# Patient Record
Sex: FEMALE | Race: WHITE | NOT HISPANIC OR LATINO | Employment: OTHER | URBAN - METROPOLITAN AREA
[De-identification: names, ages, dates, MRNs, and addresses within clinical notes are randomized per-mention and may not be internally consistent; named-entity substitution may affect disease eponyms.]

---

## 2017-12-21 ENCOUNTER — HOSPITAL ENCOUNTER (INPATIENT)
Facility: HOSPITAL | Age: 57
LOS: 5 days | Discharge: HOME | End: 2017-12-26
Attending: EMERGENCY MEDICINE | Admitting: INTERNAL MEDICINE

## 2017-12-21 ENCOUNTER — APPOINTMENT (OUTPATIENT)
Dept: CARDIOLOGY | Facility: HOSPITAL | Age: 57
End: 2017-12-21

## 2017-12-21 ENCOUNTER — APPOINTMENT (OUTPATIENT)
Dept: GENERAL RADIOLOGY | Facility: HOSPITAL | Age: 57
End: 2017-12-21

## 2017-12-21 DIAGNOSIS — J96.01 ACUTE RESPIRATORY FAILURE WITH HYPOXIA (HCC): ICD-10-CM

## 2017-12-21 DIAGNOSIS — I48.91 ATRIAL FIBRILLATION WITH RAPID VENTRICULAR RESPONSE (HCC): Primary | ICD-10-CM

## 2017-12-21 DIAGNOSIS — Z74.09 IMPAIRED FUNCTIONAL MOBILITY, BALANCE, GAIT, AND ENDURANCE: ICD-10-CM

## 2017-12-21 DIAGNOSIS — J42 CHRONIC BRONCHITIS, UNSPECIFIED CHRONIC BRONCHITIS TYPE (HCC): ICD-10-CM

## 2017-12-21 DIAGNOSIS — I50.32 CHRONIC DIASTOLIC CONGESTIVE HEART FAILURE (HCC): ICD-10-CM

## 2017-12-21 DIAGNOSIS — I50.9 ACUTE CONGESTIVE HEART FAILURE, UNSPECIFIED CONGESTIVE HEART FAILURE TYPE: ICD-10-CM

## 2017-12-21 PROBLEM — E11.9 DIABETES MELLITUS (HCC): Status: ACTIVE | Noted: 2017-12-21

## 2017-12-21 PROBLEM — G62.9 NEUROPATHY: Status: ACTIVE | Noted: 2017-12-21

## 2017-12-21 PROBLEM — Z72.0 TOBACCO ABUSE: Status: ACTIVE | Noted: 2017-12-21

## 2017-12-21 PROBLEM — I47.1 SUSTAINED SVT (HCC): Status: ACTIVE | Noted: 2017-12-21

## 2017-12-21 PROBLEM — J44.9 COPD (CHRONIC OBSTRUCTIVE PULMONARY DISEASE) (HCC): Status: ACTIVE | Noted: 2017-12-21

## 2017-12-21 PROBLEM — IMO0001 CLASS 3 OBESITY IN ADULT: Status: ACTIVE | Noted: 2017-12-21

## 2017-12-21 LAB
ALBUMIN SERPL-MCNC: 3.8 G/DL (ref 3.2–4.8)
ALBUMIN/GLOB SERPL: 1 G/DL (ref 1.5–2.5)
ALP SERPL-CCNC: 131 U/L (ref 25–100)
ALT SERPL W P-5'-P-CCNC: 21 U/L (ref 7–40)
ANION GAP SERPL CALCULATED.3IONS-SCNC: 18 MMOL/L (ref 3–11)
AST SERPL-CCNC: 26 U/L (ref 0–33)
BASOPHILS # BLD AUTO: 0.03 10*3/MM3 (ref 0–0.2)
BASOPHILS NFR BLD AUTO: 0.2 % (ref 0–1)
BH CV ECHO MEAS - AO MAX PG (FULL): 0.8 MMHG
BH CV ECHO MEAS - AO MAX PG: 4 MMHG
BH CV ECHO MEAS - AO ROOT AREA (BSA CORRECTED): 1.3
BH CV ECHO MEAS - AO ROOT AREA: 6.2 CM^2
BH CV ECHO MEAS - AO ROOT DIAM: 2.8 CM
BH CV ECHO MEAS - AO V2 MAX: 101 CM/SEC
BH CV ECHO MEAS - AVA(V,A): 2.8 CM^2
BH CV ECHO MEAS - AVA(V,D): 2.8 CM^2
BH CV ECHO MEAS - BSA(HAYCOCK): 2.3 M^2
BH CV ECHO MEAS - BSA: 2.1 M^2
BH CV ECHO MEAS - BZI_BMI: 44.3 KILOGRAMS/M^2
BH CV ECHO MEAS - BZI_METRIC_HEIGHT: 160 CM
BH CV ECHO MEAS - BZI_METRIC_WEIGHT: 113.4 KG
BH CV ECHO MEAS - EDV(CUBED): 94.8 ML
BH CV ECHO MEAS - EDV(TEICH): 95.4 ML
BH CV ECHO MEAS - EF(CUBED): 34.5 %
BH CV ECHO MEAS - EF(TEICH): 28.3 %
BH CV ECHO MEAS - ESV(CUBED): 62.1 ML
BH CV ECHO MEAS - ESV(TEICH): 68.3 ML
BH CV ECHO MEAS - FS: 13.2 %
BH CV ECHO MEAS - IVS/LVPW: 0.9
BH CV ECHO MEAS - IVSD: 0.96 CM
BH CV ECHO MEAS - LA DIMENSION: 4 CM
BH CV ECHO MEAS - LA/AO: 1.4
BH CV ECHO MEAS - LV MASS(C)D: 160.1 GRAMS
BH CV ECHO MEAS - LV MASS(C)DI: 75.3 GRAMS/M^2
BH CV ECHO MEAS - LV MAX PG: 3.2 MMHG
BH CV ECHO MEAS - LV MEAN PG: 1 MMHG
BH CV ECHO MEAS - LV V1 MAX: 89.5 CM/SEC
BH CV ECHO MEAS - LV V1 MEAN: 56 CM/SEC
BH CV ECHO MEAS - LV V1 VTI: 13.8 CM
BH CV ECHO MEAS - LVIDD: 4.6 CM
BH CV ECHO MEAS - LVIDS: 4 CM
BH CV ECHO MEAS - LVOT AREA (M): 3.1 CM^2
BH CV ECHO MEAS - LVOT AREA: 3.1 CM^2
BH CV ECHO MEAS - LVOT DIAM: 2 CM
BH CV ECHO MEAS - LVPWD: 1.1 CM
BH CV ECHO MEAS - MV DEC TIME: 0.18 SEC
BH CV ECHO MEAS - MV E MAX VEL: 110 CM/SEC
BH CV ECHO MEAS - RAP SYSTOLE: 8 MMHG
BH CV ECHO MEAS - RVDD: 3.5 CM
BH CV ECHO MEAS - RVSP: 31 MMHG
BH CV ECHO MEAS - SI(CUBED): 15.4 ML/M^2
BH CV ECHO MEAS - SI(LVOT): 20.4 ML/M^2
BH CV ECHO MEAS - SI(TEICH): 12.7 ML/M^2
BH CV ECHO MEAS - SV(CUBED): 32.7 ML
BH CV ECHO MEAS - SV(LVOT): 43.4 ML
BH CV ECHO MEAS - SV(TEICH): 27 ML
BH CV ECHO MEAS - TR MAX V: 23 MMHG
BH CV ECHO MEAS - TR MAX VEL: 236 CM/SEC
BH CV XLRA - RV BASE: 4.2 CM
BH CV XLRA - RV LENGTH: 8.5 CM
BH CV XLRA - RV MID: 3.8 CM
BILIRUB SERPL-MCNC: 0.5 MG/DL (ref 0.3–1.2)
BNP SERPL-MCNC: 342 PG/ML (ref 0–100)
BUN BLD-MCNC: 21 MG/DL (ref 9–23)
BUN/CREAT SERPL: 15 (ref 7–25)
CALCIUM SPEC-SCNC: 9.2 MG/DL (ref 8.7–10.4)
CHLORIDE SERPL-SCNC: 90 MMOL/L (ref 99–109)
CO2 SERPL-SCNC: 23 MMOL/L (ref 20–31)
CREAT BLD-MCNC: 1.4 MG/DL (ref 0.6–1.3)
DEPRECATED RDW RBC AUTO: 49.8 FL (ref 37–54)
EOSINOPHIL # BLD AUTO: 0.04 10*3/MM3 (ref 0–0.3)
EOSINOPHIL NFR BLD AUTO: 0.3 % (ref 0–3)
ERYTHROCYTE [DISTWIDTH] IN BLOOD BY AUTOMATED COUNT: 14.3 % (ref 11.3–14.5)
GFR SERPL CREATININE-BSD FRML MDRD: 39 ML/MIN/1.73
GLOBULIN UR ELPH-MCNC: 3.8 GM/DL
GLUCOSE BLD-MCNC: 173 MG/DL (ref 70–100)
GLUCOSE BLDC GLUCOMTR-MCNC: 106 MG/DL (ref 70–130)
GLUCOSE BLDC GLUCOMTR-MCNC: 133 MG/DL (ref 70–130)
HCT VFR BLD AUTO: 55.9 % (ref 34.5–44)
HGB BLD-MCNC: 18.2 G/DL (ref 11.5–15.5)
HOLD SPECIMEN: NORMAL
HOLD SPECIMEN: NORMAL
IMM GRANULOCYTES # BLD: 0.07 10*3/MM3 (ref 0–0.03)
IMM GRANULOCYTES NFR BLD: 0.5 % (ref 0–0.6)
LYMPHOCYTES # BLD AUTO: 1.9 10*3/MM3 (ref 0.6–4.8)
LYMPHOCYTES NFR BLD AUTO: 12.3 % (ref 24–44)
MAGNESIUM SERPL-MCNC: 1.8 MG/DL (ref 1.3–2.7)
MAXIMAL PREDICTED HEART RATE: 164 BPM
MCH RBC QN AUTO: 30.6 PG (ref 27–31)
MCHC RBC AUTO-ENTMCNC: 32.6 G/DL (ref 32–36)
MCV RBC AUTO: 94.1 FL (ref 80–99)
MONOCYTES # BLD AUTO: 0.92 10*3/MM3 (ref 0–1)
MONOCYTES NFR BLD AUTO: 5.9 % (ref 0–12)
NEUTROPHILS # BLD AUTO: 12.53 10*3/MM3 (ref 1.5–8.3)
NEUTROPHILS NFR BLD AUTO: 80.8 % (ref 41–71)
PLATELET # BLD AUTO: 301 10*3/MM3 (ref 150–450)
PMV BLD AUTO: 10.6 FL (ref 6–12)
POTASSIUM BLD-SCNC: 4.5 MMOL/L (ref 3.5–5.5)
PROT SERPL-MCNC: 7.6 G/DL (ref 5.7–8.2)
RBC # BLD AUTO: 5.94 10*6/MM3 (ref 3.89–5.14)
SODIUM BLD-SCNC: 131 MMOL/L (ref 132–146)
STRESS TARGET HR: 139 BPM
TROPONIN I SERPL-MCNC: 0 NG/ML (ref 0–0.07)
TROPONIN I SERPL-MCNC: 0.01 NG/ML
TSH SERPL DL<=0.05 MIU/L-ACNC: 3.65 MIU/ML (ref 0.35–5.35)
WBC NRBC COR # BLD: 15.49 10*3/MM3 (ref 3.5–10.8)
WHOLE BLOOD HOLD SPECIMEN: NORMAL
WHOLE BLOOD HOLD SPECIMEN: NORMAL

## 2017-12-21 PROCEDURE — 84443 ASSAY THYROID STIM HORMONE: CPT | Performed by: EMERGENCY MEDICINE

## 2017-12-21 PROCEDURE — 25010000002 ADENOSINE PER 6 MG

## 2017-12-21 PROCEDURE — 82962 GLUCOSE BLOOD TEST: CPT

## 2017-12-21 PROCEDURE — 80053 COMPREHEN METABOLIC PANEL: CPT | Performed by: EMERGENCY MEDICINE

## 2017-12-21 PROCEDURE — 99285 EMERGENCY DEPT VISIT HI MDM: CPT

## 2017-12-21 PROCEDURE — 83880 ASSAY OF NATRIURETIC PEPTIDE: CPT | Performed by: EMERGENCY MEDICINE

## 2017-12-21 PROCEDURE — 84484 ASSAY OF TROPONIN QUANT: CPT | Performed by: PHYSICIAN ASSISTANT

## 2017-12-21 PROCEDURE — 93306 TTE W/DOPPLER COMPLETE: CPT

## 2017-12-21 PROCEDURE — 25010000002 FUROSEMIDE PER 20 MG: Performed by: EMERGENCY MEDICINE

## 2017-12-21 PROCEDURE — G0378 HOSPITAL OBSERVATION PER HR: HCPCS

## 2017-12-21 PROCEDURE — 71010 HC CHEST PA OR AP: CPT

## 2017-12-21 PROCEDURE — 93306 TTE W/DOPPLER COMPLETE: CPT | Performed by: INTERNAL MEDICINE

## 2017-12-21 PROCEDURE — 83735 ASSAY OF MAGNESIUM: CPT | Performed by: EMERGENCY MEDICINE

## 2017-12-21 PROCEDURE — 93010 ELECTROCARDIOGRAM REPORT: CPT | Performed by: INTERNAL MEDICINE

## 2017-12-21 PROCEDURE — 63710000001 INSULIN LISPRO (HUMAN) PER 5 UNITS: Performed by: INTERNAL MEDICINE

## 2017-12-21 PROCEDURE — 99219 PR INITIAL OBSERVATION CARE/DAY 50 MINUTES: CPT | Performed by: INTERNAL MEDICINE

## 2017-12-21 PROCEDURE — 93005 ELECTROCARDIOGRAM TRACING: CPT | Performed by: EMERGENCY MEDICINE

## 2017-12-21 PROCEDURE — 25010000002 ADENOSINE PER 6 MG: Performed by: EMERGENCY MEDICINE

## 2017-12-21 PROCEDURE — 25010000002 SULFUR HEXAFLUORIDE MICROSPH 60.7-25 MG RECONSTITUTED SUSPENSION: Performed by: EMERGENCY MEDICINE

## 2017-12-21 PROCEDURE — 85025 COMPLETE CBC W/AUTO DIFF WBC: CPT | Performed by: EMERGENCY MEDICINE

## 2017-12-21 PROCEDURE — 84484 ASSAY OF TROPONIN QUANT: CPT

## 2017-12-21 PROCEDURE — 93005 ELECTROCARDIOGRAM TRACING: CPT | Performed by: PHYSICIAN ASSISTANT

## 2017-12-21 RX ORDER — METFORMIN HYDROCHLORIDE 500 MG/1
500 TABLET, EXTENDED RELEASE ORAL
Status: DISCONTINUED | OUTPATIENT
Start: 2017-12-22 | End: 2017-12-26 | Stop reason: HOSPADM

## 2017-12-21 RX ORDER — SENNOSIDES 8.6 MG
650 CAPSULE ORAL EVERY 8 HOURS PRN
COMMUNITY
End: 2018-03-14

## 2017-12-21 RX ORDER — FUROSEMIDE 10 MG/ML
40 INJECTION INTRAMUSCULAR; INTRAVENOUS ONCE
Status: COMPLETED | OUTPATIENT
Start: 2017-12-21 | End: 2017-12-21

## 2017-12-21 RX ORDER — CITALOPRAM 40 MG/1
20 TABLET ORAL DAILY
COMMUNITY
End: 2018-03-14

## 2017-12-21 RX ORDER — BISOPROLOL FUMARATE 10 MG/1
10 TABLET, FILM COATED ORAL DAILY
COMMUNITY
End: 2017-12-26 | Stop reason: HOSPADM

## 2017-12-21 RX ORDER — ACETAMINOPHEN 325 MG/1
650 TABLET ORAL EVERY 8 HOURS PRN
Status: DISCONTINUED | OUTPATIENT
Start: 2017-12-21 | End: 2017-12-26 | Stop reason: HOSPADM

## 2017-12-21 RX ORDER — FUROSEMIDE 40 MG/1
80 TABLET ORAL ONCE
Status: COMPLETED | OUTPATIENT
Start: 2017-12-21 | End: 2017-12-21

## 2017-12-21 RX ORDER — HYDROXYZINE 50 MG/1
50 TABLET, FILM COATED ORAL EVERY 6 HOURS PRN
COMMUNITY
End: 2017-12-21

## 2017-12-21 RX ORDER — ASPIRIN 81 MG/1
81 TABLET ORAL DAILY
Status: DISCONTINUED | OUTPATIENT
Start: 2017-12-21 | End: 2017-12-26 | Stop reason: HOSPADM

## 2017-12-21 RX ORDER — METFORMIN HYDROCHLORIDE 500 MG/1
500 TABLET, EXTENDED RELEASE ORAL EVERY EVENING
Status: DISCONTINUED | OUTPATIENT
Start: 2017-12-21 | End: 2017-12-21

## 2017-12-21 RX ORDER — CITALOPRAM 40 MG/1
40 TABLET ORAL DAILY
Status: DISCONTINUED | OUTPATIENT
Start: 2017-12-21 | End: 2017-12-26 | Stop reason: HOSPADM

## 2017-12-21 RX ORDER — NICOTINE POLACRILEX 4 MG
15 LOZENGE BUCCAL
Status: DISCONTINUED | OUTPATIENT
Start: 2017-12-21 | End: 2017-12-26 | Stop reason: HOSPADM

## 2017-12-21 RX ORDER — ASPIRIN 81 MG/1
81 TABLET ORAL DAILY
COMMUNITY
End: 2017-12-21

## 2017-12-21 RX ORDER — SODIUM CHLORIDE 0.9 % (FLUSH) 0.9 %
10 SYRINGE (ML) INJECTION AS NEEDED
Status: DISCONTINUED | OUTPATIENT
Start: 2017-12-21 | End: 2017-12-26 | Stop reason: HOSPADM

## 2017-12-21 RX ORDER — ADENOSINE 3 MG/ML
6 INJECTION, SOLUTION INTRAVENOUS ONCE
Status: COMPLETED | OUTPATIENT
Start: 2017-12-21 | End: 2017-12-21

## 2017-12-21 RX ORDER — METFORMIN HYDROCHLORIDE 500 MG/1
500 TABLET, EXTENDED RELEASE ORAL EVERY EVENING
Status: ON HOLD | COMMUNITY
End: 2017-12-26

## 2017-12-21 RX ORDER — POTASSIUM CHLORIDE 1.5 G/1.77G
20 POWDER, FOR SOLUTION ORAL DAILY
Status: DISCONTINUED | OUTPATIENT
Start: 2017-12-21 | End: 2017-12-26

## 2017-12-21 RX ORDER — CETIRIZINE HYDROCHLORIDE 10 MG/1
10 TABLET ORAL AS NEEDED
COMMUNITY

## 2017-12-21 RX ORDER — ALBUTEROL SULFATE 90 UG/1
2 AEROSOL, METERED RESPIRATORY (INHALATION) EVERY 4 HOURS PRN
COMMUNITY
End: 2017-12-26 | Stop reason: HOSPADM

## 2017-12-21 RX ORDER — CLOPIDOGREL BISULFATE 75 MG/1
75 TABLET ORAL DAILY
COMMUNITY
End: 2017-12-26 | Stop reason: HOSPADM

## 2017-12-21 RX ORDER — DILTIAZEM HCL IN NACL,ISO-OSM 125 MG/125
5-15 PLASTIC BAG, INJECTION (ML) INTRAVENOUS CONTINUOUS
Status: DISCONTINUED | OUTPATIENT
Start: 2017-12-21 | End: 2017-12-22

## 2017-12-21 RX ORDER — DEXTROSE MONOHYDRATE 25 G/50ML
25 INJECTION, SOLUTION INTRAVENOUS
Status: DISCONTINUED | OUTPATIENT
Start: 2017-12-21 | End: 2017-12-26 | Stop reason: HOSPADM

## 2017-12-21 RX ORDER — PANTOPRAZOLE SODIUM 40 MG/1
40 TABLET, DELAYED RELEASE ORAL EVERY MORNING
Status: DISCONTINUED | OUTPATIENT
Start: 2017-12-22 | End: 2017-12-26 | Stop reason: HOSPADM

## 2017-12-21 RX ORDER — FUROSEMIDE 80 MG
80 TABLET ORAL 2 TIMES DAILY
COMMUNITY
End: 2017-12-26 | Stop reason: HOSPADM

## 2017-12-21 RX ORDER — CETIRIZINE HYDROCHLORIDE 10 MG/1
10 TABLET ORAL DAILY
Status: DISCONTINUED | OUTPATIENT
Start: 2017-12-21 | End: 2017-12-26 | Stop reason: HOSPADM

## 2017-12-21 RX ORDER — BISOPROLOL FUMARATE 5 MG/1
10 TABLET, FILM COATED ORAL
Status: DISCONTINUED | OUTPATIENT
Start: 2017-12-21 | End: 2017-12-22

## 2017-12-21 RX ORDER — HEPARIN SODIUM 5000 [USP'U]/ML
5000 INJECTION, SOLUTION INTRAVENOUS; SUBCUTANEOUS EVERY 12 HOURS SCHEDULED
Status: DISCONTINUED | OUTPATIENT
Start: 2017-12-21 | End: 2017-12-21

## 2017-12-21 RX ORDER — POTASSIUM CHLORIDE 750 MG/1
20 TABLET, EXTENDED RELEASE ORAL DAILY
COMMUNITY
End: 2018-03-14

## 2017-12-21 RX ORDER — ASPIRIN 325 MG
325 TABLET ORAL DAILY
COMMUNITY
End: 2017-12-26 | Stop reason: HOSPADM

## 2017-12-21 RX ORDER — CLOPIDOGREL BISULFATE 75 MG/1
75 TABLET ORAL ONCE
Status: COMPLETED | OUTPATIENT
Start: 2017-12-21 | End: 2017-12-21

## 2017-12-21 RX ORDER — ADENOSINE 3 MG/ML
INJECTION, SOLUTION INTRAVENOUS
Status: COMPLETED
Start: 2017-12-21 | End: 2017-12-21

## 2017-12-21 RX ORDER — OMEPRAZOLE 40 MG/1
40 CAPSULE, DELAYED RELEASE ORAL DAILY
COMMUNITY

## 2017-12-21 RX ORDER — FUROSEMIDE 40 MG/1
80 TABLET ORAL
Status: DISCONTINUED | OUTPATIENT
Start: 2017-12-21 | End: 2017-12-25

## 2017-12-21 RX ORDER — HYDROXYZINE HYDROCHLORIDE 25 MG/1
50 TABLET, FILM COATED ORAL 3 TIMES DAILY PRN
Status: DISCONTINUED | OUTPATIENT
Start: 2017-12-21 | End: 2017-12-26 | Stop reason: HOSPADM

## 2017-12-21 RX ORDER — CLOPIDOGREL BISULFATE 75 MG/1
75 TABLET ORAL DAILY
Status: DISCONTINUED | OUTPATIENT
Start: 2017-12-22 | End: 2017-12-26 | Stop reason: HOSPADM

## 2017-12-21 RX ORDER — SODIUM CHLORIDE 0.9 % (FLUSH) 0.9 %
1-10 SYRINGE (ML) INJECTION AS NEEDED
Status: DISCONTINUED | OUTPATIENT
Start: 2017-12-21 | End: 2017-12-26 | Stop reason: HOSPADM

## 2017-12-21 RX ORDER — HYDROXYZINE PAMOATE 50 MG/1
50 CAPSULE ORAL EVERY MORNING
COMMUNITY

## 2017-12-21 RX ORDER — ADENOSINE 3 MG/ML
12 INJECTION, SOLUTION INTRAVENOUS ONCE
Status: COMPLETED | OUTPATIENT
Start: 2017-12-21 | End: 2017-12-21

## 2017-12-21 RX ADMIN — FUROSEMIDE 40 MG: 10 INJECTION, SOLUTION INTRAMUSCULAR; INTRAVENOUS at 09:08

## 2017-12-21 RX ADMIN — BISOPROLOL FUMARATE 10 MG: 5 TABLET ORAL at 12:27

## 2017-12-21 RX ADMIN — SULFUR HEXAFLUORIDE 2 ML: KIT at 14:15

## 2017-12-21 RX ADMIN — CLOPIDOGREL BISULFATE 75 MG: 75 TABLET ORAL at 12:27

## 2017-12-21 RX ADMIN — ADENOSINE 6 MG: 3 INJECTION, SOLUTION INTRAVENOUS at 06:35

## 2017-12-21 RX ADMIN — DILTIAZEM HCL-SODIUM CHLORIDE IV SOLN 125 MG/125ML-0.9% 5 MG/HR: 125-0.9/125 SOLUTION at 07:22

## 2017-12-21 RX ADMIN — ADENOSINE 6 MG: 3 INJECTION INTRAVENOUS at 06:35

## 2017-12-21 RX ADMIN — RIVAROXABAN 20 MG: 20 TABLET, FILM COATED ORAL at 17:32

## 2017-12-21 RX ADMIN — ADENOSINE 12 MG: 3 INJECTION INTRAVENOUS at 06:45

## 2017-12-21 RX ADMIN — FUROSEMIDE 80 MG: 40 TABLET ORAL at 17:32

## 2017-12-21 RX ADMIN — ASPIRIN 81 MG: 81 TABLET, COATED ORAL at 17:32

## 2017-12-21 RX ADMIN — CITALOPRAM HYDROBROMIDE 40 MG: 40 TABLET ORAL at 17:32

## 2017-12-21 RX ADMIN — SODIUM CHLORIDE 1000 ML: 9 INJECTION, SOLUTION INTRAVENOUS at 07:21

## 2017-12-21 RX ADMIN — POTASSIUM CHLORIDE 20 MEQ: 1.5 POWDER, FOR SOLUTION ORAL at 17:32

## 2017-12-21 RX ADMIN — CETIRIZINE HYDROCHLORIDE 10 MG: 10 TABLET, FILM COATED ORAL at 17:32

## 2017-12-21 RX ADMIN — FUROSEMIDE 80 MG: 40 TABLET ORAL at 12:27

## 2017-12-22 ENCOUNTER — ANESTHESIA (OUTPATIENT)
Dept: CARDIOLOGY | Facility: HOSPITAL | Age: 57
End: 2017-12-22

## 2017-12-22 ENCOUNTER — ANESTHESIA EVENT (OUTPATIENT)
Dept: CARDIOLOGY | Facility: HOSPITAL | Age: 57
End: 2017-12-22

## 2017-12-22 ENCOUNTER — APPOINTMENT (OUTPATIENT)
Dept: CARDIOLOGY | Facility: HOSPITAL | Age: 57
End: 2017-12-22
Attending: INTERNAL MEDICINE

## 2017-12-22 LAB
GLUCOSE BLDC GLUCOMTR-MCNC: 124 MG/DL (ref 70–130)
GLUCOSE BLDC GLUCOMTR-MCNC: 194 MG/DL (ref 70–130)
GLUCOSE BLDC GLUCOMTR-MCNC: 96 MG/DL (ref 70–130)
HBA1C MFR BLD: 7.5 % (ref 4.8–5.6)
TROPONIN I SERPL-MCNC: <0.006 NG/ML

## 2017-12-22 PROCEDURE — 93325 DOPPLER ECHO COLOR FLOW MAPG: CPT | Performed by: INTERNAL MEDICINE

## 2017-12-22 PROCEDURE — 5A2204Z RESTORATION OF CARDIAC RHYTHM, SINGLE: ICD-10-PCS | Performed by: INTERNAL MEDICINE

## 2017-12-22 PROCEDURE — 84484 ASSAY OF TROPONIN QUANT: CPT | Performed by: INTERNAL MEDICINE

## 2017-12-22 PROCEDURE — B24BZZ4 ULTRASONOGRAPHY OF HEART WITH AORTA, TRANSESOPHAGEAL: ICD-10-PCS | Performed by: INTERNAL MEDICINE

## 2017-12-22 PROCEDURE — 92960 CARDIOVERSION ELECTRIC EXT: CPT | Performed by: INTERNAL MEDICINE

## 2017-12-22 PROCEDURE — 93005 ELECTROCARDIOGRAM TRACING: CPT | Performed by: INTERNAL MEDICINE

## 2017-12-22 PROCEDURE — G0378 HOSPITAL OBSERVATION PER HR: HCPCS

## 2017-12-22 PROCEDURE — 94799 UNLISTED PULMONARY SVC/PX: CPT

## 2017-12-22 PROCEDURE — 93005 ELECTROCARDIOGRAM TRACING: CPT | Performed by: ANESTHESIOLOGY

## 2017-12-22 PROCEDURE — 93321 DOPPLER ECHO F-UP/LMTD STD: CPT

## 2017-12-22 PROCEDURE — 25010000002 PROPOFOL 1000 MG/ML EMULSION: Performed by: NURSE ANESTHETIST, CERTIFIED REGISTERED

## 2017-12-22 PROCEDURE — 82962 GLUCOSE BLOOD TEST: CPT

## 2017-12-22 PROCEDURE — 94002 VENT MGMT INPAT INIT DAY: CPT

## 2017-12-22 PROCEDURE — 93312 ECHO TRANSESOPHAGEAL: CPT | Performed by: INTERNAL MEDICINE

## 2017-12-22 PROCEDURE — 25010000002 SUCCINYLCHOLINE PER 20 MG: Performed by: NURSE ANESTHETIST, CERTIFIED REGISTERED

## 2017-12-22 PROCEDURE — 93312 ECHO TRANSESOPHAGEAL: CPT

## 2017-12-22 PROCEDURE — 93321 DOPPLER ECHO F-UP/LMTD STD: CPT | Performed by: INTERNAL MEDICINE

## 2017-12-22 PROCEDURE — 83036 HEMOGLOBIN GLYCOSYLATED A1C: CPT | Performed by: PHYSICIAN ASSISTANT

## 2017-12-22 PROCEDURE — 93325 DOPPLER ECHO COLOR FLOW MAPG: CPT

## 2017-12-22 PROCEDURE — 92960 CARDIOVERSION ELECTRIC EXT: CPT

## 2017-12-22 RX ORDER — SOTALOL HYDROCHLORIDE 80 MG/1
80 TABLET ORAL ONCE
Status: COMPLETED | OUTPATIENT
Start: 2017-12-22 | End: 2017-12-22

## 2017-12-22 RX ORDER — LIDOCAINE HYDROCHLORIDE 10 MG/ML
INJECTION, SOLUTION EPIDURAL; INFILTRATION; INTRACAUDAL; PERINEURAL AS NEEDED
Status: DISCONTINUED | OUTPATIENT
Start: 2017-12-22 | End: 2017-12-22 | Stop reason: SURG

## 2017-12-22 RX ORDER — ROCURONIUM BROMIDE 10 MG/ML
INJECTION, SOLUTION INTRAVENOUS AS NEEDED
Status: DISCONTINUED | OUTPATIENT
Start: 2017-12-22 | End: 2017-12-22 | Stop reason: SURG

## 2017-12-22 RX ORDER — LABETALOL HYDROCHLORIDE 5 MG/ML
5 INJECTION, SOLUTION INTRAVENOUS
Status: DISCONTINUED | OUTPATIENT
Start: 2017-12-22 | End: 2017-12-22

## 2017-12-22 RX ORDER — FAMOTIDINE 10 MG/ML
20 INJECTION, SOLUTION INTRAVENOUS ONCE
Status: DISCONTINUED | OUTPATIENT
Start: 2017-12-22 | End: 2017-12-22

## 2017-12-22 RX ORDER — FUROSEMIDE 40 MG/1
80 TABLET ORAL ONCE
Status: COMPLETED | OUTPATIENT
Start: 2017-12-22 | End: 2017-12-22

## 2017-12-22 RX ORDER — FAMOTIDINE 20 MG/1
20 TABLET, FILM COATED ORAL ONCE
Status: DISCONTINUED | OUTPATIENT
Start: 2017-12-22 | End: 2017-12-22

## 2017-12-22 RX ORDER — MEPERIDINE HYDROCHLORIDE 25 MG/ML
12.5 INJECTION INTRAMUSCULAR; INTRAVENOUS; SUBCUTANEOUS
Status: DISCONTINUED | OUTPATIENT
Start: 2017-12-22 | End: 2017-12-22

## 2017-12-22 RX ORDER — SODIUM CHLORIDE, SODIUM LACTATE, POTASSIUM CHLORIDE, CALCIUM CHLORIDE 600; 310; 30; 20 MG/100ML; MG/100ML; MG/100ML; MG/100ML
9 INJECTION, SOLUTION INTRAVENOUS CONTINUOUS
Status: DISCONTINUED | OUTPATIENT
Start: 2017-12-22 | End: 2017-12-22

## 2017-12-22 RX ORDER — ONDANSETRON 2 MG/ML
4 INJECTION INTRAMUSCULAR; INTRAVENOUS ONCE AS NEEDED
Status: DISCONTINUED | OUTPATIENT
Start: 2017-12-22 | End: 2017-12-22

## 2017-12-22 RX ORDER — SODIUM CHLORIDE 0.9 % (FLUSH) 0.9 %
1-10 SYRINGE (ML) INJECTION AS NEEDED
Status: DISCONTINUED | OUTPATIENT
Start: 2017-12-22 | End: 2017-12-22

## 2017-12-22 RX ORDER — ETOMIDATE 2 MG/ML
INJECTION INTRAVENOUS AS NEEDED
Status: DISCONTINUED | OUTPATIENT
Start: 2017-12-22 | End: 2017-12-22 | Stop reason: SURG

## 2017-12-22 RX ORDER — EPHEDRINE SULFATE 50 MG/ML
5 INJECTION, SOLUTION INTRAVENOUS ONCE AS NEEDED
Status: DISCONTINUED | OUTPATIENT
Start: 2017-12-22 | End: 2017-12-22

## 2017-12-22 RX ORDER — SOTALOL HYDROCHLORIDE 80 MG/1
80 TABLET ORAL EVERY 12 HOURS SCHEDULED
Status: DISCONTINUED | OUTPATIENT
Start: 2017-12-22 | End: 2017-12-23

## 2017-12-22 RX ORDER — SODIUM CHLORIDE 9 MG/ML
INJECTION, SOLUTION INTRAVENOUS CONTINUOUS PRN
Status: DISCONTINUED | OUTPATIENT
Start: 2017-12-22 | End: 2017-12-22 | Stop reason: SURG

## 2017-12-22 RX ORDER — LIDOCAINE HYDROCHLORIDE 10 MG/ML
0.5 INJECTION, SOLUTION EPIDURAL; INFILTRATION; INTRACAUDAL; PERINEURAL ONCE AS NEEDED
Status: DISCONTINUED | OUTPATIENT
Start: 2017-12-22 | End: 2017-12-22

## 2017-12-22 RX ORDER — SUCCINYLCHOLINE CHLORIDE 20 MG/ML
INJECTION INTRAMUSCULAR; INTRAVENOUS AS NEEDED
Status: DISCONTINUED | OUTPATIENT
Start: 2017-12-22 | End: 2017-12-22 | Stop reason: SURG

## 2017-12-22 RX ORDER — SODIUM CHLORIDE 9 MG/ML
50 INJECTION, SOLUTION INTRAVENOUS CONTINUOUS
Status: DISCONTINUED | OUTPATIENT
Start: 2017-12-23 | End: 2017-12-26 | Stop reason: HOSPADM

## 2017-12-22 RX ORDER — SODIUM CHLORIDE, SODIUM LACTATE, POTASSIUM CHLORIDE, CALCIUM CHLORIDE 600; 310; 30; 20 MG/100ML; MG/100ML; MG/100ML; MG/100ML
100 INJECTION, SOLUTION INTRAVENOUS CONTINUOUS
Status: DISCONTINUED | OUTPATIENT
Start: 2017-12-22 | End: 2017-12-22

## 2017-12-22 RX ORDER — NALOXONE HCL 0.4 MG/ML
0.4 VIAL (ML) INJECTION AS NEEDED
Status: DISCONTINUED | OUTPATIENT
Start: 2017-12-22 | End: 2017-12-22

## 2017-12-22 RX ADMIN — SODIUM CHLORIDE: 9 INJECTION, SOLUTION INTRAVENOUS at 15:16

## 2017-12-22 RX ADMIN — RIVAROXABAN 20 MG: 20 TABLET, FILM COATED ORAL at 18:32

## 2017-12-22 RX ADMIN — SODIUM CHLORIDE: 9 INJECTION, SOLUTION INTRAVENOUS at 15:17

## 2017-12-22 RX ADMIN — FUROSEMIDE 80 MG: 40 TABLET ORAL at 18:33

## 2017-12-22 RX ADMIN — LIDOCAINE HYDROCHLORIDE 30 MG: 10 INJECTION, SOLUTION EPIDURAL; INFILTRATION; INTRACAUDAL; PERINEURAL at 15:17

## 2017-12-22 RX ADMIN — ACETAMINOPHEN 650 MG: 325 TABLET ORAL at 20:02

## 2017-12-22 RX ADMIN — SUCCINYLCHOLINE CHLORIDE 180 MG: 20 INJECTION, SOLUTION INTRAMUSCULAR; INTRAVENOUS at 15:17

## 2017-12-22 RX ADMIN — SOTALOL HYDROCHLORIDE 80 MG: 80 TABLET ORAL at 18:32

## 2017-12-22 RX ADMIN — PROPOFOL 125 MCG/KG/MIN: 10 INJECTION, EMULSION INTRAVENOUS at 15:19

## 2017-12-22 RX ADMIN — INSULIN LISPRO 2 UNITS: 100 INJECTION, SOLUTION INTRAVENOUS; SUBCUTANEOUS at 22:00

## 2017-12-22 RX ADMIN — ROCURONIUM BROMIDE 5 MG: 10 INJECTION, SOLUTION INTRAVENOUS at 15:17

## 2017-12-22 RX ADMIN — ETOMIDATE 22.68 MG: 2 INJECTION INTRAVENOUS at 15:17

## 2017-12-22 NOTE — ANESTHESIA PREPROCEDURE EVALUATION
Anesthesia Evaluation     Patient summary reviewed and Nursing notes reviewed   NPO Solid Status: > 8 hours  NPO Liquid Status: > 8 hours     Airway   Mallampati: I  TM distance: >3 FB  Neck ROM: full  no difficulty expected  Dental    (+) edentulous    Pulmonary    (+) decreased breath sounds,   Cardiovascular     Rhythm: regular  Rate: abnormal        Neuro/Psych  GI/Hepatic/Renal/Endo      Musculoskeletal     Abdominal    Substance History      OB/GYN          Other                                        Anesthesia Plan    ASA 3     MAC     intravenous induction   Anesthetic plan and risks discussed with patient.

## 2017-12-22 NOTE — ANESTHESIA PROCEDURE NOTES
Airway  Urgency: elective    Airway not difficult    General Information and Staff    Patient location during procedure: OR  Anesthesiologist: CHARLINE HERNANDEZ  CRNA: ELISEO BATISTA    Indications and Patient Condition  Indications for airway management: airway protection    Preoxygenated: yes  MILS not maintained throughout  Mask difficulty assessment: 1 - vent by mask    Final Airway Details  Final airway type: endotracheal airway      Successful airway: ETT  Cuffed: yes   Successful intubation technique: direct laryngoscopy  Endotracheal tube insertion site: oral  Blade: Singleton  Blade size: #2  ETT size: 7.0 mm  Cormack-Lehane Classification: grade I - full view of glottis  Placement verified by: chest auscultation and capnometry   Cuff volume (mL): 5  Measured from: lips  ETT to lips (cm): 20  Number of attempts at approach: 1    Additional Comments  Negative epigastric sounds, Breath sound equal bilaterally with symmetric chest rise and fall

## 2017-12-22 NOTE — ANESTHESIA POSTPROCEDURE EVALUATION
Patient: Maria T Garcia    Procedure Summary     Date Anesthesia Start Anesthesia Stop Room / Location    12/22/17 4117  [facility] CVOU       Procedure Diagnosis Scheduled Providers Provider    ADULT TRANSESOPHAGEAL ECHO (MARY) W/ CONT IF NECESSARY PER PROTOCOL; CARDIOVERSION EXTERNAL IN CARDIOLOGY DEPARTMENT (Cardiac Source of Emboli; Atypical atrial flutter)  Coy Oglesby MD          Anesthesia Type: MAC  Last vitals  BP   96/69 (12/22/17 1233)   Temp   97.9 °F (36.6 °C) (12/22/17 0700)   Pulse   103 (12/22/17 1233)   Resp   20 (12/22/17 0700)     SpO2   91 % (12/22/17 1233)     Post Anesthesia Care and Evaluation    Patient location during evaluation: PACU  Patient participation: complete - patient participated  Pain score: 0  Pain management: adequate  Airway patency: patent  Anesthetic complications: No anesthetic complications  PONV Status: none  Cardiovascular status: hemodynamically stable and acceptable  Respiratory status: acceptable, ETT and intubated  Hydration status: acceptable

## 2017-12-23 LAB
ANION GAP SERPL CALCULATED.3IONS-SCNC: 6 MMOL/L (ref 3–11)
BUN BLD-MCNC: 17 MG/DL (ref 9–23)
BUN/CREAT SERPL: 14.2 (ref 7–25)
CALCIUM SPEC-SCNC: 8.3 MG/DL (ref 8.7–10.4)
CHLORIDE SERPL-SCNC: 98 MMOL/L (ref 99–109)
CO2 SERPL-SCNC: 30 MMOL/L (ref 20–31)
CREAT BLD-MCNC: 1.2 MG/DL (ref 0.6–1.3)
GFR SERPL CREATININE-BSD FRML MDRD: 46 ML/MIN/1.73
GLUCOSE BLD-MCNC: 122 MG/DL (ref 70–100)
GLUCOSE BLDC GLUCOMTR-MCNC: 105 MG/DL (ref 70–130)
GLUCOSE BLDC GLUCOMTR-MCNC: 126 MG/DL (ref 70–130)
GLUCOSE BLDC GLUCOMTR-MCNC: 142 MG/DL (ref 70–130)
GLUCOSE BLDC GLUCOMTR-MCNC: 158 MG/DL (ref 70–130)
POTASSIUM BLD-SCNC: 4.1 MMOL/L (ref 3.5–5.5)
SODIUM BLD-SCNC: 134 MMOL/L (ref 132–146)

## 2017-12-23 PROCEDURE — 82962 GLUCOSE BLOOD TEST: CPT

## 2017-12-23 PROCEDURE — G0378 HOSPITAL OBSERVATION PER HR: HCPCS

## 2017-12-23 PROCEDURE — 80048 BASIC METABOLIC PNL TOTAL CA: CPT | Performed by: NURSE PRACTITIONER

## 2017-12-23 PROCEDURE — 93005 ELECTROCARDIOGRAM TRACING: CPT | Performed by: PHYSICIAN ASSISTANT

## 2017-12-23 PROCEDURE — 25010000002 FUROSEMIDE PER 20 MG: Performed by: NURSE PRACTITIONER

## 2017-12-23 PROCEDURE — 99225 PR SBSQ OBSERVATION CARE/DAY 25 MINUTES: CPT | Performed by: INTERNAL MEDICINE

## 2017-12-23 RX ORDER — BUMETANIDE 0.25 MG/ML
1 INJECTION INTRAMUSCULAR; INTRAVENOUS ONCE
Status: DISCONTINUED | OUTPATIENT
Start: 2017-12-23 | End: 2017-12-23 | Stop reason: CLARIF

## 2017-12-23 RX ORDER — FUROSEMIDE 10 MG/ML
40 INJECTION INTRAMUSCULAR; INTRAVENOUS ONCE
Status: COMPLETED | OUTPATIENT
Start: 2017-12-23 | End: 2017-12-23

## 2017-12-23 RX ORDER — SOTALOL HYDROCHLORIDE 80 MG/1
40 TABLET ORAL EVERY 12 HOURS SCHEDULED
Status: DISCONTINUED | OUTPATIENT
Start: 2017-12-23 | End: 2017-12-26 | Stop reason: HOSPADM

## 2017-12-23 RX ADMIN — CLOPIDOGREL BISULFATE 75 MG: 75 TABLET ORAL at 09:36

## 2017-12-23 RX ADMIN — PANTOPRAZOLE SODIUM 40 MG: 40 TABLET, DELAYED RELEASE ORAL at 06:46

## 2017-12-23 RX ADMIN — FUROSEMIDE 80 MG: 40 TABLET ORAL at 09:43

## 2017-12-23 RX ADMIN — ASPIRIN 81 MG: 81 TABLET, COATED ORAL at 09:35

## 2017-12-23 RX ADMIN — SODIUM CHLORIDE 50 ML/HR: 9 INJECTION, SOLUTION INTRAVENOUS at 23:27

## 2017-12-23 RX ADMIN — FUROSEMIDE 20 MG: 10 INJECTION, SOLUTION INTRAMUSCULAR; INTRAVENOUS at 09:36

## 2017-12-23 RX ADMIN — CETIRIZINE HYDROCHLORIDE 10 MG: 10 TABLET, FILM COATED ORAL at 09:35

## 2017-12-23 RX ADMIN — METFORMIN HYDROCHLORIDE 500 MG: 500 TABLET, EXTENDED RELEASE ORAL at 09:36

## 2017-12-23 RX ADMIN — POTASSIUM CHLORIDE 20 MEQ: 1.5 POWDER, FOR SOLUTION ORAL at 09:34

## 2017-12-23 RX ADMIN — CITALOPRAM HYDROBROMIDE 40 MG: 40 TABLET ORAL at 09:35

## 2017-12-24 LAB
ANION GAP SERPL CALCULATED.3IONS-SCNC: 6 MMOL/L (ref 3–11)
BUN BLD-MCNC: 15 MG/DL (ref 9–23)
BUN/CREAT SERPL: 13.6 (ref 7–25)
CALCIUM SPEC-SCNC: 8.9 MG/DL (ref 8.7–10.4)
CHLORIDE SERPL-SCNC: 97 MMOL/L (ref 99–109)
CO2 SERPL-SCNC: 33 MMOL/L (ref 20–31)
CREAT BLD-MCNC: 1.1 MG/DL (ref 0.6–1.3)
GFR SERPL CREATININE-BSD FRML MDRD: 51 ML/MIN/1.73
GLUCOSE BLD-MCNC: 103 MG/DL (ref 70–100)
GLUCOSE BLDC GLUCOMTR-MCNC: 103 MG/DL (ref 70–130)
GLUCOSE BLDC GLUCOMTR-MCNC: 113 MG/DL (ref 70–130)
GLUCOSE BLDC GLUCOMTR-MCNC: 135 MG/DL (ref 70–130)
GLUCOSE BLDC GLUCOMTR-MCNC: 142 MG/DL (ref 70–130)
POTASSIUM BLD-SCNC: 4 MMOL/L (ref 3.5–5.5)
SODIUM BLD-SCNC: 136 MMOL/L (ref 132–146)

## 2017-12-24 PROCEDURE — 93005 ELECTROCARDIOGRAM TRACING: CPT | Performed by: NURSE PRACTITIONER

## 2017-12-24 PROCEDURE — G0378 HOSPITAL OBSERVATION PER HR: HCPCS

## 2017-12-24 PROCEDURE — 93010 ELECTROCARDIOGRAM REPORT: CPT | Performed by: INTERNAL MEDICINE

## 2017-12-24 PROCEDURE — 97116 GAIT TRAINING THERAPY: CPT | Performed by: PHYSICAL THERAPIST

## 2017-12-24 PROCEDURE — 82962 GLUCOSE BLOOD TEST: CPT

## 2017-12-24 PROCEDURE — G8979 MOBILITY GOAL STATUS: HCPCS | Performed by: PHYSICAL THERAPIST

## 2017-12-24 PROCEDURE — 97162 PT EVAL MOD COMPLEX 30 MIN: CPT | Performed by: PHYSICAL THERAPIST

## 2017-12-24 PROCEDURE — G8978 MOBILITY CURRENT STATUS: HCPCS | Performed by: PHYSICAL THERAPIST

## 2017-12-24 PROCEDURE — 80048 BASIC METABOLIC PNL TOTAL CA: CPT | Performed by: NURSE PRACTITIONER

## 2017-12-24 PROCEDURE — 99225 PR SBSQ OBSERVATION CARE/DAY 25 MINUTES: CPT | Performed by: INTERNAL MEDICINE

## 2017-12-24 RX ORDER — BUMETANIDE 1 MG/1
1 TABLET ORAL ONCE
Status: COMPLETED | OUTPATIENT
Start: 2017-12-24 | End: 2017-12-24

## 2017-12-24 RX ORDER — BUMETANIDE 0.25 MG/ML
1 INJECTION INTRAMUSCULAR; INTRAVENOUS ONCE
Status: DISCONTINUED | OUTPATIENT
Start: 2017-12-24 | End: 2017-12-24 | Stop reason: SDUPTHER

## 2017-12-24 RX ADMIN — SOTALOL HYDROCHLORIDE 40 MG: 80 TABLET ORAL at 06:34

## 2017-12-24 RX ADMIN — FUROSEMIDE 80 MG: 40 TABLET ORAL at 08:55

## 2017-12-24 RX ADMIN — BUMETANIDE 1 MG: 1 TABLET ORAL at 08:54

## 2017-12-24 RX ADMIN — ASPIRIN 81 MG: 81 TABLET, COATED ORAL at 08:55

## 2017-12-24 RX ADMIN — CITALOPRAM HYDROBROMIDE 40 MG: 40 TABLET ORAL at 08:55

## 2017-12-24 RX ADMIN — CETIRIZINE HYDROCHLORIDE 10 MG: 10 TABLET, FILM COATED ORAL at 08:55

## 2017-12-24 RX ADMIN — PANTOPRAZOLE SODIUM 40 MG: 40 TABLET, DELAYED RELEASE ORAL at 06:33

## 2017-12-24 RX ADMIN — POTASSIUM CHLORIDE 20 MEQ: 1.5 POWDER, FOR SOLUTION ORAL at 08:55

## 2017-12-24 RX ADMIN — METFORMIN HYDROCHLORIDE 500 MG: 500 TABLET, EXTENDED RELEASE ORAL at 08:55

## 2017-12-24 RX ADMIN — CLOPIDOGREL BISULFATE 75 MG: 75 TABLET ORAL at 08:55

## 2017-12-24 RX ADMIN — FUROSEMIDE 80 MG: 40 TABLET ORAL at 17:51

## 2017-12-24 RX ADMIN — SOTALOL HYDROCHLORIDE 40 MG: 80 TABLET ORAL at 17:51

## 2017-12-25 LAB
ANION GAP SERPL CALCULATED.3IONS-SCNC: 6 MMOL/L (ref 3–11)
BUN BLD-MCNC: 11 MG/DL (ref 9–23)
BUN/CREAT SERPL: 12.2 (ref 7–25)
CALCIUM SPEC-SCNC: 8.7 MG/DL (ref 8.7–10.4)
CHLORIDE SERPL-SCNC: 96 MMOL/L (ref 99–109)
CO2 SERPL-SCNC: 37 MMOL/L (ref 20–31)
CREAT BLD-MCNC: 0.9 MG/DL (ref 0.6–1.3)
GFR SERPL CREATININE-BSD FRML MDRD: 65 ML/MIN/1.73
GLUCOSE BLD-MCNC: 100 MG/DL (ref 70–100)
GLUCOSE BLDC GLUCOMTR-MCNC: 104 MG/DL (ref 70–130)
GLUCOSE BLDC GLUCOMTR-MCNC: 118 MG/DL (ref 70–130)
GLUCOSE BLDC GLUCOMTR-MCNC: 127 MG/DL (ref 70–130)
GLUCOSE BLDC GLUCOMTR-MCNC: 138 MG/DL (ref 70–130)
POTASSIUM BLD-SCNC: 3.7 MMOL/L (ref 3.5–5.5)
SODIUM BLD-SCNC: 139 MMOL/L (ref 132–146)

## 2017-12-25 PROCEDURE — 80048 BASIC METABOLIC PNL TOTAL CA: CPT | Performed by: NURSE PRACTITIONER

## 2017-12-25 PROCEDURE — 93010 ELECTROCARDIOGRAM REPORT: CPT | Performed by: INTERNAL MEDICINE

## 2017-12-25 PROCEDURE — 93005 ELECTROCARDIOGRAM TRACING: CPT | Performed by: INTERNAL MEDICINE

## 2017-12-25 PROCEDURE — G0378 HOSPITAL OBSERVATION PER HR: HCPCS

## 2017-12-25 PROCEDURE — 99225 PR SBSQ OBSERVATION CARE/DAY 25 MINUTES: CPT | Performed by: INTERNAL MEDICINE

## 2017-12-25 PROCEDURE — 82962 GLUCOSE BLOOD TEST: CPT

## 2017-12-25 RX ORDER — BUMETANIDE 1 MG/1
0.5 TABLET ORAL DAILY
Status: DISCONTINUED | OUTPATIENT
Start: 2017-12-25 | End: 2017-12-26 | Stop reason: HOSPADM

## 2017-12-25 RX ADMIN — ASPIRIN 81 MG: 81 TABLET, COATED ORAL at 09:13

## 2017-12-25 RX ADMIN — SOTALOL HYDROCHLORIDE 40 MG: 80 TABLET ORAL at 17:44

## 2017-12-25 RX ADMIN — METFORMIN HYDROCHLORIDE 500 MG: 500 TABLET, EXTENDED RELEASE ORAL at 09:13

## 2017-12-25 RX ADMIN — SOTALOL HYDROCHLORIDE 40 MG: 80 TABLET ORAL at 05:15

## 2017-12-25 RX ADMIN — CLOPIDOGREL BISULFATE 75 MG: 75 TABLET ORAL at 09:13

## 2017-12-25 RX ADMIN — RIVAROXABAN 20 MG: 20 TABLET, FILM COATED ORAL at 17:44

## 2017-12-25 RX ADMIN — CITALOPRAM HYDROBROMIDE 40 MG: 40 TABLET ORAL at 09:13

## 2017-12-25 RX ADMIN — ACETAMINOPHEN 650 MG: 325 TABLET ORAL at 05:14

## 2017-12-25 RX ADMIN — BUMETANIDE 0.5 MG: 1 TABLET ORAL at 09:44

## 2017-12-25 RX ADMIN — POTASSIUM CHLORIDE 20 MEQ: 1.5 POWDER, FOR SOLUTION ORAL at 09:13

## 2017-12-25 RX ADMIN — PANTOPRAZOLE SODIUM 40 MG: 40 TABLET, DELAYED RELEASE ORAL at 07:35

## 2017-12-26 ENCOUNTER — DOCUMENTATION (OUTPATIENT)
Dept: SOCIAL WORK | Facility: HOSPITAL | Age: 57
End: 2017-12-26

## 2017-12-26 ENCOUNTER — TELEPHONE (OUTPATIENT)
Dept: CARDIOLOGY | Facility: CLINIC | Age: 57
End: 2017-12-26

## 2017-12-26 VITALS
BODY MASS INDEX: 44.3 KG/M2 | DIASTOLIC BLOOD PRESSURE: 68 MMHG | HEIGHT: 63 IN | HEART RATE: 64 BPM | WEIGHT: 250 LBS | RESPIRATION RATE: 18 BRPM | TEMPERATURE: 97.6 F | SYSTOLIC BLOOD PRESSURE: 98 MMHG | OXYGEN SATURATION: 87 %

## 2017-12-26 LAB
ANION GAP SERPL CALCULATED.3IONS-SCNC: 10 MMOL/L (ref 3–11)
BH CV ECHO MEAS - RAP SYSTOLE: 10 MMHG
BH CV ECHO MEAS - RVSP: 28 MMHG
BH CV ECHO MEAS - TR MAX V: 18 MMHG
BH CV ECHO MEAS - TR MAX VEL: 214 CM/SEC
BUN BLD-MCNC: 11 MG/DL (ref 9–23)
BUN/CREAT SERPL: 13.8 (ref 7–25)
CALCIUM SPEC-SCNC: 9 MG/DL (ref 8.7–10.4)
CHLORIDE SERPL-SCNC: 95 MMOL/L (ref 99–109)
CO2 SERPL-SCNC: 34 MMOL/L (ref 20–31)
CREAT BLD-MCNC: 0.8 MG/DL (ref 0.6–1.3)
GFR SERPL CREATININE-BSD FRML MDRD: 74 ML/MIN/1.73
GLUCOSE BLD-MCNC: 86 MG/DL (ref 70–100)
GLUCOSE BLDC GLUCOMTR-MCNC: 100 MG/DL (ref 70–130)
POTASSIUM BLD-SCNC: 3.7 MMOL/L (ref 3.5–5.5)
SODIUM BLD-SCNC: 139 MMOL/L (ref 132–146)

## 2017-12-26 PROCEDURE — 80048 BASIC METABOLIC PNL TOTAL CA: CPT | Performed by: NURSE PRACTITIONER

## 2017-12-26 PROCEDURE — 99225 PR SBSQ OBSERVATION CARE/DAY 25 MINUTES: CPT | Performed by: INTERNAL MEDICINE

## 2017-12-26 PROCEDURE — 93010 ELECTROCARDIOGRAM REPORT: CPT | Performed by: INTERNAL MEDICINE

## 2017-12-26 PROCEDURE — G0378 HOSPITAL OBSERVATION PER HR: HCPCS

## 2017-12-26 PROCEDURE — 82962 GLUCOSE BLOOD TEST: CPT

## 2017-12-26 RX ORDER — POTASSIUM CHLORIDE 1500 MG/1
20 TABLET, FILM COATED, EXTENDED RELEASE ORAL DAILY
Status: DISCONTINUED | OUTPATIENT
Start: 2017-12-26 | End: 2017-12-26 | Stop reason: HOSPADM

## 2017-12-26 RX ORDER — BUDESONIDE AND FORMOTEROL FUMARATE DIHYDRATE 160; 4.5 UG/1; UG/1
2 AEROSOL RESPIRATORY (INHALATION)
Qty: 1 INHALER | Refills: 12 | Status: SHIPPED | OUTPATIENT
Start: 2017-12-26 | End: 2018-03-14

## 2017-12-26 RX ORDER — SOTALOL HYDROCHLORIDE 80 MG/1
40 TABLET ORAL EVERY 12 HOURS SCHEDULED
Qty: 30 TABLET | Refills: 5 | Status: SHIPPED | OUTPATIENT
Start: 2017-12-26 | End: 2018-03-21 | Stop reason: HOSPADM

## 2017-12-26 RX ORDER — ASPIRIN 81 MG/1
81 TABLET ORAL DAILY
Qty: 30 TABLET | Refills: 5 | Status: SHIPPED | OUTPATIENT
Start: 2017-12-26

## 2017-12-26 RX ORDER — TORSEMIDE 10 MG/1
10 TABLET ORAL DAILY
Qty: 30 TABLET | Refills: 5 | Status: SHIPPED | OUTPATIENT
Start: 2017-12-26 | End: 2018-03-14

## 2017-12-26 RX ORDER — METFORMIN HYDROCHLORIDE 500 MG/1
500 TABLET, EXTENDED RELEASE ORAL 2 TIMES DAILY
Qty: 60 TABLET | Refills: 5 | Status: SHIPPED | OUTPATIENT
Start: 2017-12-26 | End: 2018-03-14

## 2017-12-26 RX ADMIN — METFORMIN HYDROCHLORIDE 500 MG: 500 TABLET, EXTENDED RELEASE ORAL at 09:23

## 2017-12-26 RX ADMIN — SOTALOL HYDROCHLORIDE 40 MG: 80 TABLET ORAL at 07:04

## 2017-12-26 RX ADMIN — BUMETANIDE 0.5 MG: 1 TABLET ORAL at 09:24

## 2017-12-26 RX ADMIN — ASPIRIN 81 MG: 81 TABLET, COATED ORAL at 09:23

## 2017-12-26 RX ADMIN — CITALOPRAM HYDROBROMIDE 40 MG: 40 TABLET ORAL at 09:23

## 2017-12-26 RX ADMIN — PANTOPRAZOLE SODIUM 40 MG: 40 TABLET, DELAYED RELEASE ORAL at 07:04

## 2017-12-26 NOTE — TELEPHONE ENCOUNTER
You saw this patient in the hospital and the Symbicort is not covered and they want to know if it can be changed to one the formulary drugs such as Beclomethasone dipropionate?

## 2018-01-30 ENCOUNTER — TELEPHONE (OUTPATIENT)
Dept: CARDIOLOGY | Facility: CLINIC | Age: 58
End: 2018-01-30

## 2018-01-30 ENCOUNTER — HOSPITAL ENCOUNTER (EMERGENCY)
Age: 58
Discharge: ANOTHER HOSPITAL | End: 2018-01-30
Payer: MEDICARE

## 2018-01-30 VITALS
TEMPERATURE: 98.5 F | RESPIRATION RATE: 16 BRPM | DIASTOLIC BLOOD PRESSURE: 66 MMHG | SYSTOLIC BLOOD PRESSURE: 114 MMHG | OXYGEN SATURATION: 94 % | HEART RATE: 83 BPM

## 2018-01-30 VITALS
SYSTOLIC BLOOD PRESSURE: 107 MMHG | OXYGEN SATURATION: 94 % | RESPIRATION RATE: 16 BRPM | DIASTOLIC BLOOD PRESSURE: 82 MMHG | HEART RATE: 112 BPM | TEMPERATURE: 98.6 F

## 2018-01-30 VITALS
RESPIRATION RATE: 20 BRPM | DIASTOLIC BLOOD PRESSURE: 70 MMHG | SYSTOLIC BLOOD PRESSURE: 106 MMHG | OXYGEN SATURATION: 94 % | HEART RATE: 108 BPM

## 2018-01-30 VITALS
SYSTOLIC BLOOD PRESSURE: 100 MMHG | RESPIRATION RATE: 20 BRPM | HEART RATE: 110 BPM | DIASTOLIC BLOOD PRESSURE: 75 MMHG | OXYGEN SATURATION: 94 %

## 2018-01-30 VITALS — BODY MASS INDEX: 44.2 KG/M2

## 2018-01-30 DIAGNOSIS — I50.9: ICD-10-CM

## 2018-01-30 DIAGNOSIS — Z79.84: ICD-10-CM

## 2018-01-30 DIAGNOSIS — D75.1: ICD-10-CM

## 2018-01-30 DIAGNOSIS — Z79.899: ICD-10-CM

## 2018-01-30 DIAGNOSIS — I26.99: ICD-10-CM

## 2018-01-30 DIAGNOSIS — Z99.81: ICD-10-CM

## 2018-01-30 DIAGNOSIS — F17.210: ICD-10-CM

## 2018-01-30 DIAGNOSIS — J44.9: ICD-10-CM

## 2018-01-30 DIAGNOSIS — I48.92: Primary | ICD-10-CM

## 2018-01-30 DIAGNOSIS — I48.0: ICD-10-CM

## 2018-01-30 DIAGNOSIS — E11.9: ICD-10-CM

## 2018-01-30 DIAGNOSIS — Z79.01: ICD-10-CM

## 2018-01-30 LAB
ALBUMIN LEVEL: 3.1 GM/DL (ref 3.4–5)
ALBUMIN/GLOB SERPL: 0.6 {RATIO} (ref 1.1–1.8)
ALP ISO SERPL-ACNC: 135 U/L (ref 46–116)
ALT SERPLBLD-CCNC: 21 U/L (ref 12–78)
ANION GAP SERPL CALC-SCNC: 11.3 MEQ/L (ref 5–15)
AST SERPL QL: 17 U/L (ref 15–37)
BILIRUBIN,TOTAL: 0.6 MG/DL (ref 0.2–1)
BUN SERPL-MCNC: 15 MG/DL (ref 7–18)
CALCIUM SPEC-MCNC: 9 MG/DL (ref 8.5–10.1)
CHLORIDE SPEC-SCNC: 101 MMOL/L (ref 98–107)
CK SERPL-CCNC: 43 U/L (ref 26–192)
CKMB RELATIVE INDEX: 1.2 U/L (ref 0–4)
CO2 SERPL-SCNC: 29 MMOL/L (ref 21–32)
CREAT BLD-SCNC: 0.98 MG/DL (ref 0.55–1.02)
CREATININE CLEARANCE ESTIMATED: 53 ML/MIN (ref 0–300)
D-DIMER: 526 (ref 0–400)
ESTIMATED GLOMERULAR FILT RATE: 59 ML/MIN (ref 60–?)
GFR (AFRICAN AMERICAN): 71 ML/MIN (ref 60–?)
GLOBULIN SER CALC-MCNC: 5.4 GM/DL (ref 1.3–3.2)
GLUCOSE: 128 MG/DL (ref 74–106)
HCT VFR BLD CALC: 55.8 % (ref 37–47)
HGB BLD-MCNC: 18.4 G/DL (ref 12.2–16.2)
MAGNESIUM: 1.6 MG/DL (ref 1.4–2.2)
MCHC RBC-ENTMCNC: 32.9 G/DL (ref 31.8–35.4)
MCV RBC: 93.5 FL (ref 81–99)
MEAN CORPUSCULAR HEMOGLOBIN: 30.8 PG (ref 27–31.2)
PLATELET # BLD: 243 K/MM3 (ref 142–424)
POTASSIUM: 4.3 MMOL/L (ref 3.5–5.1)
PROT SERPL-MCNC: 8.5 GM/DL (ref 6.4–8.2)
RBC # BLD AUTO: 5.97 M/MM3 (ref 4.2–5.4)
SODIUM SPEC-SCNC: 137 MMOL/L (ref 136–145)
T4 FREE SERPL-MCNC: 1.42 NG/DL (ref 0.76–1.46)
TROPONIN I: < 0.02 NG/ML (ref 0–0.06)
TSH SERPL-ACNC: 1.76 UIU/ML (ref 0.36–3.74)
WBC # BLD AUTO: 12.1 K/MM3 (ref 4.8–10.8)

## 2018-01-30 PROCEDURE — 85378 FIBRIN DEGRADE SEMIQUANT: CPT

## 2018-01-30 PROCEDURE — 82550 ASSAY OF CK (CPK): CPT

## 2018-01-30 PROCEDURE — 82553 CREATINE MB FRACTION: CPT

## 2018-01-30 PROCEDURE — 96372 THER/PROPH/DIAG INJ SC/IM: CPT

## 2018-01-30 PROCEDURE — 71046 X-RAY EXAM CHEST 2 VIEWS: CPT

## 2018-01-30 PROCEDURE — 93005 ELECTROCARDIOGRAM TRACING: CPT

## 2018-01-30 PROCEDURE — 83735 ASSAY OF MAGNESIUM: CPT

## 2018-01-30 PROCEDURE — 36415 COLL VENOUS BLD VENIPUNCTURE: CPT

## 2018-01-30 PROCEDURE — 80053 COMPREHEN METABOLIC PANEL: CPT

## 2018-01-30 PROCEDURE — 84443 ASSAY THYROID STIM HORMONE: CPT

## 2018-01-30 PROCEDURE — 84439 ASSAY OF FREE THYROXINE: CPT

## 2018-01-30 PROCEDURE — 93041 RHYTHM ECG TRACING: CPT

## 2018-01-30 PROCEDURE — 71275 CT ANGIOGRAPHY CHEST: CPT

## 2018-01-30 PROCEDURE — 85025 COMPLETE CBC W/AUTO DIFF WBC: CPT

## 2018-01-30 PROCEDURE — 84484 ASSAY OF TROPONIN QUANT: CPT

## 2018-01-30 PROCEDURE — 83880 ASSAY OF NATRIURETIC PEPTIDE: CPT

## 2018-01-30 PROCEDURE — 99284 EMERGENCY DEPT VISIT MOD MDM: CPT

## 2018-01-30 NOTE — TELEPHONE ENCOUNTER
The patient called stating that her HR has been consistently in the 130's, but BP is unavailable.  I spoke with MJS and he recommended that she come in for EKG and BP check, and potentially schedule ECV depending on ECG.  Upon return call the patient was already en route to [facility] and did not wish to drive all the way to [place].  I asked that she call us to update us on her condition, and to please get the records from the visit or have them sent to us.  Understanding verbalized at this time.

## 2018-02-05 PROBLEM — I26.99 PULMONARY EMBOLISM (HCC): Status: ACTIVE | Noted: 2018-02-05

## 2018-02-21 ENCOUNTER — TELEPHONE (OUTPATIENT)
Dept: CARDIOLOGY | Facility: CLINIC | Age: 58
End: 2018-02-21

## 2018-02-21 NOTE — TELEPHONE ENCOUNTER
The patient called stating that she has had HR trending around 115 which will occasionally drop into the 60's, but that is not sustained.  Per MJS I advised that he would like her to be seen by SD for possible ablation.  VM left for scheduling asking that she be seen by SD for this.  Understanding verbalized.

## 2018-03-14 ENCOUNTER — HOSPITAL ENCOUNTER (INPATIENT)
Facility: HOSPITAL | Age: 58
LOS: 7 days | Discharge: HOME HEALTH | End: 2018-03-21
Attending: EMERGENCY MEDICINE | Admitting: INTERNAL MEDICINE

## 2018-03-14 ENCOUNTER — APPOINTMENT (OUTPATIENT)
Dept: GENERAL RADIOLOGY | Facility: HOSPITAL | Age: 58
End: 2018-03-14

## 2018-03-14 DIAGNOSIS — I48.4 ATYPICAL ATRIAL FLUTTER (HCC): ICD-10-CM

## 2018-03-14 DIAGNOSIS — R09.02 HYPOXIA: ICD-10-CM

## 2018-03-14 DIAGNOSIS — I50.41 ACUTE COMBINED SYSTOLIC AND DIASTOLIC CONGESTIVE HEART FAILURE (HCC): Primary | ICD-10-CM

## 2018-03-14 LAB
ALBUMIN SERPL-MCNC: 3.7 G/DL (ref 3.2–4.8)
ALBUMIN/GLOB SERPL: 1 G/DL (ref 1.5–2.5)
ALP SERPL-CCNC: 106 U/L (ref 25–100)
ALT SERPL W P-5'-P-CCNC: 192 U/L (ref 7–40)
ANION GAP SERPL CALCULATED.3IONS-SCNC: 5 MMOL/L (ref 3–11)
ANION GAP SERPL CALCULATED.3IONS-SCNC: 5 MMOL/L (ref 3–11)
AST SERPL-CCNC: 41 U/L (ref 0–33)
BASOPHILS # BLD AUTO: 0.02 10*3/MM3 (ref 0–0.2)
BASOPHILS NFR BLD AUTO: 0.2 % (ref 0–1)
BILIRUB SERPL-MCNC: 1 MG/DL (ref 0.3–1.2)
BNP SERPL-MCNC: 522 PG/ML (ref 0–100)
BUN BLD-MCNC: 11 MG/DL (ref 9–23)
BUN BLD-MCNC: 12 MG/DL (ref 9–23)
BUN/CREAT SERPL: 15 (ref 7–25)
BUN/CREAT SERPL: 15.7 (ref 7–25)
CALCIUM SPEC-SCNC: 8.8 MG/DL (ref 8.7–10.4)
CALCIUM SPEC-SCNC: 9.1 MG/DL (ref 8.7–10.4)
CHLORIDE SERPL-SCNC: 93 MMOL/L (ref 99–109)
CHLORIDE SERPL-SCNC: 96 MMOL/L (ref 99–109)
CO2 SERPL-SCNC: 38 MMOL/L (ref 20–31)
CO2 SERPL-SCNC: 40 MMOL/L (ref 20–31)
CREAT BLD-MCNC: 0.7 MG/DL (ref 0.6–1.3)
CREAT BLD-MCNC: 0.8 MG/DL (ref 0.6–1.3)
DEPRECATED RDW RBC AUTO: 57.5 FL (ref 37–54)
EOSINOPHIL # BLD AUTO: 0.13 10*3/MM3 (ref 0–0.3)
EOSINOPHIL NFR BLD AUTO: 1.1 % (ref 0–3)
ERYTHROCYTE [DISTWIDTH] IN BLOOD BY AUTOMATED COUNT: 15.9 % (ref 11.3–14.5)
GFR SERPL CREATININE-BSD FRML MDRD: 74 ML/MIN/1.73
GFR SERPL CREATININE-BSD FRML MDRD: 87 ML/MIN/1.73
GLOBULIN UR ELPH-MCNC: 3.7 GM/DL
GLUCOSE BLD-MCNC: 89 MG/DL (ref 70–100)
GLUCOSE BLD-MCNC: 98 MG/DL (ref 70–100)
GLUCOSE BLDC GLUCOMTR-MCNC: 88 MG/DL (ref 70–130)
HBA1C MFR BLD: 6.6 % (ref 4.8–5.6)
HCT VFR BLD AUTO: 45.1 % (ref 34.5–44)
HGB BLD-MCNC: 13.8 G/DL (ref 11.5–15.5)
HOLD SPECIMEN: NORMAL
HOLD SPECIMEN: NORMAL
IMM GRANULOCYTES # BLD: 0.06 10*3/MM3 (ref 0–0.03)
IMM GRANULOCYTES NFR BLD: 0.5 % (ref 0–0.6)
LYMPHOCYTES # BLD AUTO: 1.05 10*3/MM3 (ref 0.6–4.8)
LYMPHOCYTES NFR BLD AUTO: 8.6 % (ref 24–44)
MCH RBC QN AUTO: 30.9 PG (ref 27–31)
MCHC RBC AUTO-ENTMCNC: 30.6 G/DL (ref 32–36)
MCV RBC AUTO: 100.9 FL (ref 80–99)
MONOCYTES # BLD AUTO: 1.06 10*3/MM3 (ref 0–1)
MONOCYTES NFR BLD AUTO: 8.7 % (ref 0–12)
NEUTROPHILS # BLD AUTO: 9.87 10*3/MM3 (ref 1.5–8.3)
NEUTROPHILS NFR BLD AUTO: 80.9 % (ref 41–71)
PLATELET # BLD AUTO: 192 10*3/MM3 (ref 150–450)
PMV BLD AUTO: 11.4 FL (ref 6–12)
POTASSIUM BLD-SCNC: 3.5 MMOL/L (ref 3.5–5.5)
POTASSIUM BLD-SCNC: 3.7 MMOL/L (ref 3.5–5.5)
PROT SERPL-MCNC: 7.4 G/DL (ref 5.7–8.2)
RBC # BLD AUTO: 4.47 10*6/MM3 (ref 3.89–5.14)
SODIUM BLD-SCNC: 138 MMOL/L (ref 132–146)
SODIUM BLD-SCNC: 139 MMOL/L (ref 132–146)
TROPONIN I SERPL-MCNC: 0 NG/ML (ref 0–0.07)
TROPONIN I SERPL-MCNC: 0.01 NG/ML (ref 0–0.07)
WBC NRBC COR # BLD: 12.19 10*3/MM3 (ref 3.5–10.8)
WHOLE BLOOD HOLD SPECIMEN: NORMAL
WHOLE BLOOD HOLD SPECIMEN: NORMAL

## 2018-03-14 PROCEDURE — 82962 GLUCOSE BLOOD TEST: CPT

## 2018-03-14 PROCEDURE — 93005 ELECTROCARDIOGRAM TRACING: CPT | Performed by: EMERGENCY MEDICINE

## 2018-03-14 PROCEDURE — 25010000002 FUROSEMIDE PER 20 MG: Performed by: EMERGENCY MEDICINE

## 2018-03-14 PROCEDURE — 71045 X-RAY EXAM CHEST 1 VIEW: CPT

## 2018-03-14 PROCEDURE — 25010000002 FUROSEMIDE PER 20 MG: Performed by: NURSE PRACTITIONER

## 2018-03-14 PROCEDURE — 84484 ASSAY OF TROPONIN QUANT: CPT

## 2018-03-14 PROCEDURE — 83036 HEMOGLOBIN GLYCOSYLATED A1C: CPT | Performed by: NURSE PRACTITIONER

## 2018-03-14 PROCEDURE — 83880 ASSAY OF NATRIURETIC PEPTIDE: CPT | Performed by: EMERGENCY MEDICINE

## 2018-03-14 PROCEDURE — 99223 1ST HOSP IP/OBS HIGH 75: CPT | Performed by: INTERNAL MEDICINE

## 2018-03-14 PROCEDURE — 85025 COMPLETE CBC W/AUTO DIFF WBC: CPT | Performed by: EMERGENCY MEDICINE

## 2018-03-14 PROCEDURE — 99285 EMERGENCY DEPT VISIT HI MDM: CPT

## 2018-03-14 PROCEDURE — 80053 COMPREHEN METABOLIC PANEL: CPT | Performed by: EMERGENCY MEDICINE

## 2018-03-14 PROCEDURE — 63710000001 INSULIN LISPRO (HUMAN) PER 5 UNITS: Performed by: NURSE PRACTITIONER

## 2018-03-14 RX ORDER — CITALOPRAM 20 MG/1
40 TABLET ORAL DAILY
Status: DISCONTINUED | OUTPATIENT
Start: 2018-03-15 | End: 2018-03-21 | Stop reason: HOSPADM

## 2018-03-14 RX ORDER — ALBUTEROL SULFATE 2.5 MG/3ML
2.5 SOLUTION RESPIRATORY (INHALATION) EVERY 4 HOURS PRN
Status: DISCONTINUED | OUTPATIENT
Start: 2018-03-14 | End: 2018-03-21 | Stop reason: HOSPADM

## 2018-03-14 RX ORDER — SODIUM CHLORIDE 0.9 % (FLUSH) 0.9 %
10 SYRINGE (ML) INJECTION AS NEEDED
Status: DISCONTINUED | OUTPATIENT
Start: 2018-03-14 | End: 2018-03-21 | Stop reason: HOSPADM

## 2018-03-14 RX ORDER — METFORMIN HYDROCHLORIDE 500 MG/1
1000 TABLET, EXTENDED RELEASE ORAL 2 TIMES DAILY
COMMUNITY

## 2018-03-14 RX ORDER — DIGOXIN 125 MCG
125 TABLET ORAL
COMMUNITY
End: 2018-03-21 | Stop reason: HOSPADM

## 2018-03-14 RX ORDER — DEXTROSE MONOHYDRATE 25 G/50ML
25 INJECTION, SOLUTION INTRAVENOUS
Status: DISCONTINUED | OUTPATIENT
Start: 2018-03-14 | End: 2018-03-21 | Stop reason: HOSPADM

## 2018-03-14 RX ORDER — POTASSIUM CHLORIDE 750 MG/1
40 CAPSULE, EXTENDED RELEASE ORAL AS NEEDED
Status: DISCONTINUED | OUTPATIENT
Start: 2018-03-14 | End: 2018-03-21 | Stop reason: HOSPADM

## 2018-03-14 RX ORDER — POTASSIUM CHLORIDE 20 MEQ/1
20 TABLET, EXTENDED RELEASE ORAL DAILY
COMMUNITY

## 2018-03-14 RX ORDER — PANTOPRAZOLE SODIUM 40 MG/1
40 TABLET, DELAYED RELEASE ORAL EVERY MORNING
Status: DISCONTINUED | OUTPATIENT
Start: 2018-03-15 | End: 2018-03-21 | Stop reason: HOSPADM

## 2018-03-14 RX ORDER — TORSEMIDE 10 MG/1
10 TABLET ORAL DAILY
Status: ON HOLD | COMMUNITY
End: 2018-03-21

## 2018-03-14 RX ORDER — ASPIRIN 81 MG/1
81 TABLET ORAL DAILY
Status: DISCONTINUED | OUTPATIENT
Start: 2018-03-15 | End: 2018-03-21 | Stop reason: HOSPADM

## 2018-03-14 RX ORDER — BUDESONIDE AND FORMOTEROL FUMARATE DIHYDRATE 160; 4.5 UG/1; UG/1
2 AEROSOL RESPIRATORY (INHALATION)
Status: DISCONTINUED | OUTPATIENT
Start: 2018-03-14 | End: 2018-03-15

## 2018-03-14 RX ORDER — SOTALOL HYDROCHLORIDE 80 MG/1
40 TABLET ORAL EVERY 12 HOURS SCHEDULED
Status: DISCONTINUED | OUTPATIENT
Start: 2018-03-14 | End: 2018-03-15

## 2018-03-14 RX ORDER — METFORMIN HYDROCHLORIDE 500 MG/1
500 TABLET, EXTENDED RELEASE ORAL 2 TIMES DAILY WITH MEALS
Status: DISCONTINUED | OUTPATIENT
Start: 2018-03-15 | End: 2018-03-21 | Stop reason: HOSPADM

## 2018-03-14 RX ORDER — FUROSEMIDE 10 MG/ML
40 INJECTION INTRAMUSCULAR; INTRAVENOUS ONCE
Status: COMPLETED | OUTPATIENT
Start: 2018-03-14 | End: 2018-03-14

## 2018-03-14 RX ORDER — LEVALBUTEROL INHALATION SOLUTION 1.25 MG/3ML
1 SOLUTION RESPIRATORY (INHALATION) 3 TIMES DAILY
COMMUNITY
End: 2018-03-14

## 2018-03-14 RX ORDER — CETIRIZINE HYDROCHLORIDE 10 MG/1
10 TABLET ORAL DAILY
Status: DISCONTINUED | OUTPATIENT
Start: 2018-03-15 | End: 2018-03-21 | Stop reason: HOSPADM

## 2018-03-14 RX ORDER — SODIUM CHLORIDE 0.9 % (FLUSH) 0.9 %
1-10 SYRINGE (ML) INJECTION AS NEEDED
Status: DISCONTINUED | OUTPATIENT
Start: 2018-03-14 | End: 2018-03-21 | Stop reason: HOSPADM

## 2018-03-14 RX ORDER — FUROSEMIDE 10 MG/ML
40 INJECTION INTRAMUSCULAR; INTRAVENOUS EVERY 12 HOURS
Status: DISCONTINUED | OUTPATIENT
Start: 2018-03-14 | End: 2018-03-20

## 2018-03-14 RX ORDER — GABAPENTIN 300 MG/1
300 CAPSULE ORAL NIGHTLY
COMMUNITY
End: 2018-11-19

## 2018-03-14 RX ORDER — DOXYCYCLINE HYCLATE 100 MG/1
100 CAPSULE ORAL 2 TIMES DAILY
COMMUNITY
Start: 2018-03-12 | End: 2018-03-21 | Stop reason: HOSPADM

## 2018-03-14 RX ORDER — POTASSIUM CHLORIDE 750 MG/1
20 CAPSULE, EXTENDED RELEASE ORAL DAILY
Status: DISCONTINUED | OUTPATIENT
Start: 2018-03-15 | End: 2018-03-21 | Stop reason: HOSPADM

## 2018-03-14 RX ORDER — NICOTINE POLACRILEX 4 MG
15 LOZENGE BUCCAL
Status: DISCONTINUED | OUTPATIENT
Start: 2018-03-14 | End: 2018-03-21 | Stop reason: HOSPADM

## 2018-03-14 RX ORDER — ACETAMINOPHEN 325 MG/1
650 TABLET ORAL EVERY 4 HOURS PRN
Status: DISCONTINUED | OUTPATIENT
Start: 2018-03-14 | End: 2018-03-21 | Stop reason: HOSPADM

## 2018-03-14 RX ORDER — ALBUTEROL SULFATE 90 UG/1
2 AEROSOL, METERED RESPIRATORY (INHALATION) EVERY 6 HOURS PRN
COMMUNITY

## 2018-03-14 RX ORDER — POTASSIUM CHLORIDE 7.45 MG/ML
10 INJECTION INTRAVENOUS
Status: DISCONTINUED | OUTPATIENT
Start: 2018-03-14 | End: 2018-03-21 | Stop reason: HOSPADM

## 2018-03-14 RX ORDER — CITALOPRAM 20 MG/1
20 TABLET ORAL DAILY
Status: ON HOLD | COMMUNITY
End: 2018-07-31

## 2018-03-14 RX ORDER — POTASSIUM CHLORIDE 1.5 G/1.77G
40 POWDER, FOR SOLUTION ORAL AS NEEDED
Status: DISCONTINUED | OUTPATIENT
Start: 2018-03-14 | End: 2018-03-21 | Stop reason: HOSPADM

## 2018-03-14 RX ADMIN — APIXABAN 5 MG: 5 TABLET, FILM COATED ORAL at 21:59

## 2018-03-14 RX ADMIN — SOTALOL HYDROCHLORIDE 40 MG: 80 TABLET ORAL at 22:14

## 2018-03-14 RX ADMIN — FUROSEMIDE 40 MG: 10 INJECTION, SOLUTION INTRAMUSCULAR; INTRAVENOUS at 17:21

## 2018-03-14 NOTE — H&P
History & Physical  [facility]    Referring Physician: Dr. Ferreira    Patient Care Team:  Patient Care Team:  Jessica Portillo as PCP - General (Nurse Practitioner)    Reason for consultation:   Chief Complaint   Patient presents with   • Edema       HPI:    Maria T Garcia is a 57 y.o. female.  History of Present Illness    The patient has a history of remote SVT s/p abalation (IDB), recurrent SVT s/p MARY/ECV 12/2017, systolic and diastolic HF, PE on NOAC, DM, COPD, tobacco dependence, obesity, who presents to [facility] ED today with complaints of a weight gain of 30 lbs. Over 2 weeks.  She weight 249 lbs. On 2/21/18 per her PCP's records and today in their office she weighed 276 lbs. She was recently treated for pneumonia and sepsis at [facility] and was discharged two days ago. She states that someone has D/C'd her fluid pills, but she is unsure who.  She has noticed worsening dyspnea since being discharged and has had to occasionally turn her oxygen at home up to 3.5 - 4L until she can catch her breath.  She chronically wears 3L at home.  She denies any chest pain, palpitations, lower extremity edema, or palpitations.  She has been previously referred to  Dr. Toscano for evaluation of prior SVT episodes.  She was told she had PE 2-3 months ago and her Xarelto was switched to Eliquis at that time.    PFSH:  Patient Active Problem List   Diagnosis   • Sustained SVT   • COPD (chronic obstructive pulmonary disease)   • Diabetes mellitus   • Neuropathy   • Tobacco abuse   • CHF (congestive heart failure)   • Class 3 obesity in adult   • Atrial fibrillation with rapid ventricular response   • Pulmonary embolism       No current facility-administered medications on file prior to encounter.      Current Outpatient Prescriptions on File Prior to Encounter   Medication Sig Dispense Refill   • acetaminophen (TYLENOL) 650 MG 8 hr tablet Take 650 mg by mouth Every 8 (Eight) Hours As Needed for Mild Pain .     •  aspirin 81 MG EC tablet Take 1 tablet by mouth Daily. 30 tablet 5   • cetirizine (zyrTEC) 10 MG tablet Take 10 mg by mouth Daily.     • citalopram (CeleXA) 40 MG tablet Take 40 mg by mouth Daily.     • hydrOXYzine (VISTARIL) 50 MG capsule Take 50 mg by mouth 3 (Three) Times a Day As Needed for Itching.     • metFORMIN ER (GLUCOPHAGE-XR) 500 MG 24 hr tablet Take 1 tablet by mouth 2 (Two) Times a Day. 60 tablet 5   • omeprazole (priLOSEC) 40 MG capsule Take 40 mg by mouth Daily.     • potassium chloride (K-DUR,KLOR-CON) 10 MEQ CR tablet Take 20 mEq by mouth Daily.     • rivaroxaban (XARELTO) 20 MG tablet Take 1 tablet by mouth Daily With Dinner. 30 tablet 5   • budesonide-formoterol (SYMBICORT) 160-4.5 MCG/ACT inhaler Inhale 2 puffs 2 (Two) Times a Day. 1 inhaler 12   • sotalol (BETAPACE) 80 MG tablet Take 0.5 tablets by mouth Every 12 (Twelve) Hours. 30 tablet 5   • torsemide (DEMADEX) 10 MG tablet Take 1 tablet by mouth Daily. 30 tablet 5     Patients notes she is not currently taking Xarelto, she states she is taking Eliquis.          Allergies   Allergen Reactions   • Codeine Shortness Of Breath   • Morphine And Related Shortness Of Breath   • Naproxen Hives       Social History     Social History   • Marital status: Single     Social History Main Topics   • Smoking status: Current Every Day Smoker     Packs/day: 1.00     Types: Cigarettes   • Smokeless tobacco: Never Used   • Alcohol use No   • Drug use: No   • Sexual activity: Defer     Other Topics Concern   • Not on file     History reviewed. No pertinent family history.    Review of Systems:  Positive for dyspnea.  Negative for chest pain, palpitations, lower extremity edema.  All other systems reviewed are negative.         Objective:     Vital Sign Min/Max for last 24 hours  Temp  Min: 98.2 °F (36.8 °C)  Max: 98.2 °F (36.8 °C)   BP  Min: 124/103  Max: 131/85   Pulse  Min: 92  Max: 135   Resp  Min: 26  Max: 26   SpO2  Min: 80 %  Max: 94 %   No Data Recorded     No intake or output data in the 24 hours ending 03/14/18 1551        Vitals:    03/14/18 1445   BP: 129/94   Pulse: (!) 135   Resp:    Temp:    SpO2: 92%       CONSTITUTIONAL: Well-nourished. In no acute distress.   SKIN: Warm and dry. No rashes noted  HEENT: Head is normocephalic and atraumatic. Mucous membranes are pink and moist.   NECK: Supple without masses or thyromegaly. There is no jugular venous distention at 30°.  LUNGS: Normal effort. Dimished bilaterally, rales noted.   CARDIOVASCULAR: The heart has a irregular rhythm and a normal with a normal S1 and S2. There is no murmur, gallop, rub, or click appreciated.   PERIPHERAL VASCULAR: Carotid upstroke is 2+ bilaterally and without bruits. Radial pulses are 2+ bilaterally. Posterior tibial pulses are 2+ and symmetrical. There is 2+ lower extremity edema.   ABDOMEN: Soft with no tenderness with palpitation. No hepatosplenomegaly  MUSCULOSKELETAL:  No digital cyanosis  NEUROLOGICAL: Nonfocal.  PSYCHIATRIC: Alert, orientated x 3, appropriate affect     Labs:  WBC   Date Value Ref Range Status   03/14/2018 12.19 (H) 3.50 - 10.80 10*3/mm3 Final     RBC   Date Value Ref Range Status   03/14/2018 4.47 3.89 - 5.14 10*6/mm3 Final     Hemoglobin   Date Value Ref Range Status   03/14/2018 13.8 11.5 - 15.5 g/dL Final     Hematocrit   Date Value Ref Range Status   03/14/2018 45.1 (H) 34.5 - 44.0 % Final     MCV   Date Value Ref Range Status   03/14/2018 100.9 (H) 80.0 - 99.0 fL Final     MCH   Date Value Ref Range Status   03/14/2018 30.9 27.0 - 31.0 pg Final     MCHC   Date Value Ref Range Status   03/14/2018 30.6 (L) 32.0 - 36.0 g/dL Final     RDW   Date Value Ref Range Status   03/14/2018 15.9 (H) 11.3 - 14.5 % Final     RDW-SD   Date Value Ref Range Status   03/14/2018 57.5 (H) 37.0 - 54.0 fl Final     MPV   Date Value Ref Range Status   03/14/2018 11.4 6.0 - 12.0 fL Final     Platelets   Date Value Ref Range Status   03/14/2018 192 150 - 450 10*3/mm3 Final      Neutrophil %   Date Value Ref Range Status   2018 80.9 (H) 41.0 - 71.0 % Final     Lymphocyte %   Date Value Ref Range Status   2018 8.6 (L) 24.0 - 44.0 % Final     Monocyte %   Date Value Ref Range Status   2018 8.7 0.0 - 12.0 % Final     Eosinophil %   Date Value Ref Range Status   2018 1.1 0.0 - 3.0 % Final     Basophil %   Date Value Ref Range Status   2018 0.2 0.0 - 1.0 % Final     Immature Grans %   Date Value Ref Range Status   2018 0.5 0.0 - 0.6 % Final     Neutrophils, Absolute   Date Value Ref Range Status   2018 9.87 (H) 1.50 - 8.30 10*3/mm3 Final     Lymphocytes, Absolute   Date Value Ref Range Status   2018 1.05 0.60 - 4.80 10*3/mm3 Final     Monocytes, Absolute   Date Value Ref Range Status   2018 1.06 (H) 0.00 - 1.00 10*3/mm3 Final     Eosinophils, Absolute   Date Value Ref Range Status   2018 0.13 0.00 - 0.30 10*3/mm3 Final     Basophils, Absolute   Date Value Ref Range Status   2018 0.02 0.00 - 0.20 10*3/mm3 Final     Immature Grans, Absolute   Date Value Ref Range Status   2018 0.06 (H) 0.00 - 0.03 10*3/mm3 Final     Lab Results   Component Value Date    GLUCOSE 98 2018    BUN 12 2018    CREATININE 0.80 2018    EGFRIFNONA 74 2018    BCR 15.0 2018    K 3.7 2018    CO2 38.0 (H) 2018    CALCIUM 8.8 2018    ALBUMIN 3.70 2018    LABIL2 1.0 (L) 2018    AST 41 (H) 2018     (H) 2018       Diagnostic Data:    EK:1 atrial tachycardia    Tele:  2:1 atrial tachycardia    EMS rhythm strip reported wide complex tachycardia reviewed but is actually motion artifact.    TTE 17  · Left ventricular systolic function is low normal. . Estimated EF appears to be in the range of 46 - 50%.  · Right ventricular cavity is moderately dilated.  · Severely reduced right ventricular systolic function noted.  · Right atrial cavity size is moderately dilated.  · Mild  tricuspid valve regurgitation is present.  · Calculated right ventricular systolic pressure from tricuspid regurgitation is 31 mmHg.         CXR 3/14/18  FINDINGS: Cardiomegaly is noted with perihilar vascular prominence.  Bibasilar airspace opacities are noted with volume reduction and perhaps  small bibasilar subpulmonic effusions. There is cephalization of the  upper lobe veins.          Assessment and Plan:   Assessment  -Presumed acute systolic heart failure, weight gain of 27lbs over 2 weeks.  -Recent new diagnosis of atypical atrial flutter vs atrial tachycardia s/p MARY/ECV 12/2017 here at [facility], now with recurrence of this rhythm with 2:1 conduction  -Prior SVT s/p remote ablation, data deficit  -RV dilitation with severe RV dysfunction  -COPD, currently on chronic O2, symbicort.   -Tobacco dependence  -Obesity    Plan  -Admit to telemetry.  -Continue home medications, except digoxin and torsemide.  -Lasix 40 mg IV BID, potassium replacement protocol.  -Daily weigths, strict I&O's  -Daily BMP  -Future ischemic evaluation (stress test, possibly while admitted, if not performed elsewhere recently)  -EP evaluation for atrial tachycardia, Dr. Toscano.  -AM portable CXR  -Obtain records from [facility] for recent hospitalization.      LISSETTE Esparza obtained past medical, family history, social history, review of systems and functioned as a scribe for the remainder of the dictation for Dr. Couch.    3/14/2018    I, Koby Couch MD, personally performed the services as scribed by the above named individual. I have made any necessary edits and it is both accurate and complete.     Koby Couch MD, MSc, FAC  Interventional Cardiology  [facility]

## 2018-03-14 NOTE — ED PROVIDER NOTES
Subjective   Maria T Garcia is a 57 y.o.female with a history of CHF who presents to the ED with c/o rapid weight gain. Two days ago the patient was discharged from [facility] after being diagnosed with sepsis. Over the past two days the patient has gained 30 pounds. After continued symptoms she presented to her primary care physician for evaluation. At the appointment she was advised to present to the ED for a more thorough evaluation. At the ED she reports that she believes that this is due to her CHF. She was taken off of her fluid pills recently but is unsure of the reason. The patient states that her primary care physician said she was having difficulty breathing, but she does not feel particularly short of breath unless she is exerting herself. She denies recent fever, palpitations, chest pain or any other acute symptoms.    She reports a history of PE 2-3 months ago and has been taking Eliquis.          History provided by:  Patient  Illness   Location:  Abdomen  Quality:  Weight gain  Onset quality:  Gradual  Duration:  2 days  Timing:  Constant  Progression:  Worsening  Chronicity:  New  Relieved by:  None tried  Worsened by:  Nothing  Ineffective treatments:  None tried  Associated symptoms: shortness of breath    Associated symptoms: no abdominal pain, no chest pain, no cough, no fatigue, no fever, no nausea and no vomiting        Review of Systems   Constitutional: Positive for unexpected weight change. Negative for chills, fatigue and fever.   Respiratory: Positive for shortness of breath. Negative for cough.    Cardiovascular: Negative for chest pain.   Gastrointestinal: Negative for abdominal pain, nausea and vomiting.   All other systems reviewed and are negative.      Past Medical History:   Diagnosis Date   • CHF (congestive heart failure)    • COPD (chronic obstructive pulmonary disease)    • Diabetes mellitus    • Hypertension    • Neuropathy        Allergies   Allergen Reactions   •  Codeine Shortness Of Breath   • Morphine And Related Shortness Of Breath   • Naproxen Hives       Past Surgical History:   Procedure Laterality Date   • BREAST SURGERY     •  SECTION     • DENTAL PROCEDURE         History reviewed. No pertinent family history.    Social History     Social History   • Marital status: Single     Social History Main Topics   • Smoking status: Current Every Day Smoker     Packs/day: 1.00     Types: Cigarettes   • Smokeless tobacco: Never Used   • Alcohol use No   • Drug use: No   • Sexual activity: Defer     Other Topics Concern   • Not on file         Objective   Physical Exam   Constitutional: She is oriented to person, place, and time. She appears well-developed and well-nourished. No distress.   HENT:   Head: Normocephalic and atraumatic.   Mouth/Throat: No oropharyngeal exudate.   Eyes: Conjunctivae are normal. No scleral icterus.   Neck: Normal range of motion. Neck supple. No JVD present.   Cardiovascular: Regular rhythm and normal heart sounds.  Tachycardia present.  Exam reveals no gallop and no friction rub.    No murmur heard.  Pulmonary/Chest: Effort normal. No respiratory distress. She has no wheezes. She has rales.   Breathing comfortably. Crackles from the mid lungs down on inspiration bilaterally.   Abdominal: Soft. Bowel sounds are normal. She exhibits distension. There is no tenderness. There is no rebound and no guarding.   Protuberant and large. Firm to touch due to edema in the wall.   Musculoskeletal: Normal range of motion. She exhibits edema.   +2 edema to bilateral legs to the level of the knees.   Neurological: She is alert and oriented to person, place, and time.   Skin: Skin is warm and dry. She is not diaphoretic.   Psychiatric: She has a normal mood and affect. Her behavior is normal.   Nursing note and vitals reviewed.      Procedures         ED Course  ED Course   Value Comment By Time   BNP: (!) 522.0 (Reviewed) Zachery Ferreira MD  [id number]     Dr. Ferreira spoke with Dr. Car, hospitalist, who recommends cardiology evaluation. Cardiology has been contacted and a cardiologist will evaluate the patient in the ED. Jose Patel 03/14 1431     Recent Results (from the past 24 hour(s))   Comprehensive Metabolic Panel    Collection Time: 03/14/18  1:37 PM   Result Value Ref Range    Glucose 98 70 - 100 mg/dL    BUN 12 9 - 23 mg/dL    Creatinine 0.80 0.60 - 1.30 mg/dL    Sodium 139 132 - 146 mmol/L    Potassium 3.7 3.5 - 5.5 mmol/L    Chloride 96 (L) 99 - 109 mmol/L    CO2 38.0 (H) 20.0 - 31.0 mmol/L    Calcium 8.8 8.7 - 10.4 mg/dL    Total Protein 7.4 5.7 - 8.2 g/dL    Albumin 3.70 3.20 - 4.80 g/dL    ALT (SGPT) 192 (H) 7 - 40 U/L    AST (SGOT) 41 (H) 0 - 33 U/L    Alkaline Phosphatase 106 (H) 25 - 100 U/L    Total Bilirubin 1.0 0.3 - 1.2 mg/dL    eGFR Non African Amer 74 >60 mL/min/1.73    Globulin 3.7 gm/dL    A/G Ratio 1.0 (L) 1.5 - 2.5 g/dL    BUN/Creatinine Ratio 15.0 7.0 - 25.0    Anion Gap 5.0 3.0 - 11.0 mmol/L   BNP    Collection Time: 03/14/18  1:37 PM   Result Value Ref Range    .0 (H) 0.0 - 100.0 pg/mL   Light Blue Top    Collection Time: 03/14/18  1:37 PM   Result Value Ref Range    Extra Tube hold for add-on    Green Top (Gel)    Collection Time: 03/14/18  1:37 PM   Result Value Ref Range    Extra Tube Hold for add-ons.    Lavender Top    Collection Time: 03/14/18  1:37 PM   Result Value Ref Range    Extra Tube hold for add-on    Gold Top - SST    Collection Time: 03/14/18  1:37 PM   Result Value Ref Range    Extra Tube Hold for add-ons.    CBC Auto Differential    Collection Time: 03/14/18  1:37 PM   Result Value Ref Range    WBC 12.19 (H) 3.50 - 10.80 10*3/mm3    RBC 4.47 3.89 - 5.14 10*6/mm3    Hemoglobin 13.8 11.5 - 15.5 g/dL    Hematocrit 45.1 (H) 34.5 - 44.0 %    .9 (H) 80.0 - 99.0 fL    MCH 30.9 27.0 - 31.0 pg    MCHC 30.6 (L) 32.0 - 36.0 g/dL    RDW 15.9 (H) 11.3 - 14.5 %    RDW-SD 57.5 (H) 37.0 - 54.0 fl    MPV  11.4 6.0 - 12.0 fL    Platelets 192 150 - 450 10*3/mm3    Neutrophil % 80.9 (H) 41.0 - 71.0 %    Lymphocyte % 8.6 (L) 24.0 - 44.0 %    Monocyte % 8.7 0.0 - 12.0 %    Eosinophil % 1.1 0.0 - 3.0 %    Basophil % 0.2 0.0 - 1.0 %    Immature Grans % 0.5 0.0 - 0.6 %    Neutrophils, Absolute 9.87 (H) 1.50 - 8.30 10*3/mm3    Lymphocytes, Absolute 1.05 0.60 - 4.80 10*3/mm3    Monocytes, Absolute 1.06 (H) 0.00 - 1.00 10*3/mm3    Eosinophils, Absolute 0.13 0.00 - 0.30 10*3/mm3    Basophils, Absolute 0.02 0.00 - 0.20 10*3/mm3    Immature Grans, Absolute 0.06 (H) 0.00 - 0.03 10*3/mm3   POC Troponin, Rapid    Collection Time: 03/14/18  1:41 PM   Result Value Ref Range    Troponin I 0.00 0.00 - 0.07 ng/mL   POC Troponin, Rapid    Collection Time: 03/14/18  3:58 PM   Result Value Ref Range    Troponin I 0.01 0.00 - 0.07 ng/mL     Note: In addition to lab results from this visit, the labs listed above may include labs taken at another facility or during a different encounter within the last 24 hours. Please correlate lab times with ED admission and discharge times for further clarification of the services performed during this visit.    XR Chest 1 View   Final Result   Compared to previous studies of 12/21/2017, the upper lobe   venous distention is slightly worse. The chest remains congested and the   cardiomegaly is stable. The patient has slight increase in the bibasilar   airspace opacities.       D:  03/14/2018   E:  03/14/2018       This report was finalized on 3/14/2018 2:57 PM by Dr. Valentín Puentes MD.            Vitals:    03/14/18 1415 03/14/18 1430 03/14/18 1445 03/14/18 1530   BP:  129/94  125/85   Pulse: 100 100 (!) 135 92   Resp:       Temp:       TempSrc:       SpO2: 94% 91% 92% 94%   Weight:       Height:         Medications   sodium chloride 0.9 % flush 10 mL (not administered)   furosemide (LASIX) injection 40 mg (not administered)     ECG/EMG Results (last 24 hours)     Procedure Component Value Units Date/Time     ECG 12 Lead [[id number]] Collected:  03/14/18 1254     Updated:  03/14/18 1301                    MDM  Number of Diagnoses or Management Options  Acute combined systolic and diastolic congestive heart failure: new and requires workup  Atypical atrial flutter: new and requires workup  Hypoxia: new and requires workup  Diagnosis management comments: Patient presents here with complaint of shortness of breath at the instruction of her primary care physician.    Patient was noted to be hypoxic with oxygen saturations are proxy 87% on 3 L nasal cannula.  She was increased to 5 L nasal cannula oxygen saturations improved to approximately 92-93%.    Patient had an abnormal rhythm recorded by EMS prior to arrival, however she denies any change in her symptoms at that time.  She presents here with tachycardia.    This appears to be an atrial tachycardia of some type, potentially atrial flutter.    The patient was evaluated by the cardiology service and will be admitted, they suggest initiation of IV Lasix for diuresis.  Patient's electrolytes are within normal range.       Amount and/or Complexity of Data Reviewed  Clinical lab tests: ordered and reviewed  Tests in the radiology section of CPT®: ordered and reviewed  Decide to obtain previous medical records or to obtain history from someone other than the patient: yes  Obtain history from someone other than the patient: yes  Review and summarize past medical records: yes  Discuss the patient with other providers: yes  Independent visualization of images, tracings, or specimens: yes    Patient Progress  Patient progress: stable      Final diagnoses:   Acute combined systolic and diastolic congestive heart failure   Hypoxia   Atypical atrial flutter       Documentation assistance provided by sherie Patel.  Information recorded by the sherie was done at my direction and has been verified and validated by me.     Jose Patel  03/14/18 [id number]       Jose  Amanda  03/14/18 1525       Zachery Ferreira MD  03/14/18 2671

## 2018-03-14 NOTE — PROGRESS NOTES
Discharge Planning Assessment  [facility]     Patient Name: Maria T Garcia  MRN: [id number]  Today's Date: 3/14/2018    Admit Date: 3/14/2018          Discharge Needs Assessment     Row Name 03/14/18 1137       Living Environment    Lives With child(andreia), adult   pt resides in [facility]    Name(s) of Who Lives With Patient Daughter- Sophia Garcia    Current Living Arrangements home/apartment/condo    Primary Care Provided by self    Provides Primary Care For no one    Family Caregiver if Needed child(andreia), adult    Able to Return to Prior Arrangements yes       Resource/Environmental Concerns    Resource/Environmental Concerns none       Transition Planning    Patient/Family Anticipates Transition to home    Patient/Family Anticipated Services at Transition home health care   Pt has [facility] home health that was initiated on 3/13/18.     Transportation Anticipated family or friend will provide       Discharge Needs Assessment    Readmission Within the Last 30 Days no previous admission in last 30 days    Concerns to be Addressed no discharge needs identified;denies needs/concerns at this time    Equipment Currently Used at Home walker, rolling;shower chair;bipap/cpap;oxygen   Pt has home oxygen provided by [employer]. Pt is waiting on CPAP machine to be delivered    Equipment Needed After Discharge none    Discharge Facility/Level of Care Needs home with home health   will need to resume  services at discharge            Discharge Plan     Row Name 03/14/18 5128       Plan    Plan home    Patient/Family in Agreement with Plan yes    Plan Comments CM spoke with pt and daughter Sophia at bedside. Pt plans to return home and denies dsicharge needs at this time. Pt will have assistance from her daughter as needed. Pt had [facility] health arranged and [facility] and would like to continue services at discharge. CM will continue to follow and assist with needs.         Destination     No  service coordination in this encounter.      Durable Medical Equipment     No service coordination in this encounter.      Dialysis/Infusion     No service coordination in this encounter.      Home Medical Care     No service coordination in this encounter.      Social Care     No service coordination in this encounter.                Demographic Summary     Row Name 03/14/18 1856       General Information    Referral Source admission list    General Information Comments PCP- Jessica Portillo       Contact Information    Permission Granted to Share Info With     Contact Information Comments [phone]            Functional Status     Row Name 03/14/18 1857       Functional Status    Usual Activity Tolerance fair    Current Activity Tolerance fair       Functional Status, IADL    Medications independent    Meal Preparation assistive person    Housekeeping assistive person    Laundry assistive person    Shopping assistive person       Employment/    Employment/ Comments Pt confirms she has Medicare, denies concerns or disruption in coverage. Pt reports she has prescription drug coverage and denies issues obtaining or affording current medications            Psychosocial    No documentation.           Abuse/Neglect    No documentation.           Legal    No documentation.           Substance Abuse    No documentation.           Patient Forms    No documentation.         Antionette Orozco

## 2018-03-15 ENCOUNTER — APPOINTMENT (OUTPATIENT)
Dept: GENERAL RADIOLOGY | Facility: HOSPITAL | Age: 58
End: 2018-03-15

## 2018-03-15 LAB
ANION GAP SERPL CALCULATED.3IONS-SCNC: 6 MMOL/L (ref 3–11)
BUN BLD-MCNC: 11 MG/DL (ref 9–23)
BUN/CREAT SERPL: 15.7 (ref 7–25)
CALCIUM SPEC-SCNC: 8.2 MG/DL (ref 8.7–10.4)
CHLORIDE SERPL-SCNC: 94 MMOL/L (ref 99–109)
CO2 SERPL-SCNC: 40 MMOL/L (ref 20–31)
CREAT BLD-MCNC: 0.7 MG/DL (ref 0.6–1.3)
GFR SERPL CREATININE-BSD FRML MDRD: 87 ML/MIN/1.73
GLUCOSE BLD-MCNC: 92 MG/DL (ref 70–100)
GLUCOSE BLDC GLUCOMTR-MCNC: 127 MG/DL (ref 70–130)
GLUCOSE BLDC GLUCOMTR-MCNC: 132 MG/DL (ref 70–130)
GLUCOSE BLDC GLUCOMTR-MCNC: 140 MG/DL (ref 70–130)
GLUCOSE BLDC GLUCOMTR-MCNC: 93 MG/DL (ref 70–130)
POTASSIUM BLD-SCNC: 3.4 MMOL/L (ref 3.5–5.5)
POTASSIUM BLD-SCNC: 3.9 MMOL/L (ref 3.5–5.5)
SODIUM BLD-SCNC: 140 MMOL/L (ref 132–146)

## 2018-03-15 PROCEDURE — 82962 GLUCOSE BLOOD TEST: CPT

## 2018-03-15 PROCEDURE — 84132 ASSAY OF SERUM POTASSIUM: CPT | Performed by: INTERNAL MEDICINE

## 2018-03-15 PROCEDURE — 99222 1ST HOSP IP/OBS MODERATE 55: CPT | Performed by: INTERNAL MEDICINE

## 2018-03-15 PROCEDURE — 71045 X-RAY EXAM CHEST 1 VIEW: CPT

## 2018-03-15 PROCEDURE — 99232 SBSQ HOSP IP/OBS MODERATE 35: CPT | Performed by: INTERNAL MEDICINE

## 2018-03-15 PROCEDURE — 80048 BASIC METABOLIC PNL TOTAL CA: CPT | Performed by: NURSE PRACTITIONER

## 2018-03-15 PROCEDURE — 25010000002 FUROSEMIDE PER 20 MG: Performed by: NURSE PRACTITIONER

## 2018-03-15 RX ORDER — BUDESONIDE AND FORMOTEROL FUMARATE DIHYDRATE 160; 4.5 UG/1; UG/1
2 AEROSOL RESPIRATORY (INHALATION) 2 TIMES DAILY PRN
Status: DISCONTINUED | OUTPATIENT
Start: 2018-03-15 | End: 2018-03-21 | Stop reason: HOSPADM

## 2018-03-15 RX ORDER — DOXYCYCLINE 100 MG/1
100 CAPSULE ORAL EVERY 12 HOURS SCHEDULED
Status: COMPLETED | OUTPATIENT
Start: 2018-03-15 | End: 2018-03-19

## 2018-03-15 RX ADMIN — METFORMIN HYDROCHLORIDE 500 MG: 500 TABLET, EXTENDED RELEASE ORAL at 17:05

## 2018-03-15 RX ADMIN — DOXYCYCLINE 100 MG: 100 CAPSULE ORAL at 20:44

## 2018-03-15 RX ADMIN — APIXABAN 5 MG: 5 TABLET, FILM COATED ORAL at 08:11

## 2018-03-15 RX ADMIN — POTASSIUM CHLORIDE 20 MEQ: 750 CAPSULE, EXTENDED RELEASE ORAL at 08:11

## 2018-03-15 RX ADMIN — METFORMIN HYDROCHLORIDE 500 MG: 500 TABLET, EXTENDED RELEASE ORAL at 08:11

## 2018-03-15 RX ADMIN — DOXYCYCLINE 100 MG: 100 CAPSULE ORAL at 10:13

## 2018-03-15 RX ADMIN — FUROSEMIDE 40 MG: 10 INJECTION, SOLUTION INTRAMUSCULAR; INTRAVENOUS at 10:13

## 2018-03-15 RX ADMIN — CETIRIZINE HYDROCHLORIDE 10 MG: 10 TABLET, FILM COATED ORAL at 08:11

## 2018-03-15 RX ADMIN — CITALOPRAM HYDROBROMIDE 40 MG: 20 TABLET ORAL at 08:11

## 2018-03-15 RX ADMIN — APIXABAN 5 MG: 5 TABLET, FILM COATED ORAL at 20:44

## 2018-03-15 RX ADMIN — SOTALOL HYDROCHLORIDE 40 MG: 80 TABLET ORAL at 10:22

## 2018-03-15 RX ADMIN — POTASSIUM CHLORIDE 40 MEQ: 750 CAPSULE, EXTENDED RELEASE ORAL at 13:07

## 2018-03-15 RX ADMIN — ASPIRIN 81 MG: 81 TABLET, COATED ORAL at 08:11

## 2018-03-15 RX ADMIN — POTASSIUM CHLORIDE 40 MEQ: 750 CAPSULE, EXTENDED RELEASE ORAL at 10:41

## 2018-03-15 RX ADMIN — FUROSEMIDE 40 MG: 10 INJECTION, SOLUTION INTRAMUSCULAR; INTRAVENOUS at 22:36

## 2018-03-15 RX ADMIN — PANTOPRAZOLE SODIUM 40 MG: 40 TABLET, DELAYED RELEASE ORAL at 06:32

## 2018-03-15 NOTE — PROGRESS NOTES
Continued Stay Note  [facility]     Patient Name: Maria T Garcia  MRN: [id number]  Today's Date: 3/15/2018    Admit Date: 3/14/2018          Discharge Plan     Row Name 03/15/18 1358       Plan    Plan Home with home health vs other    Plan Comments Confirmed with Elvia at [facility] in [facility] that patient is currently followed for skilled nursing, PT, and a home health aide for dxs of sepis, muscle weakness, CHF, COPD, A-fib, and h/o falls. Patient will need an order to resume home health at discharge. Reviewed patient's POC and it looks like she could be here for several days. If patient feels she could use inpatient skilled rehab after this hospital stay she will need PT and OT to evaluate her. CM will continue to follow.               Discharge Codes    No documentation.       Expected Discharge Date and Time     Expected Discharge Date Expected Discharge Time    Mar 17, 2018             Racquel Bolanos

## 2018-03-15 NOTE — PROGRESS NOTES
[facility] at [facility]    Inpatient Progress Note      Chief Complaint/Reason for service:    · Acute systolic heart failure         Subjective:       Patient is sitting up on the edge of the bed.  She states that she is feeling much better today. Dyspnea now mild, improved with diuresis, improved associated abdominal swelling and LE swelling.     Past medical, surgical, social and family history reviewed in the patient's electronic medical record.    Review of Systems:   Positive for dyspnea with exertion, lower extremity edema.  Negative for exertional chest pain, palpitations.    Problem List  Active Hospital Problems (** Indicates Principal Problem)    Diagnosis Date Noted   • Acute combined systolic and diastolic heart failure [I50.41] 03/14/2018   • Acute combined systolic and diastolic congestive heart failure [I50.41] 03/14/2018      Resolved Hospital Problems    Diagnosis Date Noted Date Resolved   No resolved problems to display.            Objective:      Current Facility-Administered Medications:   •  acetaminophen (TYLENOL) tablet 650 mg, 650 mg, Oral, Q4H PRN, Newberry P Jaciel, APRN  •  albuterol (PROVENTIL) nebulizer solution 0.083% 2.5 mg/3mL, 2.5 mg, Nebulization, Q4H PRN, Newberry P Jaciel, APRN  •  apixaban (ELIQUIS) tablet 5 mg, 5 mg, Oral, Q12H, Newberry P Jaciel, APRN, 5 mg at 03/14/18 2159  •  aspirin EC tablet 81 mg, 81 mg, Oral, Daily, Billie P Jaciel, APRN  •  budesonide-formoterol (SYMBICORT) 160-4.5 MCG/ACT inhaler 2 puff, 2 puff, Inhalation, BID - RT, Newberry P Jaciel, APRN  •  cetirizine (zyrTEC) tablet 10 mg, 10 mg, Oral, Daily, Billie P Jaciel, APRN  •  citalopram (CeleXA) tablet 40 mg, 40 mg, Oral, Daily, Billie P Jaciel, APRN  •  dextrose (D50W) solution 25 g, 25 g, Intravenous, Q15 Min PRN, Billie P Jaciel, APRN  •  dextrose (GLUTOSE) oral gel 15 g, 15 g, Oral, Q15 Min PRN, Billie P Jaciel, APRN  •  furosemide (LASIX) injection 40 mg, 40 mg, Intravenous, Q12H, Newberry P  Jaciel, APRN, Stopped at 03/14/18 2159  •  glucagon (human recombinant) (GLUCAGEN DIAGNOSTIC) injection 1 mg, 1 mg, Subcutaneous, PRN, Billie P Jaciel, APRN  •  insulin lispro (humaLOG) injection 0-7 Units, 0-7 Units, Subcutaneous, 4x Daily With Meals & Nightly, Burbank P Jaciel, APRN  •  metFORMIN ER (GLUCOPHAGE-XR) 24 hr tablet 500 mg, 500 mg, Oral, BID With Meals, Billie P Jaciel, APRN  •  pantoprazole (PROTONIX) EC tablet 40 mg, 40 mg, Oral, QAM, Burbank P Jaciel, APRN, 40 mg at 03/15/18 0632  •  potassium chloride (MICRO-K) CR capsule 40 mEq, 40 mEq, Oral, PRN **OR** potassium chloride (KLOR-CON) packet 40 mEq, 40 mEq, Oral, PRN **OR** potassium chloride 10 mEq in 100 mL IVPB, 10 mEq, Intravenous, Q1H PRN, Burbank P Jaciel, APRN  •  potassium chloride (MICRO-K) CR capsule 20 mEq, 20 mEq, Oral, Daily, Burbank P Jaciel, APRN  •  sodium chloride 0.9 % flush 1-10 mL, 1-10 mL, Intravenous, PRN, Burbank P Jaciel, APRN  •  sodium chloride 0.9 % flush 10 mL, 10 mL, Intravenous, PRN, Zachery Ferreira MD  •  sotalol (BETAPACE) tablet 40 mg, 40 mg, Oral, Q12H, Burbank P Jaciel, APRN, 40 mg at 03/14/18 2214      Vital Sign Min/Max for last 24 hours  Temp  Min: 97.9 °F (36.6 °C)  Max: 98.9 °F (37.2 °C)   BP  Min: 96/67  Max: 131/85   Pulse  Min: 86  Max: 135   Resp  Min: 18  Max: 26   SpO2  Min: 79 %  Max: 95 %   No Data Recorded    No intake or output data in the 24 hours ending 03/15/18 0809        CONSTITUTIONAL: No acute distress, normal affect  RESPIRATORY: Normal effort. Diminished with rales noted in the bases. Continuous O2 @ 3L.  CARDIOVASCULAR: Regular rate and irregular rhythm with normal S1 and S2. Without murmur, gallop or rub.  PERIPHERAL VASCULAR: Normal radial pulses bilaterally. There is 2+ peripheral edema bilaterally.    Results Review:   I reviewed the patient's recent labs in the electronic medical record.      Tele:  Atrial tachycardia 2:1        Assessment/Plan:     ASSESSMENT:  Assessment  -Presumed acute  systolic heart failure, weight gain of 27lbs over 2 weeks. Wt 276 lbs in the ED, now 267 lbs.  -Recent new diagnosis of atypical atrial flutter vs atrial tachycardia s/p MARY/ECV 12/2017 here at [facility], now with recurrence of this rhythm with 2:1 conduction  -Prior SVT s/p remote ablation, data deficit. Patient doesn't recall what hospital she had ablation done at  -Recent Pneumonia, ABx started at [facility], awaiting records  -RV dilitation with severe RV dysfunction  -COPD, currently on chronic O2, symbicort.   -Tobacco dependence  -Obesity    PLAN:  -Continue diuresis, Cr stable at 0.7. Supplement potassium  -Continue daily weights and strict I &O's  -Obtain records from [facility] recent admission.  -Continue doxycycline 100mg BID for recent PNA, diagnosed at [facility]  -Discussed with Dr Toscano/EP; will discontinue sotalol today. Plan to start Tikosyn on Saturday night with possible future repeat ECV. Continue NOAC. Possible future ablation after improved PNA, volume overload, and possible future ischemic evaluation.      LISSETTE Esparza obtained past medical, family history, social history, review of systems and functioned as a scribe for the remainder of the dictation for Dr. Couch.  3/15/2018    I, Koyb Couch MD, personally performed the services as scribed by the above named individual. I have made any necessary edits and it is both accurate and complete.     Koby Couch MD, MSc, [facility]  Interventional Cardiology  [facility]

## 2018-03-15 NOTE — CONSULTS
[facility] at [facility]  Cardiovascular Consultation      Maria T Garcia  [id number]  [id number]  1960    DATE OF ADMISSION: 3/14/2018  DATE OF CONSULTATION:  03/15/18    Jessica Portillo    Chief complaint/ Reason for Consultation: 2:1 Atrial tachycardia   Consult Requested by: Dr. Couch    History of Present Illness: Ms. Garcia is a 56 y/o female with the above noted past medical history. She presented to [facility] ED with complaints of a 30 lbs weight gain. She reports recently being admitted to [facility] and treated for PNA. She was discharged around 3/12. She believes that is when her diuretics were not continued. Since then she has become increasing short of breath which neccssicates increasing her home O2 to 3.5-4 liters. She was admitted and diuresed overnight. Her weight has improved and she reports a significant improvement in shortness of breath. Also of note, she was diagnosed with a PE about 2-3 months ago and started on Eliquis (previously on Xarelto). During this admission, she was noted to have atrial flutter vs. atrial tachycardia per Dr. Couch. EP/Cardiology consulted for further management. Decreased tp sotalol 40 mg BID on prior visit due to lower HRs and now with lower BPs with recurrence of Linnea Agarwal.     She reports a h/o SVT for which she had an ablation in the past but unsure if where this was done (previously noted to be [facility]). Upon further questioning, she is not sure if this was for SVT. Review of chart provides the following information: She presented to ED December 21, 2017 with SVT vs. Atrial flutter. She was given two doses of adenosine without conversion. She was started on Cardizem gtt for rate control and underwent cardioversion on 12/22 and started on Sotalol and Xarelto. She is anticoagulated with Eliquis which was recently switched from Xarelto after being found to have a PE.     She denies chest pain, LH, dizziness, near syncope, syncope and  palpitations.    Past Medical History:   Diagnosis Date   • CHF (congestive heart failure)    • COPD (chronic obstructive pulmonary disease)    • Diabetes mellitus    • Hypertension    • Neuropathy        Past Surgical History:   Procedure Laterality Date   • BREAST SURGERY     •  SECTION     • DENTAL PROCEDURE         No current facility-administered medications on file prior to encounter.      Current Outpatient Prescriptions on File Prior to Encounter   Medication Sig Dispense Refill   • aspirin 81 MG EC tablet Take 1 tablet by mouth Daily. 30 tablet 5   • cetirizine (zyrTEC) 10 MG tablet Take 10 mg by mouth Daily As Needed.     • hydrOXYzine (VISTARIL) 50 MG capsule Take 50 mg by mouth Every Morning.     • omeprazole (priLOSEC) 40 MG capsule Take 40 mg by mouth Daily.     • sotalol (BETAPACE) 80 MG tablet Take 0.5 tablets by mouth Every 12 (Twelve) Hours. 30 tablet 5       Social History     Social History   • Marital status: Single     Spouse name: N/A   • Number of children: N/A   • Years of education: N/A     Occupational History   • Not on file.     Social History Main Topics   • Smoking status: Current Every Day Smoker     Packs/day: 1.00     Types: Cigarettes   • Smokeless tobacco: Never Used   • Alcohol use No   • Drug use: No   • Sexual activity: Defer     Other Topics Concern   • Not on file     Social History Narrative   • No narrative on file     History reviewed. No pertinent family history.    REVIEW OF SYSTEMS:   14 point ROS was performed and is Negative except as outlined in HPI     Objective:     Vitals:    18 2300 18 2320 03/15/18 0410 03/15/18 0737   BP:  101/74 113/87 96/67   BP Location:  Left arm Left arm Right arm   Patient Position:  Lying Lying Lying   Pulse: 97 102 120 103   Resp:  20 20 18   Temp:  98.9 °F (37.2 °C) 98.7 °F (37.1 °C) 97.9 °F (36.6 °C)   TempSrc:  Oral Oral Oral   SpO2:  92% 94% (!) 79%   Weight:       Height:         Body mass index is 48.87  "kg/m².  Flowsheet Rows    Flowsheet Row First Filed Value   Admission Height 157.5 cm (62\") Documented at 03/14/2018 1248   Admission Weight 125 kg (276 lb) Documented at 03/14/2018 1248        No intake or output data in the 24 hours ending 03/15/18 1030    Physical Exam   Constitutional:  well-developed and well-nourished. No distress.   HENT: Normocephalic.   Eyes: Conjunctivae are normal. No scleral icterus.   Neck: Normal carotid pulses, no hepatojugular reflux and no JVD present. Carotid bruit is not present.   Cardiovascular: Tachycardic, S1 normal, S2 normal, no rubs murmurs or gallops.   Pulses:       Radial pulses are 2+ on the right side, and 2+ on the left side.       Dorsalis pedis pulses are 2+ on the right side, and 2+ on the left side.   Pulmonary/Chest: Non labored. Crackles throughout. No wheezes,  Rhonchi  Decreased sounds.   Abdominal: Soft. Bowel sounds are normal. Obese  Musculoskeletal:  exhibits 2+ edema bilaterally, no tenderness or deformity.   Neurological: is alert and oriented to person, place, and time.   Skin: Skin is warm and dry. No rash noted. Non diaphoretic. No cyanosis or erythema. No pallor. Nails show no clubbing.   Psychiatric: Normal mood and affect. Speech is normal and behavior is normal.    Lab Review:                  Results from last 7 days  Lab Units 03/15/18  0704   SODIUM mmol/L 140   POTASSIUM mmol/L 3.4*   CHLORIDE mmol/L 94*   CO2 mmol/L 40.0*   BUN mg/dL 11   CREATININE mg/dL 0.70   GLUCOSE mg/dL 92   CALCIUM mg/dL 8.2*           Results from last 7 days  Lab Units 03/14/18  1337   WBC 10*3/mm3 12.19*   HEMOGLOBIN g/dL 13.8   HEMATOCRIT % 45.1*   PLATELETS 10*3/mm3 192                       TTE 12/21/17  · Left ventricular systolic function is low normal. .   · Estimated EF appears to be in the range of 46 - 50%.  · Right ventricular cavity is moderately dilated.  · Severely reduced right ventricular systolic function noted.  · Right atrial cavity size is " moderately dilated.  · Mild tricuspid valve regurgitation is present.  · Calculated right ventricular systolic pressure from tricuspid regurgitation is 31 mmHg.           CXR 3/14/18: Cardiomegaly is noted with perihilar vascular prominence.  Bibasilar airspace opacities are noted with volume reduction and perhaps  small bibasilar subpulmonic effusions. There is cephalization of the  upper lobe veins.       I personally viewed and interpreted the patient's EKG/Telemetry data     Assessment/Plan:   1) Acute on chronic systolic heart failure superimposed diastolic heart failure   - EF 46-50%   - Weight gain of 27 lbs over 2 weeks; weight in , now 267 lbs  - No I&O data  -   Plan:  - Diureses per Dr. Couch with IV Lasix, still volume overloaded today.   - I think in the near future she would benefit from a ischemia eval    2) Atrial flutter (atypical) / Atrial tachycardia 2: 1 with CL of about 290-300 msec.   - Remote h/o SVT with ablation- data deficit  - s/p MARY/ECV December 2017  - Now with recurrence with 2:1 conduction on a low-dose sotalol 40 mg twice a day due to lower heart rates and now inability increase due to lower blood pressures.    Plan:  - D/C sotalol today since can increase any further and recurrence of atrial arrhythmias.  - Will plan to start Tikosyn on Saturday PM after Sotalol has time to wash out and if QTc is ok. Supplement K and if ok would start on Tikosyn 500 mcg BID on Saturday AM.   - If still in atrial flutter/atrial tachycardia then would proceed with external cardioversion early next week.  - Continue Eliquis  - Consider PVA in future after acute issues (PNA, acute systolic heart failure) have resolved however is higher risk due to severe RV dysfunction, COPD on home O2 as well as other comorbidities.  If recurrence of atrial arrhythmias even consideration of a pacemaker AV node ablation may need to be thought about with the patient's other comorbidities as mentioned.  -  Still needs further diuresis per Dr Couch  - I will come back to see patient on Monday since not on any AAD and wont start till Saturday.     3) Recent PNA  - Awaiting records from [place]  - Treated with Abx    4) COPD  - Home O2 of 3 L  - Continue inhalers    5) Hypokalemia --> Supplement.       Thank you for allowing me to participate in the care of Maria T Garcia. Feel free to contact me directly with any further questions or concerns      Scribed for Sony Toscano DO by ISMA Renee, APRN. 3/15/2018  10:46 AM     I, Sony Toscano,  personally performed the services described in this documentation as scribed by the above named individual in my presence, and it is both accurate and complete.  3/15/2018  3:19 PM    Sony Toscano DO  3:19 PM  03/15/18                EMR Dragon/Transcription disclaimer:  Much of this encounter note is an electronic transcription/translation of spoken language to printed text. Electronic translation of spoken language may permit erroneous, or at times, nonsensical words or phrases to be inadvertently transcribed. Although I have reviewed the note for such errors, some may still exist.

## 2018-03-16 LAB
ANION GAP SERPL CALCULATED.3IONS-SCNC: 9 MMOL/L (ref 3–11)
BUN BLD-MCNC: 11 MG/DL (ref 9–23)
BUN/CREAT SERPL: 15.7 (ref 7–25)
CALCIUM SPEC-SCNC: 8.3 MG/DL (ref 8.7–10.4)
CHLORIDE SERPL-SCNC: 89 MMOL/L (ref 99–109)
CO2 SERPL-SCNC: 39 MMOL/L (ref 20–31)
CREAT BLD-MCNC: 0.7 MG/DL (ref 0.6–1.3)
GFR SERPL CREATININE-BSD FRML MDRD: 87 ML/MIN/1.73
GLUCOSE BLD-MCNC: 107 MG/DL (ref 70–100)
GLUCOSE BLDC GLUCOMTR-MCNC: 109 MG/DL (ref 70–130)
GLUCOSE BLDC GLUCOMTR-MCNC: 116 MG/DL (ref 70–130)
GLUCOSE BLDC GLUCOMTR-MCNC: 125 MG/DL (ref 70–130)
GLUCOSE BLDC GLUCOMTR-MCNC: 133 MG/DL (ref 70–130)
MAGNESIUM SERPL-MCNC: 1.5 MG/DL (ref 1.3–2.7)
POTASSIUM BLD-SCNC: 3.6 MMOL/L (ref 3.5–5.5)
SODIUM BLD-SCNC: 137 MMOL/L (ref 132–146)

## 2018-03-16 PROCEDURE — 82962 GLUCOSE BLOOD TEST: CPT

## 2018-03-16 PROCEDURE — 83735 ASSAY OF MAGNESIUM: CPT

## 2018-03-16 PROCEDURE — 25010000002 FUROSEMIDE PER 20 MG: Performed by: NURSE PRACTITIONER

## 2018-03-16 PROCEDURE — 25010000002 MAGNESIUM SULFATE 2 GM/50ML SOLUTION

## 2018-03-16 PROCEDURE — 99232 SBSQ HOSP IP/OBS MODERATE 35: CPT | Performed by: INTERNAL MEDICINE

## 2018-03-16 PROCEDURE — 80048 BASIC METABOLIC PNL TOTAL CA: CPT | Performed by: NURSE PRACTITIONER

## 2018-03-16 RX ORDER — MAGNESIUM SULFATE HEPTAHYDRATE 40 MG/ML
2 INJECTION, SOLUTION INTRAVENOUS AS NEEDED
Status: DISCONTINUED | OUTPATIENT
Start: 2018-03-16 | End: 2018-03-21 | Stop reason: HOSPADM

## 2018-03-16 RX ORDER — MAGNESIUM SULFATE HEPTAHYDRATE 40 MG/ML
4 INJECTION, SOLUTION INTRAVENOUS AS NEEDED
Status: DISCONTINUED | OUTPATIENT
Start: 2018-03-16 | End: 2018-03-21 | Stop reason: HOSPADM

## 2018-03-16 RX ADMIN — CETIRIZINE HYDROCHLORIDE 10 MG: 10 TABLET, FILM COATED ORAL at 08:10

## 2018-03-16 RX ADMIN — METFORMIN HYDROCHLORIDE 500 MG: 500 TABLET, EXTENDED RELEASE ORAL at 08:09

## 2018-03-16 RX ADMIN — ASPIRIN 81 MG: 81 TABLET, COATED ORAL at 08:10

## 2018-03-16 RX ADMIN — METOPROLOL TARTRATE 12.5 MG: 25 TABLET, FILM COATED ORAL at 05:14

## 2018-03-16 RX ADMIN — MAGNESIUM SULFATE IN WATER 4 G: 40 INJECTION, SOLUTION INTRAVENOUS at 12:41

## 2018-03-16 RX ADMIN — PANTOPRAZOLE SODIUM 40 MG: 40 TABLET, DELAYED RELEASE ORAL at 06:56

## 2018-03-16 RX ADMIN — FUROSEMIDE 40 MG: 10 INJECTION, SOLUTION INTRAMUSCULAR; INTRAVENOUS at 09:24

## 2018-03-16 RX ADMIN — APIXABAN 5 MG: 5 TABLET, FILM COATED ORAL at 22:40

## 2018-03-16 RX ADMIN — DOXYCYCLINE 100 MG: 100 CAPSULE ORAL at 08:09

## 2018-03-16 RX ADMIN — MAGNESIUM SULFATE HEPTAHYDRATE 2 G: 40 INJECTION, SOLUTION INTRAVENOUS at 14:21

## 2018-03-16 RX ADMIN — MAGNESIUM SULFATE HEPTAHYDRATE 2 G: 40 INJECTION, SOLUTION INTRAVENOUS at 12:14

## 2018-03-16 RX ADMIN — CITALOPRAM HYDROBROMIDE 40 MG: 20 TABLET ORAL at 08:10

## 2018-03-16 RX ADMIN — POTASSIUM CHLORIDE 20 MEQ: 750 CAPSULE, EXTENDED RELEASE ORAL at 08:10

## 2018-03-16 RX ADMIN — DOXYCYCLINE 100 MG: 100 CAPSULE ORAL at 22:40

## 2018-03-16 RX ADMIN — METFORMIN HYDROCHLORIDE 500 MG: 500 TABLET, EXTENDED RELEASE ORAL at 17:04

## 2018-03-16 RX ADMIN — FUROSEMIDE 40 MG: 10 INJECTION, SOLUTION INTRAMUSCULAR; INTRAVENOUS at 22:40

## 2018-03-16 RX ADMIN — APIXABAN 5 MG: 5 TABLET, FILM COATED ORAL at 08:09

## 2018-03-16 NOTE — PROGRESS NOTES
Continued Stay Note   [facility]     Patient Name: Maria T Garcia  MRN: [id number]  Today's Date: 3/16/2018    Admit Date: 3/14/2018          Discharge Plan     Row Name 03/16/18 1349       Plan    Plan Home with home health    Patient/Family in Agreement with Plan yes    Plan Comments Met with patient in the room to discuss possible discharge plan. Explained that patient may be started on Tikosyn this weekend and inquired about patient's insurance coverage. Patient expressed concern about being able to afford Tikosyn. CM printed an application for [employer]'s assistance program in case patient needs it. Spoke with to Kadeem at patient's pharmacy, [facility], [phone]. He states they do not have Tikosyn in stock but can order it in one business day. He confirms that patient has Medicare D and states he will run a dummy script, even if it is not the same dose at discharge, to get an idea of her copay cost. Please leave a hard script for Tikosyn/dofetilide on the chart if plan is for patient to discharge on it. CM will get the copay cost on Monday, 3/19/18, and will continue to follow.               Discharge Codes    No documentation.       Expected Discharge Date and Time     Expected Discharge Date Expected Discharge Time    Mar 20, 2018             Racquel Bolanos

## 2018-03-16 NOTE — PROGRESS NOTES
[facility] at [facility]    Inpatient Progress Note      Chief Complaint/Reason for service:    · Acute systolic heart failure  · Atrial tachycardia         Subjective:       Still feels about the same. SOB improving a little. Still with significant lower extremity edema. When she gets up, her heart races and makes her feel more SOB.     Past medical, surgical, social and family history reviewed in the patient's electronic medical record.    Review of Systems:   Positive for dyspnea with exertion, lower extremity edema.  Negative for exertional chest pain, palpitations.    Problem List  Active Hospital Problems (** Indicates Principal Problem)    Diagnosis Date Noted   • Acute combined systolic and diastolic heart failure [I50.41] 03/14/2018   • Acute combined systolic and diastolic congestive heart failure [I50.41] 03/14/2018      Resolved Hospital Problems    Diagnosis Date Noted Date Resolved   No resolved problems to display.            Objective:      Current Facility-Administered Medications:   •  acetaminophen (TYLENOL) tablet 650 mg, 650 mg, Oral, Q4H PRN, McLean P Jaciel, APRN  •  albuterol (PROVENTIL) nebulizer solution 0.083% 2.5 mg/3mL, 2.5 mg, Nebulization, Q4H PRN, McLean P Jaciel, APRN  •  apixaban (ELIQUIS) tablet 5 mg, 5 mg, Oral, Q12H, Billie P Jaciel, APRN, 5 mg at 03/16/18 0809  •  aspirin EC tablet 81 mg, 81 mg, Oral, Daily, McLean P Jaciel, APRN, 81 mg at 03/16/18 0810  •  budesonide-formoterol (SYMBICORT) 160-4.5 MCG/ACT inhaler 2 puff, 2 puff, Inhalation, BID PRN, Koby Couch MD  •  cetirizine (zyrTEC) tablet 10 mg, 10 mg, Oral, Daily, Billie P Jaciel, APRN, 10 mg at 03/16/18 0810  •  citalopram (CeleXA) tablet 40 mg, 40 mg, Oral, Daily, McLean P Jaciel, APRN, 40 mg at 03/16/18 0810  •  dextrose (D50W) solution 25 g, 25 g, Intravenous, Q15 Min PRN, McLean P Jaciel, APRN  •  dextrose (GLUTOSE) oral gel 15 g, 15 g, Oral, Q15 Min PRN, Billie Grissom, APRN  •  doxycycline  (MONODOX) capsule 100 mg, 100 mg, Oral, Q12H, Koby Couch MD, 100 mg at 03/16/18 0809  •  furosemide (LASIX) injection 40 mg, 40 mg, Intravenous, Q12H, Billie P Jaciel, APRN, 40 mg at 03/16/18 0924  •  glucagon (human recombinant) (GLUCAGEN DIAGNOSTIC) injection 1 mg, 1 mg, Subcutaneous, PRN, Stanly P Jaciel, APRN  •  insulin lispro (humaLOG) injection 0-7 Units, 0-7 Units, Subcutaneous, 4x Daily With Meals & Nightly, Billie P Jaciel, APRN  •  metFORMIN ER (GLUCOPHAGE-XR) 24 hr tablet 500 mg, 500 mg, Oral, BID With Meals, Stanly P Jaciel, APRN, 500 mg at 03/16/18 0809  •  pantoprazole (PROTONIX) EC tablet 40 mg, 40 mg, Oral, QAM, Billie P Jaciel, APRN, 40 mg at 03/16/18 0656  •  Pharmacy Consult - [facility], , Does not apply, Daily, Tanya Saha, [facility]  •  potassium chloride (MICRO-K) CR capsule 40 mEq, 40 mEq, Oral, PRN, 40 mEq at 03/15/18 1307 **OR** potassium chloride (KLOR-CON) packet 40 mEq, 40 mEq, Oral, PRN **OR** potassium chloride 10 mEq in 100 mL IVPB, 10 mEq, Intravenous, Q1H PRN, Stanly P Jaciel, APRN  •  potassium chloride (MICRO-K) CR capsule 20 mEq, 20 mEq, Oral, Daily, Stanly P Jaciel, APRN, 20 mEq at 03/16/18 0810  •  sodium chloride 0.9 % flush 1-10 mL, 1-10 mL, Intravenous, PRN, Stanly P Jaciel, APRN  •  sodium chloride 0.9 % flush 10 mL, 10 mL, Intravenous, PRN, Zachery Ferreira MD      Vital Sign Min/Max for last 24 hours  Temp  Min: 97.8 °F (36.6 °C)  Max: 99.1 °F (37.3 °C)   BP  Min: 88/65  Max: 120/83   Pulse  Min: 92  Max: 136   Resp  Min: 16  Max: 18   SpO2  Min: 87 %  Max: 95 %   No Data Recorded      Intake/Output Summary (Last 24 hours) at 03/16/18 1105  Last data filed at 03/16/18 0500   Gross per 24 hour   Intake              720 ml   Output             4850 ml   Net            -4130 ml           CONSTITUTIONAL: No acute distress, normal affect  RESPIRATORY: Normal effort. Diminished BS . Continuous O2 @ 3L.  CARDIOVASCULAR: Regular rate and irregular rhythm with normal S1 and S2. Without murmur,  gallop or rub.  PERIPHERAL VASCULAR: Normal radial pulses bilaterally. There is 2+ peripheral edema bilaterally in lower extremities.     Results Review:   I reviewed the patient's recent labs in the electronic medical record.      Tele:  Atrial tachycardia 2:1        Assessment/Plan:     ASSESSMENT:  Assessment  -Presumed acute systolic heart failure, weight gain of 27lbs over 2 weeks. Wt 276 lbs in the ED, now 267 lbs.  -Recent new diagnosis of atypical atrial flutter vs atrial tachycardia s/p MARY/ECV 12/2017 here at [facility], now with recurrence of this rhythm with 2:1 conduction  -Prior SVT s/p remote ablation, data deficit. Patient doesn't recall what hospital she had ablation done at  -Recent Pneumonia, ABx started at [facility], records from [facility] state she had sepsis which resolved, had abnormal CT of chest with lymphadenopathy with recommendation for outpatient follow up in 3 months  -RV dilitation with severe RV dysfunction  -COPD, currently on chronic O2, symbicort.   -Tobacco dependence  -Obesity    PLAN:  -Continue diuresis, good output,  Cr still stable at 0.7. Supplement potassium, Potassium is normal today.   -Continue daily weights and strict I &O's  -Obtain records from [facility] recent admission. - still awaiting records  -Continue doxycycline 100mg BID for recent PNA, diagnosed at [facility]  -Dr Toscano has seen and sotalol was stopped yesterday, plan is to start Tikoysn tomorrow (500 mcg BID) and +/- ECV early next week  - Daily EKGs  - continue [facility]  3/16/2018   11:06 AM

## 2018-03-16 NOTE — PROGRESS NOTES
[facility] at [facility]    Inpatient Progress Note      Chief Complaint/Reason for service:    · Acute systolic heart failure         Subjective:       Reports that shortness of breath is improving.  Adequate response to diuretics.  Telemetry reveals atrial fibrillation.    Past medical, surgical, social and family history reviewed in the patient's electronic medical record.    Review of Systems:   Positive for dyspnea with exertion, lower extremity edema.  Negative for exertional chest pain, palpitations.    Problem List  Active Hospital Problems (** Indicates Principal Problem)    Diagnosis Date Noted   • Acute combined systolic and diastolic heart failure [I50.41] 03/14/2018   • Acute combined systolic and diastolic congestive heart failure [I50.41] 03/14/2018      Resolved Hospital Problems    Diagnosis Date Noted Date Resolved   No resolved problems to display.            Objective:      Current Facility-Administered Medications:   •  acetaminophen (TYLENOL) tablet 650 mg, 650 mg, Oral, Q4H PRN, [place] P Jaciel, APRN  •  albuterol (PROVENTIL) nebulizer solution 0.083% 2.5 mg/3mL, 2.5 mg, Nebulization, Q4H PRN, [place] P Jaciel, APRN  •  apixaban (ELIQUIS) tablet 5 mg, 5 mg, Oral, Q12H, Billie P Jaciel, APRN, 5 mg at 03/16/18 0809  •  aspirin EC tablet 81 mg, 81 mg, Oral, Daily, [place] P Jaciel, APRN, 81 mg at 03/16/18 0810  •  budesonide-formoterol (SYMBICORT) 160-4.5 MCG/ACT inhaler 2 puff, 2 puff, Inhalation, BID PRN, Koby Couch MD  •  cetirizine (zyrTEC) tablet 10 mg, 10 mg, Oral, Daily, [place] P Jacile, APRN, 10 mg at 03/16/18 0810  •  citalopram (CeleXA) tablet 40 mg, 40 mg, Oral, Daily, Billie P Jaciel, APRN, 40 mg at 03/16/18 0810  •  dextrose (D50W) solution 25 g, 25 g, Intravenous, Q15 Min PRN, Billie P Jaciel, APRN  •  dextrose (GLUTOSE) oral gel 15 g, 15 g, Oral, Q15 Min PRN, [place] P Jaciel, APRN  •  doxycycline (MONODOX) capsule 100 mg, 100 mg, Oral, Q12H, Koby Couch MD, 100  mg at 03/16/18 0809  •  furosemide (LASIX) injection 40 mg, 40 mg, Intravenous, Q12H, Billie P Jaceil, APRN, 40 mg at 03/16/18 0924  •  glucagon (human recombinant) (GLUCAGEN DIAGNOSTIC) injection 1 mg, 1 mg, Subcutaneous, PRN, Pen[place] P Jaciel, APRN  •  insulin lispro (humaLOG) injection 0-7 Units, 0-7 Units, Subcutaneous, 4x Daily With Meals & Nightly, Pennington P Jaciel, APRN  •  Magnesium Sulfate 2 gram Bolus, followed by 8 gram infusion (total Mg dose 10 grams)- Mg less than or equal to 1mg/dL, 2 g, Intravenous, PRN, 2 g at 03/16/18 1214 **OR** Magnesium Sulfate 6 gram Infusion (2 gm x 3) -Mg 1.1 -1.5 mg/dL, 2 g, Intravenous, PRN **OR** magnesium sulfate 4 gram infusion- Mg 1.6-1.9 mg/dL, 4 g, Intravenous, PRN, Mckayla Pinon [facility], Last Rate: 25 mL/hr at 03/16/18 1241, 4 g at 03/16/18 1241  •  metFORMIN ER (GLUCOPHAGE-XR) 24 hr tablet 500 mg, 500 mg, Oral, BID With Meals, Penning[place] P Jaciel, APRN, 500 mg at 03/16/18 0809  •  pantoprazole (PROTONIX) EC tablet 40 mg, 40 mg, Oral, QAM, Pennington P Jaciel, APRN, 40 mg at 03/16/18 0656  •  Pharmacy Consult - [facility], , Does not apply, Daily, Tanya Saha [facility]  •  [START ON 3/17/2018] Pharmacy Consult, , Does not apply, Continuous PRN, BAILEY Alan  •  potassium chloride (MICRO-K) CR capsule 40 mEq, 40 mEq, Oral, PRN, 40 mEq at 03/15/18 1307 **OR** potassium chloride (KLOR-CON) packet 40 mEq, 40 mEq, Oral, PRN **OR** potassium chloride 10 mEq in 100 mL IVPB, 10 mEq, Intravenous, Q1H PRN, Billie P Jaciel, APRN  •  potassium chloride (MICRO-K) CR capsule 20 mEq, 20 mEq, Oral, Daily, Penning[place] P Jaciel, APRN, 20 mEq at 03/16/18 0810  •  sodium chloride 0.9 % flush 1-10 mL, 1-10 mL, Intravenous, PRN, Pennington P Jaciel, APRN  •  sodium chloride 0.9 % flush 10 mL, 10 mL, Intravenous, PRN, Zachery Ferreira MD      Vital Sign Min/Max for last 24 hours  Temp  Min: 97.7 °F (36.5 °C)  Max: 99.1 °F (37.3 °C)   BP  Min: 88/65  Max: 118/77   Pulse  Min: 92  Max: 136   Resp  Min: 16  Max: 18    SpO2  Min: 87 %  Max: 95 %   No Data Recorded      Intake/Output Summary (Last 24 hours) at 03/16/18 1246  Last data filed at 03/16/18 1120   Gross per 24 hour   Intake              720 ml   Output             4750 ml   Net            -4030 ml           CONSTITUTIONAL: No acute distress, normal affect  RESPIRATORY: Normal effort. Diminished with rales noted in the bases. Continuous O2 @ 3L.  CARDIOVASCULAR:Irregularly irregular, tachycardia, no murmurs rubs or gallops.    PERIPHERAL VASCULAR: Normal radial pulses bilaterally. There is 2+ peripheral edema bilaterally.    Results Review:   I reviewed the patient's recent labs in the electronic medical record.      Tele:  Atrial tachycardia 2:1        Assessment/Plan:     ASSESSMENT:  Assessment  -Presumed acute systolic heart failure, weight gain of 27lbs over 2 weeks. Wt 276 lbs in the ED, now 267 lbs.  -Recent new diagnosis of atypical atrial flutter vs atrial tachycardia s/p MARY/ECV 12/2017 here at [facility], now with recurrence of this rhythm with 2:1 conduction  -Prior SVT s/p remote ablation, data deficit. Patient doesn't recall what hospital she had ablation done at  -Recent Pneumonia, ABx started at [facility], awaiting records  -RV dilitation with severe RV dysfunction  -COPD, currently on chronic O2, symbicort.   -Tobacco dependence  -Obesity    PLAN:  -Continue diuresis, trending renal function and electrolytes  -Continue daily weights and strict I &O's  -Obtain records from [facility] recent admission.  -Continue doxycycline 100mg BID for recent PNA, diagnosed at [facility]  -Discussed with Dr Toscano/EP; sotalol discontinued yesterday. Plan to start Tikosyn tomorrow night with possible future repeat ECV. Continue NOAC. Possible future ablation after improved PNA, volume overload, and possible future ischemic evaluation.

## 2018-03-16 NOTE — PLAN OF CARE
Problem: Fall Risk (Adult)  Goal: Absence of Fall  Outcome: Ongoing (interventions implemented as appropriate)      Problem: Patient Care Overview  Goal: Plan of Care Review  Outcome: Ongoing (interventions implemented as appropriate)   03/16/18 0418   Coping/Psychosocial   Plan of Care Reviewed With patient   Plan of Care Review   Progress no change   OTHER   Outcome Summary VSS, Sinus tach/A-fib on monitor with PAC and PVC's occasionally, on 3L NC. No complaints noted. Pt has been tachycardiac throughout shift; no chest pain or SOB noted. Ranging between 120-130's. Plan per note is to start on tikosyn saturday. Will continue to monitor. Pt has had 2700 ml urine output this shift. 4850 ml urine output in 24 hr total.         Problem: Cardiac: Heart Failure (Adult)  Goal: Signs and Symptoms of Listed Potential Problems Will be Absent, Minimized or Managed (Cardiac: Heart Failure)  Outcome: Ongoing (interventions implemented as appropriate)      Problem: Fluid Volume Excess (Adult)  Goal: Identify Related Risk Factors and Signs and Symptoms  Outcome: Ongoing (interventions implemented as appropriate)    Goal: Optimal Fluid Balance  Outcome: Ongoing (interventions implemented as appropriate)

## 2018-03-17 LAB
ANION GAP SERPL CALCULATED.3IONS-SCNC: 6 MMOL/L (ref 3–11)
ANION GAP SERPL CALCULATED.3IONS-SCNC: 9 MMOL/L (ref 3–11)
BUN BLD-MCNC: 11 MG/DL (ref 9–23)
BUN BLD-MCNC: 11 MG/DL (ref 9–23)
BUN/CREAT SERPL: 13.8 (ref 7–25)
BUN/CREAT SERPL: 15.7 (ref 7–25)
CA-I SERPL ISE-MCNC: 1.17 MMOL/L (ref 1.12–1.32)
CALCIUM SPEC-SCNC: 8.7 MG/DL (ref 8.7–10.4)
CALCIUM SPEC-SCNC: 8.8 MG/DL (ref 8.7–10.4)
CHLORIDE SERPL-SCNC: 89 MMOL/L (ref 99–109)
CHLORIDE SERPL-SCNC: 90 MMOL/L (ref 99–109)
CO2 SERPL-SCNC: 39 MMOL/L (ref 20–31)
CO2 SERPL-SCNC: 39 MMOL/L (ref 20–31)
CREAT BLD-MCNC: 0.7 MG/DL (ref 0.6–1.3)
CREAT BLD-MCNC: 0.8 MG/DL (ref 0.6–1.3)
GFR SERPL CREATININE-BSD FRML MDRD: 74 ML/MIN/1.73
GFR SERPL CREATININE-BSD FRML MDRD: 87 ML/MIN/1.73
GLUCOSE BLD-MCNC: 111 MG/DL (ref 70–100)
GLUCOSE BLD-MCNC: 144 MG/DL (ref 70–100)
GLUCOSE BLDC GLUCOMTR-MCNC: 138 MG/DL (ref 70–130)
GLUCOSE BLDC GLUCOMTR-MCNC: 149 MG/DL (ref 70–130)
MAGNESIUM SERPL-MCNC: 2.4 MG/DL (ref 1.3–2.7)
MAGNESIUM SERPL-MCNC: 2.5 MG/DL (ref 1.3–2.7)
POTASSIUM BLD-SCNC: 3.7 MMOL/L (ref 3.5–5.5)
POTASSIUM BLD-SCNC: 3.7 MMOL/L (ref 3.5–5.5)
SODIUM BLD-SCNC: 135 MMOL/L (ref 132–146)
SODIUM BLD-SCNC: 137 MMOL/L (ref 132–146)

## 2018-03-17 PROCEDURE — 25010000002 FUROSEMIDE PER 20 MG: Performed by: NURSE PRACTITIONER

## 2018-03-17 PROCEDURE — 80048 BASIC METABOLIC PNL TOTAL CA: CPT | Performed by: NURSE PRACTITIONER

## 2018-03-17 PROCEDURE — 80048 BASIC METABOLIC PNL TOTAL CA: CPT | Performed by: PHYSICIAN ASSISTANT

## 2018-03-17 PROCEDURE — 93005 ELECTROCARDIOGRAM TRACING: CPT | Performed by: PHYSICIAN ASSISTANT

## 2018-03-17 PROCEDURE — 83735 ASSAY OF MAGNESIUM: CPT | Performed by: PHYSICIAN ASSISTANT

## 2018-03-17 PROCEDURE — 82962 GLUCOSE BLOOD TEST: CPT

## 2018-03-17 PROCEDURE — 93010 ELECTROCARDIOGRAM REPORT: CPT | Performed by: INTERNAL MEDICINE

## 2018-03-17 PROCEDURE — 93005 ELECTROCARDIOGRAM TRACING: CPT | Performed by: INTERNAL MEDICINE

## 2018-03-17 PROCEDURE — 82330 ASSAY OF CALCIUM: CPT | Performed by: PHYSICIAN ASSISTANT

## 2018-03-17 RX ORDER — DIGOXIN 0.25 MG/ML
250 INJECTION INTRAMUSCULAR; INTRAVENOUS EVERY 4 HOURS
Status: DISCONTINUED | OUTPATIENT
Start: 2018-03-17 | End: 2018-03-17

## 2018-03-17 RX ORDER — DOFETILIDE 0.5 MG/1
500 CAPSULE ORAL EVERY 12 HOURS SCHEDULED
Status: DISCONTINUED | OUTPATIENT
Start: 2018-03-17 | End: 2018-03-18 | Stop reason: DRUGHIGH

## 2018-03-17 RX ADMIN — FUROSEMIDE 40 MG: 10 INJECTION, SOLUTION INTRAMUSCULAR; INTRAVENOUS at 21:00

## 2018-03-17 RX ADMIN — DOXYCYCLINE 100 MG: 100 CAPSULE ORAL at 08:56

## 2018-03-17 RX ADMIN — CITALOPRAM HYDROBROMIDE 40 MG: 20 TABLET ORAL at 08:55

## 2018-03-17 RX ADMIN — APIXABAN 5 MG: 5 TABLET, FILM COATED ORAL at 08:55

## 2018-03-17 RX ADMIN — CETIRIZINE HYDROCHLORIDE 10 MG: 10 TABLET, FILM COATED ORAL at 08:55

## 2018-03-17 RX ADMIN — METFORMIN HYDROCHLORIDE 500 MG: 500 TABLET, EXTENDED RELEASE ORAL at 17:39

## 2018-03-17 RX ADMIN — POTASSIUM CHLORIDE 20 MEQ: 750 CAPSULE, EXTENDED RELEASE ORAL at 08:55

## 2018-03-17 RX ADMIN — METOPROLOL TARTRATE 12.5 MG: 25 TABLET, FILM COATED ORAL at 23:50

## 2018-03-17 RX ADMIN — FUROSEMIDE 40 MG: 10 INJECTION, SOLUTION INTRAMUSCULAR; INTRAVENOUS at 09:59

## 2018-03-17 RX ADMIN — ASPIRIN 81 MG: 81 TABLET, COATED ORAL at 08:55

## 2018-03-17 RX ADMIN — APIXABAN 5 MG: 5 TABLET, FILM COATED ORAL at 21:00

## 2018-03-17 RX ADMIN — DOFETILIDE 500 MCG: 0.5 CAPSULE ORAL at 21:00

## 2018-03-17 RX ADMIN — PANTOPRAZOLE SODIUM 40 MG: 40 TABLET, DELAYED RELEASE ORAL at 06:32

## 2018-03-17 RX ADMIN — DOXYCYCLINE 100 MG: 100 CAPSULE ORAL at 21:00

## 2018-03-17 RX ADMIN — METFORMIN HYDROCHLORIDE 500 MG: 500 TABLET, EXTENDED RELEASE ORAL at 08:55

## 2018-03-17 NOTE — PLAN OF CARE
Problem: Fall Risk (Adult)  Goal: Absence of Fall  Outcome: Ongoing (interventions implemented as appropriate)      Problem: Patient Care Overview  Goal: Plan of Care Review  Outcome: Ongoing (interventions implemented as appropriate)   03/17/18 0534   Coping/Psychosocial   Plan of Care Reviewed With patient   Plan of Care Review   Progress no change   OTHER   Outcome Summary VSS, A-Fib/Sinus Tach on monitor with PAC's and PVC's occasionally, on 3L NC. Pt continues to be tachycardic with heart rate ranging 100-130's, which increases greater than 130 when patient is ambulating to bathroom. Plan to start tikosyn today per MD note. Total urine output this shift has been 3200 ml, with a 24 hr total of 4000ml. In the past two days 3/15-3/17 patient has had a total of 8850 ml of urine output. Will continue to monitor.        Problem: Cardiac: Heart Failure (Adult)  Goal: Signs and Symptoms of Listed Potential Problems Will be Absent, Minimized or Managed (Cardiac: Heart Failure)  Outcome: Ongoing (interventions implemented as appropriate)      Problem: Fluid Volume Excess (Adult)  Goal: Optimal Fluid Balance  Outcome: Ongoing (interventions implemented as appropriate)      Problem: Skin Injury Risk (Adult)  Goal: Skin Health and Integrity  Outcome: Ongoing (interventions implemented as appropriate)

## 2018-03-17 NOTE — PROGRESS NOTES
[facility]       LOS: 3 days   Patient Care Team:  Jessica Portillo as PCP - General (Nurse Practitioner)        Subjective       Patient Denies:  Cp, sob, palpitations      Vital Signs  Temp:  [97.7 °F (36.5 °C)-99.2 °F (37.3 °C)] 98.7 °F (37.1 °C)  Heart Rate:  [105-152] 129  Resp:  [18-20] 18  BP: (100-137)/(58-88) 104/58    Intake/Output Summary (Last 24 hours) at 03/17/18 1044  Last data filed at 03/17/18 0900   Gross per 24 hour   Intake              440 ml   Output             4000 ml   Net            -3560 ml     I/O this shift:  In: 140 [P.O.:140]  Out: -     Physical Exam:     General Appearance:    Alert, cooperative, in no acute distress       Neck:   No adenopathy, supple, trachea midline, no thyromegaly, no JVD       Lungs:     Clear to auscultation,respirations regular, even and                  unlabored    Heart:    Irregular rhythm and tachy rate, normal S1 and S2, no            murmur, no gallop, no rub, no click   Chest Wall:    No abnormalities observed   Abdomen:     Normal bowel sounds, no masses, no organomegaly, soft        non-tender, non-distended       Extremities:   Moves all extremities well, no edema, no cyanosis, no             redness   Pulses:   Pulses palpable and equal bilaterally     Results Review:     I reviewed the patient's new clinical results.      WBC WBC   Date/Time Value Ref Range Status   03/14/2018 1337 12.19 (H) 3.50 - 10.80 10*3/mm3 Final            HGB Hemoglobin   Date/Time Value Ref Range Status   03/14/2018 1337 13.8 11.5 - 15.5 g/dL Final           HCT Hematocrit   Date/Time Value Ref Range Status   03/14/2018 1337 45.1 (H) 34.5 - 44.0 % Final            Platlets No results found for: LABPLAT  Sodium  Sodium   Date/Time Value Ref Range Status   03/17/2018 0559 137 132 - 146 mmol/L Final   03/16/2018 0625 137 132 - 146 mmol/L Final   03/15/2018 0704 140 132 - 146 mmol/L Final   03/14/2018 2110 138  132 - 146 mmol/L Final   03/14/2018 1337 139 132 - 146 mmol/L Final     Potassium  Potassium   Date/Time Value Ref Range Status   03/17/2018 0559 3.7 3.5 - 5.5 mmol/L Final   03/16/2018 0625 3.6 3.5 - 5.5 mmol/L Final   03/15/2018 1714 3.9 3.5 - 5.5 mmol/L Final   03/15/2018 0704 3.4 (L) 3.5 - 5.5 mmol/L Final   03/14/2018 2110 3.5 3.5 - 5.5 mmol/L Final   03/14/2018 1337 3.7 3.5 - 5.5 mmol/L Final     Chloride  Chloride   Date/Time Value Ref Range Status   03/17/2018 0559 89 (L) 99 - 109 mmol/L Final   03/16/2018 0625 89 (L) 99 - 109 mmol/L Final   03/15/2018 0704 94 (L) 99 - 109 mmol/L Final   03/14/2018 2110 93 (L) 99 - 109 mmol/L Final   03/14/2018 1337 96 (L) 99 - 109 mmol/L Final     BicarbonateNo results found for: PLASMABICARB    BUN BUN   Date/Time Value Ref Range Status   03/17/2018 0559 11 9 - 23 mg/dL Final   03/16/2018 0625 11 9 - 23 mg/dL Final   03/15/2018 0704 11 9 - 23 mg/dL Final   03/14/2018 2110 11 9 - 23 mg/dL Final   03/14/2018 1337 12 9 - 23 mg/dL Final      Creatinine Creatinine   Date/Time Value Ref Range Status   03/17/2018 0559 0.70 0.60 - 1.30 mg/dL Final   03/16/2018 0625 0.70 0.60 - 1.30 mg/dL Final   03/15/2018 0704 0.70 0.60 - 1.30 mg/dL Final   03/14/2018 2110 0.70 0.60 - 1.30 mg/dL Final   03/14/2018 1337 0.80 0.60 - 1.30 mg/dL Final      Calcium Calcium   Date/Time Value Ref Range Status   03/17/2018 0559 8.8 8.7 - 10.4 mg/dL Final   03/16/2018 0625 8.3 (L) 8.7 - 10.4 mg/dL Final   03/15/2018 0704 8.2 (L) 8.7 - 10.4 mg/dL Final   03/14/2018 2110 9.1 8.7 - 10.4 mg/dL Final   03/14/2018 1337 8.8 8.7 - 10.4 mg/dL Final      Mag Magnesium   Date/Time Value Ref Range Status   03/17/2018 0559 2.5 1.3 - 2.7 mg/dL Final   03/16/2018 0625 1.5 1.3 - 2.7 mg/dL Final           PT/INR:  No results found for: PROTIME/No results found for: INR  Troponin I   Lab Results   Component Value Date    TROPONINI <0.006 12/22/2017         apixaban 5 mg Oral Q12H   aspirin 81 mg Oral Daily   cetirizine 10  mg Oral Daily   citalopram 40 mg Oral Daily   doxycycline 100 mg Oral Q12H   furosemide 40 mg Intravenous Q12H   insulin lispro 0-7 Units Subcutaneous 4x Daily With Meals & Nightly   metFORMIN  mg Oral BID With Meals   pantoprazole 40 mg Oral QAM   pharmacy consult - [facility]  Does not apply Daily   potassium chloride 20 mEq Oral Daily       Pharmacy Consult        Assessment/Plan     Patient Active Problem List   Diagnosis Code   • Sustained SVT I47.1   • COPD (chronic obstructive pulmonary disease) J44.9   • Diabetes mellitus E11.9   • Neuropathy G62.9   • Tobacco abuse Z72.0   • CHF (congestive heart failure) I50.9   • Class 3 obesity in adult E66.9   • Atrial fibrillation with rapid ventricular response I48.91   • Pulmonary embolism I26.99   • Acute combined systolic and diastolic heart failure I50.41   • Acute combined systolic and diastolic congestive heart failure I50.41     Initiate Tikosyn  Cont anticoagulation    BAILEY Green  03/17/18  10:44 AM

## 2018-03-17 NOTE — PROGRESS NOTES
"Tikosyn Initiation - Pharmacy Evaluation    Name- Maria T Garcia  Age- 57 y.o.  Sex- female  HT - 157.5 cm (62\")  Wt - 115 kg (252 lb 12.8 oz)      Evaluation of Drug-Drug Interactions     Previous antiarrythmic medications D/C 'ed prior to admission      Amiodarone D/C 'ed >3 weeks   Sotalol D/C 'ed > 48hr   Class I antiarrythmic's (Disopyramide,Flecainide, Mexiletine, Propafenone) D/C 'ed >48hr      Proceed - Yes      Drug-Drug Interactions with admission regimen    The Following medications are contraindicated with Dofetilide and will be discontinued:  N/a    The following medications are recognized to prolong QT interval, however the medication continue during initial Dofetilide dosing:    Furosemide    The following medications may increase Dofetilide serum concentrations and can be co-administered:    Metformin  Citalopram    Laboratory    Results from last 7 days   Lab Units 03/17/18  1133 03/17/18  0559 03/16/18  0625   SODIUM mmol/L 135 137 137   POTASSIUM mmol/L 3.7 3.7 3.6   CHLORIDE mmol/L 90* 89* 89*   CO2 mmol/L 39.0* 39.0* 39.0*   BUN mg/dL 11 11 11   CREATININE mg/dL 0.80 0.70 0.70   GLUCOSE mg/dL 144* 111* 107*   CALCIUM mg/dL 8.7 8.8 8.3*       Electrolyte replacement ordered:   Mg <2.0- no     K <4.0  - yes; replacement protocol ordered    Est CrCl =  > 100 ml/min  (calculated with Cockroft-Gault equation using actual body weight and serum creatinine for calculation)  (laboratory values from previous 24 hours can be used)      Initial Dose:    Yes - CrCl >60      Dofetilide 500mcg po q12h      Sotalol was discontinued on 3/15 (last dose received @ 1022).  Planning on initiating dofetilide 3/17 after a 48h washout of sotalol.      Note that patient is also ordered furosemide (may prolong QTc), metformin, and citalopram (may increase dofetilide concentrations, but can be co-administered).      EKG shows QTc = 375 msec, would recommend dofetilide 500mcg PO q12h. Start this evening ok per cardiology.  " Repeat EKG in AM.    Thank you for this consult,  Christi Bernal, Gurjit  03/17/18  12:45 PM

## 2018-03-18 LAB
ANION GAP SERPL CALCULATED.3IONS-SCNC: 10 MMOL/L (ref 3–11)
BUN BLD-MCNC: 13 MG/DL (ref 9–23)
BUN/CREAT SERPL: 18.6 (ref 7–25)
CALCIUM SPEC-SCNC: 8.6 MG/DL (ref 8.7–10.4)
CHLORIDE SERPL-SCNC: 90 MMOL/L (ref 99–109)
CO2 SERPL-SCNC: 38 MMOL/L (ref 20–31)
CREAT BLD-MCNC: 0.7 MG/DL (ref 0.6–1.3)
GFR SERPL CREATININE-BSD FRML MDRD: 87 ML/MIN/1.73
GLUCOSE BLD-MCNC: 97 MG/DL (ref 70–100)
GLUCOSE BLDC GLUCOMTR-MCNC: 102 MG/DL (ref 70–130)
GLUCOSE BLDC GLUCOMTR-MCNC: 113 MG/DL (ref 70–130)
GLUCOSE BLDC GLUCOMTR-MCNC: 126 MG/DL (ref 70–130)
GLUCOSE BLDC GLUCOMTR-MCNC: 127 MG/DL (ref 70–130)
MAGNESIUM SERPL-MCNC: 2.1 MG/DL (ref 1.3–2.7)
POTASSIUM BLD-SCNC: 3.5 MMOL/L (ref 3.5–5.5)
SODIUM BLD-SCNC: 138 MMOL/L (ref 132–146)

## 2018-03-18 PROCEDURE — 93005 ELECTROCARDIOGRAM TRACING: CPT | Performed by: PHYSICIAN ASSISTANT

## 2018-03-18 PROCEDURE — 93010 ELECTROCARDIOGRAM REPORT: CPT | Performed by: INTERNAL MEDICINE

## 2018-03-18 PROCEDURE — 82962 GLUCOSE BLOOD TEST: CPT

## 2018-03-18 PROCEDURE — 83735 ASSAY OF MAGNESIUM: CPT

## 2018-03-18 PROCEDURE — 25010000002 FUROSEMIDE PER 20 MG: Performed by: NURSE PRACTITIONER

## 2018-03-18 PROCEDURE — 80048 BASIC METABOLIC PNL TOTAL CA: CPT | Performed by: NURSE PRACTITIONER

## 2018-03-18 PROCEDURE — 93005 ELECTROCARDIOGRAM TRACING: CPT | Performed by: INTERNAL MEDICINE

## 2018-03-18 RX ORDER — DOFETILIDE 0.25 MG/1
250 CAPSULE ORAL EVERY 12 HOURS SCHEDULED
Status: DISCONTINUED | OUTPATIENT
Start: 2018-03-18 | End: 2018-03-21 | Stop reason: HOSPADM

## 2018-03-18 RX ORDER — POTASSIUM CHLORIDE 750 MG/1
40 CAPSULE, EXTENDED RELEASE ORAL ONCE
Status: COMPLETED | OUTPATIENT
Start: 2018-03-18 | End: 2018-03-18

## 2018-03-18 RX ADMIN — APIXABAN 5 MG: 5 TABLET, FILM COATED ORAL at 21:53

## 2018-03-18 RX ADMIN — PANTOPRAZOLE SODIUM 40 MG: 40 TABLET, DELAYED RELEASE ORAL at 06:52

## 2018-03-18 RX ADMIN — DOXYCYCLINE 100 MG: 100 CAPSULE ORAL at 21:54

## 2018-03-18 RX ADMIN — METFORMIN HYDROCHLORIDE 500 MG: 500 TABLET, EXTENDED RELEASE ORAL at 17:34

## 2018-03-18 RX ADMIN — FUROSEMIDE 40 MG: 10 INJECTION, SOLUTION INTRAMUSCULAR; INTRAVENOUS at 09:36

## 2018-03-18 RX ADMIN — POTASSIUM CHLORIDE 40 MEQ: 750 CAPSULE, EXTENDED RELEASE ORAL at 12:50

## 2018-03-18 RX ADMIN — APIXABAN 5 MG: 5 TABLET, FILM COATED ORAL at 09:03

## 2018-03-18 RX ADMIN — ASPIRIN 81 MG: 81 TABLET, COATED ORAL at 09:04

## 2018-03-18 RX ADMIN — DOFETILIDE 250 MCG: 0.25 CAPSULE ORAL at 21:54

## 2018-03-18 RX ADMIN — CITALOPRAM HYDROBROMIDE 40 MG: 20 TABLET ORAL at 09:03

## 2018-03-18 RX ADMIN — METFORMIN HYDROCHLORIDE 500 MG: 500 TABLET, EXTENDED RELEASE ORAL at 09:05

## 2018-03-18 RX ADMIN — Medication 10 ML: at 21:54

## 2018-03-18 RX ADMIN — DOXYCYCLINE 100 MG: 100 CAPSULE ORAL at 09:04

## 2018-03-18 RX ADMIN — FUROSEMIDE 40 MG: 10 INJECTION, SOLUTION INTRAMUSCULAR; INTRAVENOUS at 21:53

## 2018-03-18 RX ADMIN — DOFETILIDE 500 MCG: 0.5 CAPSULE ORAL at 09:37

## 2018-03-18 RX ADMIN — POTASSIUM CHLORIDE 20 MEQ: 750 CAPSULE, EXTENDED RELEASE ORAL at 09:03

## 2018-03-18 RX ADMIN — METOPROLOL TARTRATE 12.5 MG: 25 TABLET, FILM COATED ORAL at 09:04

## 2018-03-18 RX ADMIN — METOPROLOL TARTRATE 12.5 MG: 25 TABLET, FILM COATED ORAL at 21:54

## 2018-03-18 RX ADMIN — CETIRIZINE HYDROCHLORIDE 10 MG: 10 TABLET, FILM COATED ORAL at 09:03

## 2018-03-18 NOTE — PLAN OF CARE
Problem: Patient Care Overview  Goal: Plan of Care Review  Outcome: Ongoing (interventions implemented as appropriate)   03/18/18 0417   Coping/Psychosocial   Plan of Care Reviewed With patient   Plan of Care Review   Progress no change   OTHER   Outcome Summary VSS, A-Fib/Flutter on monitor, on 3L NC. Heart rate was elevated throughout shift (110-150), called MD on called and recieved metoprolol orders. Since admin of medication pt's heart rate has been much improved lowering to 90's-110. Recieved first dose of tikosyn at 2100 last night. Have monitored rhythm and QTC. Will have another EKG this AM. Will continue to monitor.        Problem: Cardiac: Heart Failure (Adult)  Goal: Signs and Symptoms of Listed Potential Problems Will be Absent, Minimized or Managed (Cardiac: Heart Failure)  Outcome: Ongoing (interventions implemented as appropriate)      Problem: Fluid Volume Excess (Adult)  Goal: Identify Related Risk Factors and Signs and Symptoms  Outcome: Outcome(s) achieved Date Met: 03/18/18    Goal: Optimal Fluid Balance  Outcome: Ongoing (interventions implemented as appropriate)      Problem: Skin Injury Risk (Adult)  Goal: Identify Related Risk Factors and Signs and Symptoms  Outcome: Outcome(s) achieved Date Met: 03/18/18    Goal: Skin Health and Integrity  Outcome: Ongoing (interventions implemented as appropriate)

## 2018-03-18 NOTE — PROGRESS NOTES
[facility]       LOS: 4 days   Patient Care Team:  Jessica Portillo as PCP - General (Nurse Practitioner)        Subjective       Patient Denies:  Cp, sob, palpitations      Vital Signs  Temp:  [97.9 °F (36.6 °C)-98.6 °F (37 °C)] 97.9 °F (36.6 °C)  Heart Rate:  [] 100  Resp:  [18-20] 18  BP: ()/() 103/65    Intake/Output Summary (Last 24 hours) at 03/18/18 0906  Last data filed at 03/18/18 0600   Gross per 24 hour   Intake              600 ml   Output             3400 ml   Net            -2800 ml     No intake/output data recorded.    Physical Exam:     General Appearance:    Alert, cooperative, in no acute distress       Neck:   No adenopathy, supple, trachea midline, no thyromegaly, no JVD       Lungs:     Clear to auscultation,respirations regular, even and                  unlabored    Heart:    Irregular rhythm and normal rate, normal S1 and S2, no            murmur, no gallop, no rub, no click   Chest Wall:    No abnormalities observed   Abdomen:     Normal bowel sounds, no masses, no organomegaly, soft        non-tender, non-distended       Extremities:   Moves all extremities well, no edema, no cyanosis, no             redness   Pulses:   Pulses palpable and equal bilaterally     Results Review:     I reviewed the patient's new clinical results.      WBC No results found for: WBC         HGB No results found for: HGB        HCT No results found for: HCT         Platlets No results found for: LABPLAT  Sodium  Sodium   Date/Time Value Ref Range Status   03/18/2018 0645 138 132 - 146 mmol/L Final   03/17/2018 1133 135 132 - 146 mmol/L Final   03/17/2018 0559 137 132 - 146 mmol/L Final   03/16/2018 0625 137 132 - 146 mmol/L Final     Potassium  Potassium   Date/Time Value Ref Range Status   03/18/2018 0645 3.5 3.5 - 5.5 mmol/L Final   03/17/2018 1133 3.7 3.5 - 5.5 mmol/L Final   03/17/2018 0559 3.7 3.5 - 5.5 mmol/L Final    03/16/2018 0625 3.6 3.5 - 5.5 mmol/L Final   03/15/2018 1714 3.9 3.5 - 5.5 mmol/L Final     Chloride  Chloride   Date/Time Value Ref Range Status   03/18/2018 0645 90 (L) 99 - 109 mmol/L Final   03/17/2018 1133 90 (L) 99 - 109 mmol/L Final   03/17/2018 0559 89 (L) 99 - 109 mmol/L Final   03/16/2018 0625 89 (L) 99 - 109 mmol/L Final     BicarbonateNo results found for: PLASMABICARB    BUN BUN   Date/Time Value Ref Range Status   03/18/2018 0645 13 9 - 23 mg/dL Final   03/17/2018 1133 11 9 - 23 mg/dL Final   03/17/2018 0559 11 9 - 23 mg/dL Final   03/16/2018 0625 11 9 - 23 mg/dL Final      Creatinine Creatinine   Date/Time Value Ref Range Status   03/18/2018 0645 0.70 0.60 - 1.30 mg/dL Final   03/17/2018 1133 0.80 0.60 - 1.30 mg/dL Final   03/17/2018 0559 0.70 0.60 - 1.30 mg/dL Final   03/16/2018 0625 0.70 0.60 - 1.30 mg/dL Final      Calcium Calcium   Date/Time Value Ref Range Status   03/18/2018 0645 8.6 (L) 8.7 - 10.4 mg/dL Final   03/17/2018 1133 8.7 8.7 - 10.4 mg/dL Final   03/17/2018 0559 8.8 8.7 - 10.4 mg/dL Final   03/16/2018 0625 8.3 (L) 8.7 - 10.4 mg/dL Final      Mag Magnesium   Date/Time Value Ref Range Status   03/18/2018 0645 2.1 1.3 - 2.7 mg/dL Final   03/17/2018 1133 2.4 1.3 - 2.7 mg/dL Final   03/17/2018 0559 2.5 1.3 - 2.7 mg/dL Final   03/16/2018 0625 1.5 1.3 - 2.7 mg/dL Final           PT/INR:  No results found for: PROTIME/No results found for: INR  Troponin I     Lab Results   Component Value Date    TROPONINI <0.006 12/22/2017         apixaban 5 mg Oral Q12H   aspirin 81 mg Oral Daily   cetirizine 10 mg Oral Daily   citalopram 40 mg Oral Daily   dofetilide 500 mcg Oral Q12H   doxycycline 100 mg Oral Q12H   furosemide 40 mg Intravenous Q12H   insulin lispro 0-7 Units Subcutaneous 4x Daily With Meals & Nightly   metFORMIN  mg Oral BID With Meals   metoprolol tartrate 12.5 mg Oral Q12H   pantoprazole 40 mg Oral QAM   pharmacy consult - MTM  Does not apply Daily   potassium chloride 20 mEq  Oral Daily       Pharmacy Consult        Assessment/Plan     Patient Active Problem List   Diagnosis Code   • Sustained SVT I47.1   • COPD (chronic obstructive pulmonary disease) J44.9   • Diabetes mellitus E11.9   • Neuropathy G62.9   • Tobacco abuse Z72.0   • CHF (congestive heart failure) I50.9   • Class 3 obesity in adult E66.9   • Atrial fibrillation with rapid ventricular response I48.91   • Pulmonary embolism I26.99   • Acute combined systolic and diastolic heart failure I50.41   • Acute combined systolic and diastolic congestive heart failure I50.41     QT ok  Cont anticoagulation  Possible ECV in am    BAILEY Green  03/18/18  9:06 AM

## 2018-03-19 LAB
ANION GAP SERPL CALCULATED.3IONS-SCNC: 6 MMOL/L (ref 3–11)
BUN BLD-MCNC: 13 MG/DL (ref 9–23)
BUN/CREAT SERPL: 16.3 (ref 7–25)
CALCIUM SPEC-SCNC: 8.9 MG/DL (ref 8.7–10.4)
CHLORIDE SERPL-SCNC: 93 MMOL/L (ref 99–109)
CO2 SERPL-SCNC: 38 MMOL/L (ref 20–31)
CREAT BLD-MCNC: 0.8 MG/DL (ref 0.6–1.3)
GFR SERPL CREATININE-BSD FRML MDRD: 74 ML/MIN/1.73
GLUCOSE BLD-MCNC: 91 MG/DL (ref 70–100)
GLUCOSE BLDC GLUCOMTR-MCNC: 110 MG/DL (ref 70–130)
GLUCOSE BLDC GLUCOMTR-MCNC: 117 MG/DL (ref 70–130)
GLUCOSE BLDC GLUCOMTR-MCNC: 118 MG/DL (ref 70–130)
GLUCOSE BLDC GLUCOMTR-MCNC: 125 MG/DL (ref 70–130)
MAGNESIUM SERPL-MCNC: 2.1 MG/DL (ref 1.3–2.7)
POTASSIUM BLD-SCNC: 4.2 MMOL/L (ref 3.5–5.5)
SODIUM BLD-SCNC: 137 MMOL/L (ref 132–146)

## 2018-03-19 PROCEDURE — 80048 BASIC METABOLIC PNL TOTAL CA: CPT | Performed by: NURSE PRACTITIONER

## 2018-03-19 PROCEDURE — 99232 SBSQ HOSP IP/OBS MODERATE 35: CPT | Performed by: INTERNAL MEDICINE

## 2018-03-19 PROCEDURE — 25010000002 FUROSEMIDE PER 20 MG: Performed by: NURSE PRACTITIONER

## 2018-03-19 PROCEDURE — 93005 ELECTROCARDIOGRAM TRACING: CPT | Performed by: PHYSICIAN ASSISTANT

## 2018-03-19 PROCEDURE — 83735 ASSAY OF MAGNESIUM: CPT

## 2018-03-19 PROCEDURE — 82962 GLUCOSE BLOOD TEST: CPT

## 2018-03-19 RX ADMIN — METFORMIN HYDROCHLORIDE 500 MG: 500 TABLET, EXTENDED RELEASE ORAL at 17:57

## 2018-03-19 RX ADMIN — CETIRIZINE HYDROCHLORIDE 10 MG: 10 TABLET, FILM COATED ORAL at 08:45

## 2018-03-19 RX ADMIN — METOPROLOL TARTRATE 12.5 MG: 25 TABLET, FILM COATED ORAL at 08:46

## 2018-03-19 RX ADMIN — DOXYCYCLINE 100 MG: 100 CAPSULE ORAL at 21:53

## 2018-03-19 RX ADMIN — PANTOPRAZOLE SODIUM 40 MG: 40 TABLET, DELAYED RELEASE ORAL at 06:11

## 2018-03-19 RX ADMIN — POTASSIUM CHLORIDE 20 MEQ: 750 CAPSULE, EXTENDED RELEASE ORAL at 08:45

## 2018-03-19 RX ADMIN — FUROSEMIDE 40 MG: 10 INJECTION, SOLUTION INTRAMUSCULAR; INTRAVENOUS at 09:49

## 2018-03-19 RX ADMIN — DOFETILIDE 250 MCG: 0.25 CAPSULE ORAL at 08:44

## 2018-03-19 RX ADMIN — Medication 10 ML: at 21:54

## 2018-03-19 RX ADMIN — METOPROLOL TARTRATE 12.5 MG: 25 TABLET, FILM COATED ORAL at 21:53

## 2018-03-19 RX ADMIN — APIXABAN 5 MG: 5 TABLET, FILM COATED ORAL at 08:45

## 2018-03-19 RX ADMIN — FUROSEMIDE 40 MG: 10 INJECTION, SOLUTION INTRAMUSCULAR; INTRAVENOUS at 21:53

## 2018-03-19 RX ADMIN — METFORMIN HYDROCHLORIDE 500 MG: 500 TABLET, EXTENDED RELEASE ORAL at 12:06

## 2018-03-19 RX ADMIN — ASPIRIN 81 MG: 81 TABLET, COATED ORAL at 08:46

## 2018-03-19 RX ADMIN — APIXABAN 5 MG: 5 TABLET, FILM COATED ORAL at 21:53

## 2018-03-19 RX ADMIN — CITALOPRAM HYDROBROMIDE 40 MG: 20 TABLET ORAL at 08:45

## 2018-03-19 RX ADMIN — DOXYCYCLINE 100 MG: 100 CAPSULE ORAL at 08:46

## 2018-03-19 RX ADMIN — DOFETILIDE 250 MCG: 0.25 CAPSULE ORAL at 21:53

## 2018-03-19 NOTE — PLAN OF CARE
Problem: Fall Risk (Adult)  Goal: Identify Related Risk Factors and Signs and Symptoms  Outcome: Ongoing (interventions implemented as appropriate)   03/14/18 2053 03/15/18 1056   Fall Risk (Adult)   Related Risk Factors (Fall Risk) --  fatigue/slow reaction;sleep pattern alteration;gait/mobility problems   Signs and Symptoms (Fall Risk) presence of risk factors --      Goal: Absence of Fall  Outcome: Ongoing (interventions implemented as appropriate)  No falls thus far   03/19/18 0553   Fall Risk (Adult)   Absence of Fall making progress toward outcome

## 2018-03-19 NOTE — PROGRESS NOTES
Continued Stay Note   [facility]     Patient Name: Maria T Garcia  MRN: [id number]  Today's Date: 3/19/2018    Admit Date: 3/14/2018          Discharge Plan     Row Name 03/19/18 1604       Plan    Plan Home    Plan Comments Attempted to meet with patient in the room to discuss discharge planning, but she was sleeping. Patient is scheduled for ECV later today. Patient's previous plan had been home with home health at discharge. She will need an order to resume home health. Patient will also need a script for Tikosyn/dofetilide left in the chart so that the pharmacy can run it to get patient's OOP cost. CM will continue to follow.               Discharge Codes    No documentation.       Expected Discharge Date and Time     Expected Discharge Date Expected Discharge Time    Mar 21, 2018             Racquel Bolanos

## 2018-03-19 NOTE — PROGRESS NOTES
[place] [facility] at [facility]    Inpatient Progress Note      Chief Complaint/Reason for service:    · Acute systolic heart failure         Subjective:     No dyspnea at rest, dyspnea with exertion is improved. Has some persistent swelling of the lower extremities. Slept well. No palpitations but Notes that her heart rate is elevated.      Past medical, surgical, social and family history reviewed in the patient's electronic medical record.    Review of Systems:   Positive for dyspnea with exertion, lower extremity edema.  Negative for exertional chest pain, palpitations.    Problem List  Active Hospital Problems (** Indicates Principal Problem)    Diagnosis Date Noted   • Acute combined systolic and diastolic heart failure [I50.41] 03/14/2018   • Acute combined systolic and diastolic congestive heart failure [I50.41] 03/14/2018      Resolved Hospital Problems    Diagnosis Date Noted Date Resolved   No resolved problems to display.            Objective:      Current Facility-Administered Medications:   •  acetaminophen (TYLENOL) tablet 650 mg, 650 mg, Oral, Q4H PRN, [place] P Jaciel, APRN  •  albuterol (PROVENTIL) nebulizer solution 0.083% 2.5 mg/3mL, 2.5 mg, Nebulization, Q4H PRN, [place] P Jaciel, APRN  •  apixaban (ELIQUIS) tablet 5 mg, 5 mg, Oral, Q12H, [place] P Jaciel, APRN, 5 mg at 03/18/18 2153  •  aspirin EC tablet 81 mg, 81 mg, Oral, Daily, Billie P Jaciel, APRN, 81 mg at 03/18/18 0904  •  budesonide-formoterol (SYMBICORT) 160-4.5 MCG/ACT inhaler 2 puff, 2 puff, Inhalation, BID PRN, Koby Couch MD  •  cetirizine (zyrTEC) tablet 10 mg, 10 mg, Oral, Daily, [place] P Jaciel, APRN, 10 mg at 03/18/18 0903  •  citalopram (CeleXA) tablet 40 mg, 40 mg, Oral, Daily, [place] P Jaciel, APRN, 40 mg at 03/18/18 0903  •  dextrose (D50W) solution 25 g, 25 g, Intravenous, Q15 Min PRN, [place] P Jaciel, APRN  •  dextrose (GLUTOSE) oral gel 15 g, 15 g, Oral, Q15 Min PRN, LISSETTE Wiggins  •  dofetilide (TIKOSYN)  capsule 250 mcg, 250 mcg, Oral, Q12H, BAILEY Green, 250 mcg at 03/18/18 2154  •  doxycycline (MONODOX) capsule 100 mg, 100 mg, Oral, Q12H, Koby Couch MD, 100 mg at 03/18/18 2154  •  furosemide (LASIX) injection 40 mg, 40 mg, Intravenous, Q12H, [place] P Jaciel, APRN, 40 mg at 03/18/18 2153  •  glucagon (human recombinant) (GLUCAGEN DIAGNOSTIC) injection 1 mg, 1 mg, Subcutaneous, PRN, Osborne P Jaciel, APRN  •  insulin lispro (humaLOG) injection 0-7 Units, 0-7 Units, Subcutaneous, 4x Daily With Meals & Nightly, Osborne P Jaciel, APRN  •  Magnesium Sulfate 2 gram Bolus, followed by 8 gram infusion (total Mg dose 10 grams)- Mg less than or equal to 1mg/dL, 2 g, Intravenous, PRN, 2 g at 03/16/18 1214 **OR** Magnesium Sulfate 6 gram Infusion (2 gm x 3) -Mg 1.1 -1.5 mg/dL, 2 g, Intravenous, PRN, Last Rate: 25 mL/hr at 03/16/18 1421, 2 g at 03/16/18 1421 **OR** magnesium sulfate 4 gram infusion- Mg 1.6-1.9 mg/dL, 4 g, Intravenous, PRN, Mckayla Pinon, [facility], Last Rate: 25 mL/hr at 03/16/18 1241, 4 g at 03/16/18 1241  •  metFORMIN ER (GLUCOPHAGE-XR) 24 hr tablet 500 mg, 500 mg, Oral, BID With Meals, Osborne P Jaciel, APRN, 500 mg at 03/18/18 1734  •  metoprolol tartrate (LOPRESSOR) half tablet 12.5 mg, 12.5 mg, Oral, Q12H, Hodan Salazar MD, 12.5 mg at 03/18/18 2154  •  pantoprazole (PROTONIX) EC tablet 40 mg, 40 mg, Oral, QAM, Billie P Jaciel, APRN, 40 mg at 03/19/18 0611  •  Pharmacy Consult - MTM, , Does not apply, Daily, Tanya Saha [facility]  •  Pharmacy Consult, , Does not apply, Continuous PRN, BAILEY Alan  •  potassium chloride (MICRO-K) CR capsule 40 mEq, 40 mEq, Oral, PRN, 40 mEq at 03/15/18 1307 **OR** potassium chloride (KLOR-CON) packet 40 mEq, 40 mEq, Oral, PRN **OR** potassium chloride 10 mEq in 100 mL IVPB, 10 mEq, Intravenous, Q1H PRN, Osborne P Jaciel, APRN  •  potassium chloride (MICRO-K) CR capsule 20 mEq, 20 mEq, Oral, Daily, Osborne P Jaciel, APRN, 20 mEq at 03/18/18 0903  •  sodium chloride  0.9 % flush 1-10 mL, 1-10 mL, Intravenous, PRN, LISSETTE Wiggins  •  sodium chloride 0.9 % flush 10 mL, 10 mL, Intravenous, PRN, Zachery Ferreira MD, 10 mL at 03/18/18 2154      Vital Sign Min/Max for last 24 hours  Temp  Min: 98 °F (36.7 °C)  Max: 98.9 °F (37.2 °C)   BP  Min: 98/51  Max: 123/76   Pulse  Min: 59  Max: 128   Resp  Min: 18  Max: 18   SpO2  Min: 90 %  Max: 93 %   No Data Recorded      Intake/Output Summary (Last 24 hours) at 03/19/18 0831  Last data filed at 03/19/18 0129   Gross per 24 hour   Intake              460 ml   Output             1850 ml   Net            -1390 ml           CONSTITUTIONAL: No acute distress, normal affect  RESPIRATORY: Normal effort. Clear to auscultation. No rales. Continuous O2 @ 3L.  CARDIOVASCULAR:Irregularly irregular, tachycardia, no murmurs rubs or gallops.    PERIPHERAL VASCULAR: Normal radial pulses bilaterally. There is 1+ peripheral edema bilaterally.    Results Review:   I reviewed the patient's recent labs in the electronic medical record.      Tele:  Atrial tachycardia versus atrial flutter 2:1        Assessment/Plan:     ASSESSMENT:  Assessment  -Acute systolic heart failure, weight gain of 27lbs over 2 weeks. Wt 276 lbs in the ED, now 267 lbs.  -Atypical atrial flutter vs atrial tachycardia s/p MARY/ECV 12/2017 here at [facility], now with recurrence of this rhythm with 2:1 conduction  -Prior SVT s/p remote ablation, data deficit. Patient doesn't recall what hospital she had ablation done at  -Recent Pneumonia, ABx started at [facility], completed 10day course of doxycycline here  -RV dilitation with severe RV dysfunction  -COPD, currently on chronic O2, Symbicort.   -Tobacco dependence  -Obesity    PLAN:  -Continue diuresis, trending renal function and electrolytes  -Continue daily weights and strict I &O's  -Tikosyn with acceptable ECG today; ECV later today; NPO  -Continue apixaban    Koby Couch MD, MSc, [facility]  Interventional Cardiology  [place]  [facility] at [facility]

## 2018-03-19 NOTE — PROGRESS NOTES
[place] [facility] at [facility]  Cardiovascular Consultation Note  Maria T Garcia  [id number]  1960    DATE OF ADMISSION: 3/14/2018  DATE OF FOLLOW UP: 03/19/18    Jessica Portillo    Subjective:     Patient ID: Maria T Garcia is a 57 y.o. female.    Chief Complaint:  Atrial tachycardia/Atrial flutter  Chief Complaint   Patient presents with   • Edema       Allergies:   Allergies   Allergen Reactions   • Codeine Shortness Of Breath   • Morphine And Related Shortness Of Breath   • Naproxen Hives       Current medications:    Current Facility-Administered Medications:   •  acetaminophen (TYLENOL) tablet 650 mg, 650 mg, Oral, Q4H PRN, [place] P Jaciel, APRN  •  albuterol (PROVENTIL) nebulizer solution 0.083% 2.5 mg/3mL, 2.5 mg, Nebulization, Q4H PRN, Billie P Jaciel, APRN  •  apixaban (ELIQUIS) tablet 5 mg, 5 mg, Oral, Q12H, [place] P Jaciel, APRN, 5 mg at 03/19/18 0845  •  aspirin EC tablet 81 mg, 81 mg, Oral, Daily, [place] P Jaciel, APRN, 81 mg at 03/19/18 0846  •  budesonide-formoterol (SYMBICORT) 160-4.5 MCG/ACT inhaler 2 puff, 2 puff, Inhalation, BID PRN, Koby Couch MD  •  cetirizine (zyrTEC) tablet 10 mg, 10 mg, Oral, Daily, Billie P Jaciel, APRN, 10 mg at 03/19/18 0845  •  citalopram (CeleXA) tablet 40 mg, 40 mg, Oral, Daily, Billie P Jaciel, APRN, 40 mg at 03/19/18 0845  •  dextrose (D50W) solution 25 g, 25 g, Intravenous, Q15 Min PRN, [place] P Jaciel, APRN  •  dextrose (GLUTOSE) oral gel 15 g, 15 g, Oral, Q15 Min PRN, Billie P Jaciel, APRN  •  dofetilide (TIKOSYN) capsule 250 mcg, 250 mcg, Oral, Q12H, BAILEY Green, 250 mcg at 03/19/18 0844  •  doxycycline (MONODOX) capsule 100 mg, 100 mg, Oral, Q12H, Koby Couch MD, 100 mg at 03/19/18 0846  •  furosemide (LASIX) injection 40 mg, 40 mg, Intravenous, Q12H, LISSETTE Wiggins, 40 mg at 03/19/18 0949  •  glucagon (human recombinant) (GLUCAGEN DIAGNOSTIC) injection 1 mg, 1 mg, Subcutaneous, PRN, LISSETTE Wiggins  •  insulin lispro  (humaLOG) injection 0-7 Units, 0-7 Units, Subcutaneous, 4x Daily With Meals & Nightly, [place] P Jaciel, APRN  •  Magnesium Sulfate 2 gram Bolus, followed by 8 gram infusion (total Mg dose 10 grams)- Mg less than or equal to 1mg/dL, 2 g, Intravenous, PRN, 2 g at 03/16/18 1214 **OR** Magnesium Sulfate 6 gram Infusion (2 gm x 3) -Mg 1.1 -1.5 mg/dL, 2 g, Intravenous, PRN, Last Rate: 25 mL/hr at 03/16/18 1421, 2 g at 03/16/18 1421 **OR** magnesium sulfate 4 gram infusion- Mg 1.6-1.9 mg/dL, 4 g, Intravenous, PRN, Mckayla Pinon, [facility], Last Rate: 25 mL/hr at 03/16/18 1241, 4 g at 03/16/18 1241  •  metFORMIN ER (GLUCOPHAGE-XR) 24 hr tablet 500 mg, 500 mg, Oral, BID With Meals, [place] P Jaciel, APRN, 500 mg at 03/19/18 1206  •  metoprolol tartrate (LOPRESSOR) half tablet 12.5 mg, 12.5 mg, Oral, Q12H, Hodan Salazar MD, 12.5 mg at 03/19/18 0846  •  pantoprazole (PROTONIX) EC tablet 40 mg, 40 mg, Oral, QAM, Billie P Jaciel, APRN, 40 mg at 03/19/18 0611  •  Pharmacy Consult - [facility], , Does not apply, Daily, Tanya Saha [facility]  •  Pharmacy Consult, , Does not apply, Continuous PRN, BAILEY Alan  •  potassium chloride (MICRO-K) CR capsule 40 mEq, 40 mEq, Oral, PRN, 40 mEq at 03/15/18 1307 **OR** potassium chloride (KLOR-CON) packet 40 mEq, 40 mEq, Oral, PRN **OR** potassium chloride 10 mEq in 100 mL IVPB, 10 mEq, Intravenous, Q1H PRN, [place] P Jaciel, APRN  •  potassium chloride (MICRO-K) CR capsule 20 mEq, 20 mEq, Oral, Daily, [place] P Jaciel, APRN, 20 mEq at 03/19/18 0845  •  sodium chloride 0.9 % flush 1-10 mL, 1-10 mL, Intravenous, PRN, LISSETTE Wiggins  •  sodium chloride 0.9 % flush 10 mL, 10 mL, Intravenous, PRN, Zachery Ferreira MD, 10 mL at 03/18/18 2581    History of Present Illness: No acute events overnight. Denies CP, SOB, LH, Dizziness, near syncope and syncope  Looking back at EKGs seems that pt was in NSR on 3/18 at 1729 but then converted back to Atach this am.     Now in NSR with rates in the  70s    The following portions of the patient's history were reviewed and updated as appropriate: allergies, current medications, past family history, past medical history, past social history, past surgical history and problem list.    ROS:  A complete ROS obtained and negative except as outlined in problem list, HPI and other parts of the note.    Procedures       Objective:       Vitals:    03/19/18 0410 03/19/18 0845 03/19/18 0905 03/19/18 1210   BP: 123/76 106/75 114/74 115/83   BP Location: Right arm Right arm  Right arm   Patient Position: Lying Lying  Lying   Pulse: 102 105 114 70   Resp: 18 19 19   Temp: 98 °F (36.7 °C) 98 °F (36.7 °C)  98.3 °F (36.8 °C)   TempSrc: Oral Oral  Oral   SpO2: 90% 91% 93% 90%   Weight:       Height:           Intake/Output Summary (Last 24 hours) at 03/19/18 1717  Last data filed at 03/19/18 1500   Gross per 24 hour   Intake              840 ml   Output             2300 ml   Net            -1460 ml       Physical Exam:  GENERAL: Obese patient in no acute distress.  HEENT: Normocephalic, atraumatic, PERRLA. Moist mucous membranes.  NECK: No carotid bruits auscultated.  LUNGS: Non labored. Clear to auscultation. 3L O2 via NC  CARDIOVASCULAR: reg . No murmurs, gallops or rubs noted.   ABDOMEN: Soft, non-tender, non-distended. Normoactive bowel sounds.  MUSCULOSKELETAL: No gross deformities. No clubbing or cyanosis  EXT: pulses intact, 1+ bilateral pitting edema  SKIN: Pink, warm.  Neuro: Nonfocal exam. Gait intact    The patient's old records including ambulatory rhythm recordings (ECGs, Holter/event monitor) were reviewed and discussed.      Lab Review:     Results from last 7 days  Lab Units 03/19/18  0625 03/18/18  0645 03/17/18  1133   SODIUM mmol/L 137 138 135   POTASSIUM mmol/L 4.2 3.5 3.7   CHLORIDE mmol/L 93* 90* 90*   CO2 mmol/L 38.0* 38.0* 39.0*   BUN mg/dL 13 13 11   CREATININE mg/dL 0.80 0.70 0.80   GLUCOSE mg/dL 91 97 144*   CALCIUM mg/dL 8.9 8.6* 8.7           Results  from last 7 days  Lab Units 03/14/18  1337   WBC 10*3/mm3 12.19*   HEMOGLOBIN g/dL 13.8   HEMATOCRIT % 45.1*   PLATELETS 10*3/mm3 192               Results from last 7 days  Lab Units 03/19/18  0625   MAGNESIUM mg/dL 2.1                 EKG: 3/19/18 @06:09- atrial tach, 101 bpm, QTc. 3/18 EKG in SR at a rate of 70 bpm  Telemetry: NSR now at a rate of 70 bpm    Assessment & Plan:   1) Acute on chronic systolic heart failure superimposed diastolic heart failure   - EF 46-50%   - Back to dry weight  - Appears euvolemic    Plan:  - Diureses per Dr. Couch with IV Lasix  - I think in the near future she would benefit from a ischemia eval     2) Atrial flutter (atypical) / Atrial tachycardia 2: 1 with CL of about 290-300 msec.   - Remote h/o SVT with ablation- data deficit  - s/p MARY/ECV December 2017  - D/C'd sotalol 3/15 since can increase any further and recurrence of atrial arrhythmias.  - Started Tikosyn on 3/17 at 500 mcg BID which was decreased to 250 mcg BID after 2 doses over the weekend, scheduled to receive 4th dose this AM. Yesterday in chart NSR at 5 pm and then this am went back into Any, Ataradha and now again in NSR. Truly not sure if the QT interval was actually prolonged even on 500 mg twice a day since very tough to tell QT interval with P waves masking the true QT.  While in normal sinus rhythm yesterday patient's QTC was about 450-460 msec.   - Today at 5pm EKG with NSR with QTc about 450-460 msec as well while on Tikosyn 250 mcg BID    Plan:  - Continue on Tikosyn 250 mcg BID , QTc ok in NSR. Will monitor for now.   - Continue Eliquis  - Consider PVA in future after acute issues (PNA, acute systolic heart failure) have resolved however is higher risk due to severe RV dysfunction, COPD on home O2 as well as other comorbidities.  May be best to consider BiV PPM AV cristobal ablation if recurrent atrial arrhythmias with previous EF In the 45% range.      3) Recent PNA  - Continuation of Abx     4) COPD  -  Home O2 of 3 L  - Continue inhalers  - Severe RV dysfunction, Home O2     5) Hypokalemia --> Supplement.               LISSETTE Orr  [facility]  3/19/2018  5:17 PM    I, Sony Toscano, have reviewed the note in full and agree with all aspects of the above including physical exam, assessment, labs and plan with changes made accordingly. Face to Face Time was spent with the patient.    Sony Toscano,   03/19/18  5:24 PM        EMR Dragon/Transcription disclaimer:  Much of this encounter note is an electronic transcription/translation of spoken language to printed text. Electronic translation of spoken language may permit erroneous, or at times, nonsensical words or phrases to be inadvertently transcribed. Although I have reviewed the note for such errors, some may still exist.

## 2018-03-20 LAB
ANION GAP SERPL CALCULATED.3IONS-SCNC: 5 MMOL/L (ref 3–11)
BUN BLD-MCNC: 16 MG/DL (ref 9–23)
BUN/CREAT SERPL: 16 (ref 7–25)
CALCIUM SPEC-SCNC: 8.8 MG/DL (ref 8.7–10.4)
CHLORIDE SERPL-SCNC: 90 MMOL/L (ref 99–109)
CO2 SERPL-SCNC: 39 MMOL/L (ref 20–31)
CREAT BLD-MCNC: 1 MG/DL (ref 0.6–1.3)
GFR SERPL CREATININE-BSD FRML MDRD: 57 ML/MIN/1.73
GLUCOSE BLD-MCNC: 132 MG/DL (ref 70–100)
GLUCOSE BLDC GLUCOMTR-MCNC: 115 MG/DL (ref 70–130)
GLUCOSE BLDC GLUCOMTR-MCNC: 129 MG/DL (ref 70–130)
GLUCOSE BLDC GLUCOMTR-MCNC: 129 MG/DL (ref 70–130)
GLUCOSE BLDC GLUCOMTR-MCNC: 179 MG/DL (ref 70–130)
POTASSIUM BLD-SCNC: 3.9 MMOL/L (ref 3.5–5.5)
SODIUM BLD-SCNC: 134 MMOL/L (ref 132–146)

## 2018-03-20 PROCEDURE — 80048 BASIC METABOLIC PNL TOTAL CA: CPT | Performed by: NURSE PRACTITIONER

## 2018-03-20 PROCEDURE — 82962 GLUCOSE BLOOD TEST: CPT

## 2018-03-20 PROCEDURE — 93005 ELECTROCARDIOGRAM TRACING: CPT | Performed by: INTERNAL MEDICINE

## 2018-03-20 PROCEDURE — 93005 ELECTROCARDIOGRAM TRACING: CPT | Performed by: PHYSICIAN ASSISTANT

## 2018-03-20 PROCEDURE — 93010 ELECTROCARDIOGRAM REPORT: CPT | Performed by: INTERNAL MEDICINE

## 2018-03-20 PROCEDURE — 99232 SBSQ HOSP IP/OBS MODERATE 35: CPT | Performed by: INTERNAL MEDICINE

## 2018-03-20 RX ORDER — FUROSEMIDE 40 MG/1
40 TABLET ORAL
Status: DISCONTINUED | OUTPATIENT
Start: 2018-03-20 | End: 2018-03-21 | Stop reason: HOSPADM

## 2018-03-20 RX ADMIN — CETIRIZINE HYDROCHLORIDE 10 MG: 10 TABLET, FILM COATED ORAL at 08:18

## 2018-03-20 RX ADMIN — METFORMIN HYDROCHLORIDE 500 MG: 500 TABLET, EXTENDED RELEASE ORAL at 08:19

## 2018-03-20 RX ADMIN — DOFETILIDE 250 MCG: 0.25 CAPSULE ORAL at 11:41

## 2018-03-20 RX ADMIN — CITALOPRAM HYDROBROMIDE 40 MG: 20 TABLET ORAL at 08:18

## 2018-03-20 RX ADMIN — APIXABAN 5 MG: 5 TABLET, FILM COATED ORAL at 08:19

## 2018-03-20 RX ADMIN — APIXABAN 5 MG: 5 TABLET, FILM COATED ORAL at 21:10

## 2018-03-20 RX ADMIN — ASPIRIN 81 MG: 81 TABLET, COATED ORAL at 08:19

## 2018-03-20 RX ADMIN — FUROSEMIDE 40 MG: 40 TABLET ORAL at 17:27

## 2018-03-20 RX ADMIN — PANTOPRAZOLE SODIUM 40 MG: 40 TABLET, DELAYED RELEASE ORAL at 06:42

## 2018-03-20 RX ADMIN — METOPROLOL TARTRATE 12.5 MG: 25 TABLET, FILM COATED ORAL at 21:09

## 2018-03-20 RX ADMIN — INSULIN LISPRO 2 UNITS: 100 INJECTION, SOLUTION INTRAVENOUS; SUBCUTANEOUS at 21:54

## 2018-03-20 RX ADMIN — DOFETILIDE 250 MCG: 0.25 CAPSULE ORAL at 21:09

## 2018-03-20 RX ADMIN — POTASSIUM CHLORIDE 20 MEQ: 750 CAPSULE, EXTENDED RELEASE ORAL at 08:19

## 2018-03-20 RX ADMIN — FUROSEMIDE 40 MG: 40 TABLET ORAL at 10:24

## 2018-03-20 RX ADMIN — METFORMIN HYDROCHLORIDE 500 MG: 500 TABLET, EXTENDED RELEASE ORAL at 17:27

## 2018-03-20 RX ADMIN — METOPROLOL TARTRATE 12.5 MG: 25 TABLET, FILM COATED ORAL at 08:22

## 2018-03-20 NOTE — PROGRESS NOTES
[facility] at [facility]  Cardiovascular Consultation Note  Maria T Garcia  [id number]  1960    DATE OF ADMISSION: 3/14/2018  DATE OF FOLLOW UP: 03/20/18    Jessica Portillo    Subjective:     Patient ID: Maria T Garcia is a 57 y.o. female.    Chief Complaint: Atrial tachycardia vs. flutter  Chief Complaint   Patient presents with   • Edema       Allergies:   Allergies   Allergen Reactions   • Codeine Shortness Of Breath   • Morphine And Related Shortness Of Breath   • Naproxen Hives       Current medications:    Current Facility-Administered Medications:   •  acetaminophen (TYLENOL) tablet 650 mg, 650 mg, Oral, Q4H PRN, Billie P Jaciel, APRN  •  albuterol (PROVENTIL) nebulizer solution 0.083% 2.5 mg/3mL, 2.5 mg, Nebulization, Q4H PRN, Faulkner P Jaciel, APRN  •  apixaban (ELIQUIS) tablet 5 mg, 5 mg, Oral, Q12H, Billie P Jaciel, APRN, 5 mg at 03/20/18 0819  •  aspirin EC tablet 81 mg, 81 mg, Oral, Daily, Faulkner P Jaciel, APRN, 81 mg at 03/20/18 0819  •  budesonide-formoterol (SYMBICORT) 160-4.5 MCG/ACT inhaler 2 puff, 2 puff, Inhalation, BID PRN, Koby Couch MD  •  cetirizine (zyrTEC) tablet 10 mg, 10 mg, Oral, Daily, Billie P Jaciel, APRN, 10 mg at 03/20/18 0818  •  citalopram (CeleXA) tablet 40 mg, 40 mg, Oral, Daily, Billie P Jaciel, APRN, 40 mg at 03/20/18 0818  •  dextrose (D50W) solution 25 g, 25 g, Intravenous, Q15 Min PRN, Faulkner P Jaciel, APRN  •  dextrose (GLUTOSE) oral gel 15 g, 15 g, Oral, Q15 Min PRN, Faulkner P Jaciel, APRN  •  dofetilide (TIKOSYN) capsule 250 mcg, 250 mcg, Oral, Q12H, BAILEY Green, 250 mcg at 03/19/18 2153  •  furosemide (LASIX) tablet 40 mg, 40 mg, Oral, BID, Koby Couch MD  •  glucagon (human recombinant) (GLUCAGEN DIAGNOSTIC) injection 1 mg, 1 mg, Subcutaneous, PRN, LISSETTE Wiggins  •  insulin lispro (humaLOG) injection 0-7 Units, 0-7 Units, Subcutaneous, 4x Daily With Meals & Nightly, LISSETTE Wiggins  •  Magnesium Sulfate 2 gram Bolus, followed  by 8 gram infusion (total Mg dose 10 grams)- Mg less than or equal to 1mg/dL, 2 g, Intravenous, PRN, 2 g at 03/16/18 1214 **OR** Magnesium Sulfate 6 gram Infusion (2 gm x 3) -Mg 1.1 -1.5 mg/dL, 2 g, Intravenous, PRN, Last Rate: 25 mL/hr at 03/16/18 1421, 2 g at 03/16/18 1421 **OR** magnesium sulfate 4 gram infusion- Mg 1.6-1.9 mg/dL, 4 g, Intravenous, PRN, Mckayla Pinon, [facility], Last Rate: 25 mL/hr at 03/16/18 1241, 4 g at 03/16/18 1241  •  metFORMIN ER (GLUCOPHAGE-XR) 24 hr tablet 500 mg, 500 mg, Oral, BID With Meals, [place] P Jaciel, APRN, 500 mg at 03/20/18 0819  •  metoprolol tartrate (LOPRESSOR) half tablet 12.5 mg, 12.5 mg, Oral, Q12H, Hodan Salazar MD, 12.5 mg at 03/20/18 0822  •  pantoprazole (PROTONIX) EC tablet 40 mg, 40 mg, Oral, QAM, [place] P Jaciel, APRN, 40 mg at 03/20/18 0642  •  Pharmacy Consult - [facility], , Does not apply, Daily, Tanya Saha, [facility]  •  Pharmacy Consult, , Does not apply, Continuous PRN, BAILEY Alan  •  potassium chloride (MICRO-K) CR capsule 40 mEq, 40 mEq, Oral, PRN, 40 mEq at 03/15/18 1307 **OR** potassium chloride (KLOR-CON) packet 40 mEq, 40 mEq, Oral, PRN **OR** potassium chloride 10 mEq in 100 mL IVPB, 10 mEq, Intravenous, Q1H PRN, [place] P Jaciel, APRN  •  potassium chloride (MICRO-K) CR capsule 20 mEq, 20 mEq, Oral, Daily, Billie P Jaciel, APRN, 20 mEq at 03/20/18 0819  •  sodium chloride 0.9 % flush 1-10 mL, 1-10 mL, Intravenous, PRN, [place] P Jaciel, APRN, 10 mL at 03/19/18 2154  •  sodium chloride 0.9 % flush 10 mL, 10 mL, Intravenous, PRN, Zachery Ferreira MD, 10 mL at 03/18/18 2154    History of Present Illness: No acute events overnight. Denies CP, SOB, LH, dizziness, syncope or near syncope.    The following portions of the patient's history were reviewed and updated as appropriate: allergies, current medications, past family history, past medical history, past social history, past surgical history and problem list.    ROS:  A complete ROS obtained and  negative except as outlined in problem list, HPI and other parts of the note.    Procedures       Objective:       Vitals:    03/19/18 1210 03/19/18 1710 03/19/18 2045 03/20/18 0511   BP: 115/83 124/79 108/60 107/64   BP Location: Right arm Right arm Right arm Right arm   Patient Position: Lying Lying Lying Lying   Pulse: 70 75 73 64   Resp: 19 18 19 19   Temp: 98.3 °F (36.8 °C) 98 °F (36.7 °C) 98.5 °F (36.9 °C) 98.3 °F (36.8 °C)   TempSrc: Oral Oral Oral Oral   SpO2: 90% (!) 88% 92% 90%   Weight:       Height:           Intake/Output Summary (Last 24 hours) at 03/20/18 0835  Last data filed at 03/20/18 0511   Gross per 24 hour   Intake              840 ml   Output             3925 ml   Net            -3085 ml       Physical Exam:  GENERAL: Obese, in no acute distress.  HEENT: Normocephalic, atraumatic, PERRLA. Moist mucous membranes.  NECK: No JVD present at 30°. No carotid bruits auscultated.  LUNGS: Clear to auscultation. Non labored. 3L O2 NC  CARDIOVASCULAR: Regular rate and rhythm. No murmurs, gallops or rubs noted.   ABDOMEN: Soft, non-tender, non-distended. Normoactive bowel sounds.  MUSCULOSKELETAL: No gross deformities. No clubbing or cyanosis  EXT: pulses intact, no swelling.  SKIN: Pink, warm.   Neuro: Nonfocal exam grossly intact.    The patient's old records including ambulatory rhythm recordings (ECGs, Holter/event monitor) were reviewed and discussed.      Lab Review:     Results from last 7 days  Lab Units 03/20/18  0624 03/19/18  0625 03/18/18  0645   SODIUM mmol/L 134 137 138   POTASSIUM mmol/L 3.9 4.2 3.5   CHLORIDE mmol/L 90* 93* 90*   CO2 mmol/L 39.0* 38.0* 38.0*   BUN mg/dL 16 13 13   CREATININE mg/dL 1.00 0.80 0.70   GLUCOSE mg/dL 132* 91 97   CALCIUM mg/dL 8.8 8.9 8.6*           Results from last 7 days  Lab Units 03/14/18  1337   WBC 10*3/mm3 12.19*   HEMOGLOBIN g/dL 13.8   HEMATOCRIT % 45.1*   PLATELETS 10*3/mm3 192               Results from last 7 days  Lab Units 03/19/18  0649    MAGNESIUM mg/dL 2.1                 EKG: QTc about 450-460 ms, 66 bpm, NSR  Telemetry: NSR 77 bpm    Assessment & Plan:   1) Acute on chronic systolic heart failure superimposed diastolic heart failure   - EF 46-50%   - Back to dry weight    Plan:  - Diureses per Dr. Couch with IV Lasix  - I think in the near future she would benefit from a ischemia eval     2) Atrial flutter (atypical) / Atrial tachycardia 2: 1 with CL of about 290-300 msec.   - Remote h/o SVT with ablation- data deficit  - s/p MARY/ECV December 2017  - D/C'd sotalol 3/15 since can increase any further and recurrence of atrial arrhythmias.  - Started Tikosyn on 3/17 at 500 mcg BID which was decreased to 250 mcg BID after 2 doses over the weekend. While in normal sinus rhythm on patient's QTc is about 450-460 msec.   - Remains in NSR     Plan:  - Continue on Tikosyn 250 mcg BID, QTc remains acceptable in NSR. Will monitor for now.   - Continue Eliquis  - Consider PVA in future after acute issues (PNA, acute systolic heart failure) have resolved however is higher risk due to severe RV dysfunction, COPD on home O2 as well as other comorbidities.  May be best to consider BiV PPM AV cristobal ablation if recurrent atrial arrhythmias with previous EF In the 45% range.      3) Recent PNA  - Continuation of Abx     4) COPD  - Home O2 of 3 L  - Continue inhalers  - Severe RV dysfunction     5) Hypokalemia, resolved  - Continue to monitor       Disposition:  Continue on Tikosyn 250 mcg BID with QTc acceptable and continue with current Rx. Will sign off for now. EKG to be sent to our office on Friday and Follow up with me in 6 weeks and follow up with Dr Couch. Radhaed well and discussed with Dr Couch.       LISSETTE [facility]  3/20/2018  8:35 AM    ISony, have reviewed the note in full and agree with all aspects of the above including physical exam, assessment, labs and plan with changes made accordingly.  Face to Face Time was spent with the patient.    Sony Toscano,   03/20/18  11:02 AM        EMR Dragon/Transcription disclaimer:  Much of this encounter note is an electronic transcription/translation of spoken language to printed text. Electronic translation of spoken language may permit erroneous, or at times, nonsensical words or phrases to be inadvertently transcribed. Although I have reviewed the note for such errors, some may still exist.

## 2018-03-20 NOTE — DISCHARGE INSTR - OTHER ORDERS
Remember to contact your hometown pharmacy, [facility], within 5-7 days of needing your Tikosyn refilled so that the pharmacist can order it. Your copay will be $3.35/month. Your pharmacist stated that he ran a script for sotalol recently. DO NOT TAKE the sotalol. He said you can return it to the pharmacy.

## 2018-03-20 NOTE — PLAN OF CARE
Problem: Skin Injury Risk (Adult)  Goal: Skin Health and Integrity  Outcome: Ongoing (interventions implemented as appropriate)

## 2018-03-20 NOTE — PROGRESS NOTES
Continued Stay Note  [facility]     Patient Name: Maria T Garcia  MRN: [id number]  Today's Date: 3/20/2018    Admit Date: 3/14/2018          Discharge Plan     Row Name 03/20/18 1154       Plan    Plan Home with home health, Tikosyn    Patient/Family in Agreement with Plan yes    Plan Comments Per Kadeem with patient's hometown pharmacy, patient's copay for Tikosyn is $3.35/month. He does not have it in stock, so patient would like her first month's supply  delivered via [facility] pharmacy's [email or web address]. Please efax the Tikosyn script to [facility] retail pharmacy. Patient would like to resume home health at discharge. An order has been placed in Epic for MD to cosign. JULIO will notify [employer]  at discharge and will fax the order and DC summary when available. Patient has a portable O2 tank in the room for the ride home. She denies other discharge needs. Famil will provide her ride. CM will continue to follow.     Final Discharge Disposition Code 06 - home with home health care              Discharge Codes    No documentation.       Expected Discharge Date and Time     Expected Discharge Date Expected Discharge Time    Mar 21, 2018             Racquel Bolanos

## 2018-03-20 NOTE — PLAN OF CARE
Problem: Patient Care Overview  Goal: Individualization and Mutuality  Outcome: Ongoing (interventions implemented as appropriate)      Problem: Cardiac: Heart Failure (Adult)  Goal: Signs and Symptoms of Listed Potential Problems Will be Absent, Minimized or Managed (Cardiac: Heart Failure)  Outcome: Ongoing (interventions implemented as appropriate)      Problem: Fluid Volume Excess (Adult)  Goal: Optimal Fluid Balance  Outcome: Ongoing (interventions implemented as appropriate)      Problem: Skin Injury Risk (Adult)  Goal: Skin Health and Integrity  Outcome: Ongoing (interventions implemented as appropriate)

## 2018-03-20 NOTE — PROGRESS NOTES
[facility] at [facility]    Inpatient Progress Note      Chief Complaint/Reason for service:    · Acute systolic heart failure         Subjective:       Patient states she has been feeling much better.  She notes that she can tell a difference now that she is back in normal rhythm.  Her dyspnea remains improved with exertion and she reports no dyspnea at rest.  She denies any palpitations.        Past medical, surgical, social and family history reviewed in the patient's electronic medical record.    Review of Systems:   Positive for dyspnea with exertion, lower extremity edema.  Negative for exertional chest pain, palpitations.    Problem List  Active Hospital Problems (** Indicates Principal Problem)    Diagnosis Date Noted   • Acute combined systolic and diastolic heart failure [I50.41] 03/14/2018   • Acute combined systolic and diastolic congestive heart failure [I50.41] 03/14/2018      Resolved Hospital Problems    Diagnosis Date Noted Date Resolved   No resolved problems to display.            Objective:      Current Facility-Administered Medications:   •  acetaminophen (TYLENOL) tablet 650 mg, 650 mg, Oral, Q4H PRN, Haakon P Jaciel, APRN  •  albuterol (PROVENTIL) nebulizer solution 0.083% 2.5 mg/3mL, 2.5 mg, Nebulization, Q4H PRN, Haakon P Jaciel, APRN  •  apixaban (ELIQUIS) tablet 5 mg, 5 mg, Oral, Q12H, Haakon P Jaciel, APRN, 5 mg at 03/19/18 2153  •  aspirin EC tablet 81 mg, 81 mg, Oral, Daily, Haakon P Jaciel, APRN, 81 mg at 03/19/18 0846  •  budesonide-formoterol (SYMBICORT) 160-4.5 MCG/ACT inhaler 2 puff, 2 puff, Inhalation, BID PRN, Koby Couch MD  •  cetirizine (zyrTEC) tablet 10 mg, 10 mg, Oral, Daily, Billie P Jaciel, APRN, 10 mg at 03/19/18 0845  •  citalopram (CeleXA) tablet 40 mg, 40 mg, Oral, Daily, Haakon P Jaciel, APRN, 40 mg at 03/19/18 0845  •  dextrose (D50W) solution 25 g, 25 g, Intravenous, Q15 Min PRN, Billie P Jaciel, APRN  •  dextrose (GLUTOSE) oral gel 15 g, 15 g,  Oral, Q15 Min PRN, Rich P Jaciel, APRN  •  dofetilide (TIKOSYN) capsule 250 mcg, 250 mcg, Oral, Q12H, BAILEY Green, 250 mcg at 03/19/18 2153  •  furosemide (LASIX) injection 40 mg, 40 mg, Intravenous, Q12H, Rich P Jaciel, APRN, 40 mg at 03/19/18 2153  •  glucagon (human recombinant) (GLUCAGEN DIAGNOSTIC) injection 1 mg, 1 mg, Subcutaneous, PRN, Rich P Jaciel, APRN  •  insulin lispro (humaLOG) injection 0-7 Units, 0-7 Units, Subcutaneous, 4x Daily With Meals & Nightly, Billie P Jaciel, APRN  •  Magnesium Sulfate 2 gram Bolus, followed by 8 gram infusion (total Mg dose 10 grams)- Mg less than or equal to 1mg/dL, 2 g, Intravenous, PRN, 2 g at 03/16/18 1214 **OR** Magnesium Sulfate 6 gram Infusion (2 gm x 3) -Mg 1.1 -1.5 mg/dL, 2 g, Intravenous, PRN, Last Rate: 25 mL/hr at 03/16/18 1421, 2 g at 03/16/18 1421 **OR** magnesium sulfate 4 gram infusion- Mg 1.6-1.9 mg/dL, 4 g, Intravenous, PRN, Mckayla Pinon, [facility], Last Rate: 25 mL/hr at 03/16/18 1241, 4 g at 03/16/18 1241  •  metFORMIN ER (GLUCOPHAGE-XR) 24 hr tablet 500 mg, 500 mg, Oral, BID With Meals, Rich P Jaciel, APRN, 500 mg at 03/19/18 1757  •  metoprolol tartrate (LOPRESSOR) half tablet 12.5 mg, 12.5 mg, Oral, Q12H, Hodan Salazar MD, 12.5 mg at 03/19/18 2153  •  pantoprazole (PROTONIX) EC tablet 40 mg, 40 mg, Oral, QAM, Rich P Jaciel, APRN, 40 mg at 03/19/18 0611  •  Pharmacy Consult - [place], , Does not apply, Daily, Tanya Saha [facility]  •  Pharmacy Consult, , Does not apply, Continuous PRN, BAILEY Alan  •  potassium chloride (MICRO-K) CR capsule 40 mEq, 40 mEq, Oral, PRN, 40 mEq at 03/15/18 1307 **OR** potassium chloride (KLOR-CON) packet 40 mEq, 40 mEq, Oral, PRN **OR** potassium chloride 10 mEq in 100 mL IVPB, 10 mEq, Intravenous, Q1H PRN, Billie P Jaciel, APRN  •  potassium chloride (MICRO-K) CR capsule 20 mEq, 20 mEq, Oral, Daily, Rich P Jaciel, APRN, 20 mEq at 03/19/18 0845  •  sodium chloride 0.9 % flush 1-10 mL, 1-10 mL,  Intravenous, PRN, Billie Grissom, APRN, 10 mL at 03/19/18 2154  •  sodium chloride 0.9 % flush 10 mL, 10 mL, Intravenous, PRN, Zachery Ferreira MD, 10 mL at 03/18/18 2154      Vital Sign Min/Max for last 24 hours  Temp  Min: 98 °F (36.7 °C)  Max: 98.5 °F (36.9 °C)   BP  Min: 106/75  Max: 124/79   Pulse  Min: 64  Max: 114   Resp  Min: 18  Max: 19   SpO2  Min: 88 %  Max: 93 %   No Data Recorded      Intake/Output Summary (Last 24 hours) at 03/20/18 0781  Last data filed at 03/20/18 0511   Gross per 24 hour   Intake              840 ml   Output             3925 ml   Net            -3085 ml           CONSTITUTIONAL: No acute distress, normal affect  RESPIRATORY: Normal effort. Clear to auscultation. No rales. Continuous O2 @ 3L.  CARDIOVASCULAR: Regular rate and rhythm, no murmurs, rubs or gallops.    PERIPHERAL VASCULAR: Normal radial pulses bilaterally. There is 1+ peripheral edema bilaterally (left > right).    Results Review:   I reviewed the patient's recent labs in the electronic medical record.      Tele:  NSR    EKG: NSR       Assessment/Plan:     ASSESSMENT:  Assessment  -Acute systolic heart failure, weight gain of 27lbs over 2 weeks. Weight currently 241.4 lbs was 276 lbs on admission.  -Atypical atrial flutter vs atrial tachycardia s/p MARY/ECV 12/2017 here at [facility], now in normal sinus rhythm.  -Prior SVT s/p remote ablation, data deficit. Patient doesn't recall what hospital she had ablation done at  -Recent Pneumonia, ABx started at [facility], completed 10day course of doxycycline here  -RV dilitation with severe RV dysfunction  -COPD, currently on chronic O2, Symbicort.   -Tobacco dependence  -Obesity    PLAN:  -Switched to PO diuretics  -Continue trending renal function and electrolytes  -Continue daily weights and strict I &O's  -Tikosyn, per EP.  -Continue apixaban.    -Discharge to home tomorrow if EKG okay; of note, has home O2  -Follow up with PCP for BMP and EKG in 1 week upon discharge  -Follow up  with EP/Dr Toscano in 6 weeks  -Follow up with Dr Couch in 3 months    LISSETTE Esparza obtained past medical, family history, social history, review of systems and functioned as a scribe for the remainder of the dictation for Dr. Couch.    I, Koby Couch MD, personally performed the services as scribed by the above named individual. I have made any necessary edits and it is both accurate and complete.     Koby Couch MD, MSc, [facility]  Interventional Cardiology  [facility] at [facility]

## 2018-03-21 VITALS
OXYGEN SATURATION: 92 % | RESPIRATION RATE: 18 BRPM | HEIGHT: 62 IN | HEART RATE: 52 BPM | TEMPERATURE: 98.2 F | WEIGHT: 240.4 LBS | DIASTOLIC BLOOD PRESSURE: 45 MMHG | SYSTOLIC BLOOD PRESSURE: 96 MMHG | BODY MASS INDEX: 44.24 KG/M2

## 2018-03-21 LAB
ANION GAP SERPL CALCULATED.3IONS-SCNC: 5 MMOL/L (ref 3–11)
BUN BLD-MCNC: 17 MG/DL (ref 9–23)
BUN/CREAT SERPL: 17 (ref 7–25)
CALCIUM SPEC-SCNC: 9.8 MG/DL (ref 8.7–10.4)
CHLORIDE SERPL-SCNC: 91 MMOL/L (ref 99–109)
CO2 SERPL-SCNC: 42 MMOL/L (ref 20–31)
CREAT BLD-MCNC: 1 MG/DL (ref 0.6–1.3)
GFR SERPL CREATININE-BSD FRML MDRD: 57 ML/MIN/1.73
GLUCOSE BLD-MCNC: 100 MG/DL (ref 70–100)
GLUCOSE BLDC GLUCOMTR-MCNC: 96 MG/DL (ref 70–130)
POTASSIUM BLD-SCNC: 4.5 MMOL/L (ref 3.5–5.5)
SODIUM BLD-SCNC: 138 MMOL/L (ref 132–146)

## 2018-03-21 PROCEDURE — 93005 ELECTROCARDIOGRAM TRACING: CPT | Performed by: PHYSICIAN ASSISTANT

## 2018-03-21 PROCEDURE — 82962 GLUCOSE BLOOD TEST: CPT

## 2018-03-21 PROCEDURE — 99238 HOSP IP/OBS DSCHRG MGMT 30/<: CPT | Performed by: INTERNAL MEDICINE

## 2018-03-21 PROCEDURE — 80048 BASIC METABOLIC PNL TOTAL CA: CPT | Performed by: NURSE PRACTITIONER

## 2018-03-21 RX ORDER — TORSEMIDE 10 MG/1
10 TABLET ORAL 2 TIMES DAILY
Qty: 60 TABLET | Refills: 11 | Status: SHIPPED | OUTPATIENT
Start: 2018-03-21 | End: 2018-07-20 | Stop reason: DRUGHIGH

## 2018-03-21 RX ORDER — DOFETILIDE 0.25 MG/1
250 CAPSULE ORAL EVERY 12 HOURS SCHEDULED
Qty: 60 CAPSULE | Refills: 5 | Status: SHIPPED | OUTPATIENT
Start: 2018-03-21 | End: 2018-10-10 | Stop reason: SDUPTHER

## 2018-03-21 RX ADMIN — CITALOPRAM HYDROBROMIDE 40 MG: 20 TABLET ORAL at 09:29

## 2018-03-21 RX ADMIN — PANTOPRAZOLE SODIUM 40 MG: 40 TABLET, DELAYED RELEASE ORAL at 06:21

## 2018-03-21 RX ADMIN — POTASSIUM CHLORIDE 20 MEQ: 750 CAPSULE, EXTENDED RELEASE ORAL at 09:29

## 2018-03-21 RX ADMIN — METOPROLOL TARTRATE 12.5 MG: 25 TABLET, FILM COATED ORAL at 09:28

## 2018-03-21 RX ADMIN — APIXABAN 5 MG: 5 TABLET, FILM COATED ORAL at 09:30

## 2018-03-21 RX ADMIN — CETIRIZINE HYDROCHLORIDE 10 MG: 10 TABLET, FILM COATED ORAL at 09:28

## 2018-03-21 RX ADMIN — DOFETILIDE 250 MCG: 0.25 CAPSULE ORAL at 09:29

## 2018-03-21 RX ADMIN — FUROSEMIDE 40 MG: 40 TABLET ORAL at 09:28

## 2018-03-21 RX ADMIN — METFORMIN HYDROCHLORIDE 500 MG: 500 TABLET, EXTENDED RELEASE ORAL at 09:29

## 2018-03-21 RX ADMIN — ASPIRIN 81 MG: 81 TABLET, COATED ORAL at 09:28

## 2018-03-21 NOTE — PROGRESS NOTES
Continued Stay Note  [facility]     Patient Name: Maria T Garcia  MRN: [id number]  Today's Date: 3/21/2018    Admit Date: 3/14/2018          Discharge Plan     Row Name 03/21/18 0844       Plan    Plan Home with home health    Patient/Family in Agreement with Plan yes    Plan Comments Faxed order to resume HH and DC summary to [facility] in [facility], patient's current home health provider. Notified Elvia with [facility], [phone], that patient was dischargining home today. CM will continue to follow.               Discharge Codes    No documentation.       Expected Discharge Date and Time     Expected Discharge Date Expected Discharge Time    Mar 21, 2018             Racquel Bolanos

## 2018-03-21 NOTE — DISCHARGE SUMMARY
[facility]  DISCHARGE SUMMARY    Date of Discharge:  3/21/2018    Discharge Diagnosis: Acute on chronic systolic heart failure, atrial flutter    Presenting Problem/History of Present Illness  Acute combined systolic and diastolic heart failure [I50.41]  Acute combined systolic and diastolic congestive heart failure [I50.41]  Acute combined systolic and diastolic congestive heart failure [I50.41]    Hospital Course  Patient is a 57 y.o. female presented with  remote SVT s/p abalation (IDB), recurrent SVT s/p MARY/ECV 12/2017, systolic and diastolic HF, PE on NOAC, DM, COPD, tobacco dependence, obesity who presented with a 30 pound weight gain over 2 weeks.  She was recently at [facility] for treatment of sepsis and pneumonia.  Since then she is put on about 30 pounds of fluid after being told to hold her diuretic.  Upon arrival she was found to be in atrial flutter.  Her atypical atrial flutter was being treated at this time with rate control with metoprolol.  In December 2017 was cardioverted and started on sotalol but did not tolerate it.    During this admission the patient was diuresed with IV Lasix.  Her weight decreased 30 pounds.  She is started on Tikosyn.  She chemically converted to normal rhythm on Tikosyn.      Procedures Performed         Consults:   Consults     Date and Time Order Name Status Description    3/14/2018 2041 Cardiac Electrophysiologist Inpatient Consult Completed     3/14/2018 9233 Cardiology (on-call MD unless specified)            Pertinent Test Results: EKG initially atypical flutter, now NSR, QTc 470msec upon discharge    Condition on Discharge:  Stable    Physical Exam at Discharge  Vital Signs  Temp:  [98 °F (36.7 °C)-98.9 °F (37.2 °C)] 98.2 °F (36.8 °C)  Heart Rate:  [52-65] 52  Resp:  [18-20] 18  BP: ()/(42-58) 96/45  Physical Exam:  GEN: NAD  PULM: CTAB  CV: RRR    Discharge Disposition: Home    Discharge Diet: Cardiac heart healthy  diet    Activity at Discharge: As tolerated    Follow-up Appointments  Future Appointments  Date Time Provider Department Center   4/9/2018 2:00 PM Koby Couch MD [facility] None   4/16/2018 11:00 AM Sony Toscano DO [facility] [place] None   Follow-up with PCP in 2 weeks      Additional Instructions for the Follow-ups that You Need to Schedule     Discharge Follow-up with Specialty: With Dr Singh in 6 weeks, with Dr oCuch in 12 weeks    As directed      Specialty:  With Dr Singh in 6 weeks, with Dr Couch in 12 weeks               Discharge Medications   Maria T Garcia   Home Medication Instructions JACE:[id number]    Printed on:03/21/18 0870   Medication Information                      albuterol (VENTOLIN HFA) 108 (90 Base) MCG/ACT inhaler  Inhale 2 puffs Every 6 (Six) Hours As Needed for Wheezing.             apixaban (ELIQUIS) 5 MG tablet tablet  Take 5 mg by mouth 2 (Two) Times a Day.             aspirin 81 MG EC tablet  Take 1 tablet by mouth Daily.             cetirizine (zyrTEC) 10 MG tablet  Take 10 mg by mouth Daily As Needed.             citalopram (CeleXA) 20 MG tablet  Take 20 mg by mouth Daily.             dofetilide (TIKOSYN) 250 MCG capsule  Take 1 capsule by mouth Every 12 (Twelve) Hours.             gabapentin (NEURONTIN) 300 MG capsule  Take 300 mg by mouth Every Night.             hydrOXYzine (VISTARIL) 50 MG capsule  Take 50 mg by mouth Every Morning.             metFORMIN ER (GLUCOPHAGE-XR) 500 MG 24 hr tablet  Take 500 mg by mouth Every Evening.             metoprolol tartrate (LOPRESSOR) 25 MG tablet  Take 0.5 tablets by mouth Every 12 (Twelve) Hours.             mometasone-formoterol (DULERA 200) 200-5 MCG/ACT inhaler  Inhale 2 puffs 2 (Two) Times a Day.             omeprazole (priLOSEC) 40 MG capsule  Take 40 mg by mouth Daily.             PHARMACY CONSULT  Continuous As Needed (1).             pharmacy consult - MTM  Daily.             potassium chloride (K-DUR,KLOR-CON) 20 MEQ CR  tablet  Take 20 mEq by mouth Daily.             torsemide (DEMADEX) 10 MG tablet  Take 1 tablet by mouth 2 (Two) Times a Day.                  Koby Couch MD  03/21/18  8:28 AM    Time: Discharge 15 min

## 2018-03-21 NOTE — PLAN OF CARE
Problem: Fall Risk (Adult)  Goal: Identify Related Risk Factors and Signs and Symptoms  Outcome: Ongoing (interventions implemented as appropriate)   03/14/18 2053 03/15/18 1056   Fall Risk (Adult)   Related Risk Factors (Fall Risk) --  fatigue/slow reaction;sleep pattern alteration;gait/mobility problems   Signs and Symptoms (Fall Risk) presence of risk factors --      Goal: Absence of Fall  Outcome: Ongoing (interventions implemented as appropriate)   03/19/18 0553   Fall Risk (Adult)   Absence of Fall making progress toward outcome       Problem: Patient Care Overview  Goal: Plan of Care Review  Outcome: Ongoing (interventions implemented as appropriate)   03/18/18 0417 03/20/18 0815   Coping/Psychosocial   Plan of Care Reviewed With --  patient;family   Plan of Care Review   Progress no change --    OTHER   Outcome Summary VSS, A-Fib/Flutter on monitor, on 3L NC. Heart rate was elevated throughout shift (110-150), called MD on called and recieved metoprolol orders. Since admin of medication pt's heart rate has been much improved lowering to 90's-110. Recieved first dose of tikosyn at 2100 last night. Have monitored rhythm and QTC. Will have another EKG this AM. Will continue to monitor.  --      Goal: Individualization and Mutuality  Outcome: Ongoing (interventions implemented as appropriate)    Goal: Discharge Needs Assessment  Outcome: Ongoing (interventions implemented as appropriate)   03/14/18 1859 03/14/18 2034   Discharge Needs Assessment   Readmission Within the Last 30 Days no previous admission in last 30 days --    Concerns to be Addressed no discharge needs identified;denies needs/concerns at this time --    Patient/Family Anticipates Transition to home --    Patient/Family Anticipated Services at Transition home health care  (Pt has [facility] that was initiated on 3/13/18. ) --    Transportation Anticipated family or friend will provide --    Equipment Needed After Discharge none --     Discharge Facility/Level of Care Needs home with home health  (will need to resume HH services at discharge) --    Disability   Equipment Currently Used at Home --  oxygen;walker, standard     Goal: Interprofessional Rounds/Family Conf  Outcome: Ongoing (interventions implemented as appropriate)      Problem: Cardiac: Heart Failure (Adult)  Goal: Signs and Symptoms of Listed Potential Problems Will be Absent, Minimized or Managed (Cardiac: Heart Failure)  Outcome: Ongoing (interventions implemented as appropriate)   03/20/18 1442   Goal/Outcome Evaluation   Problems Assessed (Heart Failure) all   Problems Present (Heart Failure) dysrhythmia/arrhythmia;respiratory compromise;situational response       Problem: Fluid Volume Excess (Adult)  Goal: Optimal Fluid Balance  Outcome: Ongoing (interventions implemented as appropriate)   03/21/18 0316   Fluid Volume Excess (Adult)   Optimal Fluid Balance making progress toward outcome       Problem: Skin Injury Risk (Adult)  Goal: Skin Health and Integrity  Outcome: Ongoing (interventions implemented as appropriate)   03/21/18 0316   Skin Injury Risk (Adult)   Skin Health and Integrity making progress toward outcome

## 2018-03-21 NOTE — DISCHARGE PLACEMENT REQUEST
"Maria T Garcia (57 y.o. Female)     From [place],   [phone]      Date of Birth Social Security Number Address Home Phone MRN    1960  [address] [place] [address] [phone] [id number]    Mu-ism Marital Status          Worship Single       Admission Date Admission Type Admitting Provider Attending Provider Department, Room/Bed    3/14/18 Emergency Koby Couch MD Shih, MD [facility] 3E, S338/1    Discharge Date Discharge Disposition Discharge Destination         Home or Self Care              Attending Provider:  Koby Couch MD    Allergies:  Codeine, Morphine And Related, Naproxen    Isolation:  None   Infection:  None   Code Status:  FULL    Ht:  157.5 cm (62\")   Wt:  109 kg (240 lb 6.4 oz)    Admission Cmt:  None   Principal Problem:  None                Active Insurance as of 3/14/2018     Primary Coverage     Payor Plan Insurance Group Employer/Plan Group    MEDICARE MEDICARE A & B      Payor Plan Address Payor Plan Phone Number Effective From Effective To    [address] [phone] 2017     [place] [address]       Subscriber Name Subscriber Birth Date Member ID       MARIA T GARCIA 1960 [id number]                 Emergency Contacts      (Rel.) Home Phone Work Phone Mobile Phone    Sophia Garcia (Daughter) [phone] -- --        [address] [address]  Phone:  [phone]  Fax:  [phone]        Patient:     Maria T Garcia MRN:  [id number]   [address] [place] [address] :  1960  SSN:    Phone: [phone] Sex:  F      INSURANCE PAYOR PLAN GROUP # SUBSCRIBER ID   Primary: MEDICARE [id number]   [id number]   Admitting Diagnosis: Acute combined systolic and diastolic heart failure [I50.41]  Order Date:  Mar 20, 2018         Inpatient Case Management  Consult       (Order ID: [id number])     Diagnosis:    "      Priority:  Routine Expected Date:   Expiration Date:        Interval:   Count:    Reason for Consult? Resume home health at discharge     Specimen Type:   Specimen Source:   Specimen Taken Date:   Specimen Taken Time:                   Verbal Order Mode: Per protocol: cosign required  Authorizing Provider: Koby Couch MD  Authorizing Provider's NPI: [id number]     Order Entered By: Racquel Bolanos 3/20/2018 11:54 AM     Electronically signed by:                Discharge Summary      Koby Couch MD at 3/21/2018  8:28 AM            [facility]  DISCHARGE SUMMARY    Date of Discharge:  3/21/2018    Discharge Diagnosis: Acute on chronic systolic heart failure, atrial flutter    Presenting Problem/History of Present Illness  Acute combined systolic and diastolic heart failure [I50.41]  Acute combined systolic and diastolic congestive heart failure [I50.41]  Acute combined systolic and diastolic congestive heart failure [I50.41]    Hospital Course  Patient is a 57 y.o. female presented with  remote SVT s/p abalation (IDB), recurrent SVT s/p MARY/ECV 12/2017, systolic and diastolic HF, PE on NOAC, DM, COPD, tobacco dependence, obesity who presented with a 30 pound weight gain over 2 weeks.  She was recently at [place] for treatment of sepsis and pneumonia.  Since then she is put on about 30 pounds of fluid after being told to hold her diuretic.  Upon arrival she was found to be in atrial flutter.  Her atypical atrial flutter was being treated at this time with rate control with metoprolol.  In December 2017 was cardioverted and started on sotalol but did not tolerate it.    During this admission the patient was diuresed with IV Lasix.  Her weight decreased 30 pounds.  She is started on Tikosyn.  She chemically converted to normal rhythm on Tikosyn.      Procedures Performed         Consults:   Consults     Date and Time Order Name Status Description    3/14/2018 2041 Cardiac  Electrophysiologist Inpatient Consult Completed     3/14/2018 1434 Cardiology (on-call MD unless specified)            Pertinent Test Results: EKG initially atypical flutter, now NSR, QTc 470msec upon discharge    Condition on Discharge:  Stable    Physical Exam at Discharge  Vital Signs  Temp:  [98 °F (36.7 °C)-98.9 °F (37.2 °C)] 98.2 °F (36.8 °C)  Heart Rate:  [52-65] 52  Resp:  [18-20] 18  BP: ()/(42-58) 96/45  Physical Exam:  GEN: NAD  PULM: CTAB  CV: RRR    Discharge Disposition: Home    Discharge Diet: Cardiac heart healthy diet    Activity at Discharge: As tolerated    Follow-up Appointments  Future Appointments  Date Time Provider Department Center   4/9/2018 2:00 PM Koby Couch MD [facility] [place] None   4/16/2018 11:00 AM Sony Toscano DO [facility] [place] None   Follow-up with PCP in 2 weeks      Additional Instructions for the Follow-ups that You Need to Schedule     Discharge Follow-up with Specialty: With Dr Toscano/ANGELY in 6 weeks, with Dr Couch in 12 weeks    As directed      Specialty:  With Dr Toscano/ANGELY in 6 weeks, with Dr Couch in 12 weeks               Discharge Medications   Maria T Garcia   Home Medication Instructions JACE:[id number]    Printed on:03/21/18 0828   Medication Information                      albuterol (VENTOLIN HFA) 108 (90 Base) MCG/ACT inhaler  Inhale 2 puffs Every 6 (Six) Hours As Needed for Wheezing.             apixaban (ELIQUIS) 5 MG tablet tablet  Take 5 mg by mouth 2 (Two) Times a Day.             aspirin 81 MG EC tablet  Take 1 tablet by mouth Daily.             cetirizine (zyrTEC) 10 MG tablet  Take 10 mg by mouth Daily As Needed.             citalopram (CeleXA) 20 MG tablet  Take 20 mg by mouth Daily.             dofetilide (TIKOSYN) 250 MCG capsule  Take 1 capsule by mouth Every 12 (Twelve) Hours.             gabapentin (NEURONTIN) 300 MG capsule  Take 300 mg by mouth Every Night.             hydrOXYzine (VISTARIL) 50 MG capsule  Take 50 mg by mouth Every Morning.              metFORMIN ER (GLUCOPHAGE-XR) 500 MG 24 hr tablet  Take 500 mg by mouth Every Evening.             metoprolol tartrate (LOPRESSOR) 25 MG tablet  Take 0.5 tablets by mouth Every 12 (Twelve) Hours.             mometasone-formoterol (DULERA 200) 200-5 MCG/ACT inhaler  Inhale 2 puffs 2 (Two) Times a Day.             omeprazole (priLOSEC) 40 MG capsule  Take 40 mg by mouth Daily.             PHARMACY CONSULT  Continuous As Needed (1).             pharmacy consult - MTM  Daily.             potassium chloride (K-DUR,KLOR-CON) 20 MEQ CR tablet  Take 20 mEq by mouth Daily.             torsemide (DEMADEX) 10 MG tablet  Take 1 tablet by mouth 2 (Two) Times a Day.                  Koby Couch MD  03/21/18  8:28 AM    Time: Discharge 15 min            Electronically signed by Koby Couch MD at 3/21/2018  8:31 AM       Discharge Order     Start     Ordered    03/21/18 0805  Discharge patient  Once     Expected Discharge Date:  03/21/18    Discharge Disposition:  Home or Self Care        03/21/18 0805

## 2018-03-23 ENCOUNTER — TELEPHONE (OUTPATIENT)
Dept: CALL CENTER | Facility: HOSPITAL | Age: 58
End: 2018-03-23

## 2018-03-23 NOTE — TELEPHONE ENCOUNTER
Called pt for a Day 3 HF follow-up call. Pt reports that she can't afford a pr of scales until she gets paid again. Pt is trying to measure her legs/abd with a belt daily to monitor for fluid retention, per her report.   I called [facility], who pt has HH with, they do not have an indigent program to assist pts to get scales. I called Janny ROMERO for any assistance in obtaining the pt a pr of scales.

## 2018-05-21 ENCOUNTER — OFFICE VISIT (OUTPATIENT)
Dept: CARDIOLOGY | Facility: CLINIC | Age: 58
End: 2018-05-21

## 2018-05-21 VITALS
HEIGHT: 62 IN | BODY MASS INDEX: 47.84 KG/M2 | WEIGHT: 260 LBS | SYSTOLIC BLOOD PRESSURE: 118 MMHG | DIASTOLIC BLOOD PRESSURE: 74 MMHG | HEART RATE: 78 BPM

## 2018-05-21 DIAGNOSIS — I48.91 ATRIAL FIBRILLATION WITH RAPID VENTRICULAR RESPONSE (HCC): ICD-10-CM

## 2018-05-21 DIAGNOSIS — I50.41 ACUTE COMBINED SYSTOLIC AND DIASTOLIC HEART FAILURE (HCC): ICD-10-CM

## 2018-05-21 DIAGNOSIS — I47.1 SUSTAINED SVT (HCC): Primary | ICD-10-CM

## 2018-05-21 DIAGNOSIS — J43.8 OTHER EMPHYSEMA (HCC): ICD-10-CM

## 2018-05-21 PROCEDURE — 99213 OFFICE O/P EST LOW 20 MIN: CPT | Performed by: INTERNAL MEDICINE

## 2018-05-21 PROCEDURE — 93000 ELECTROCARDIOGRAM COMPLETE: CPT | Performed by: INTERNAL MEDICINE

## 2018-05-21 NOTE — PROGRESS NOTES
Subjective:   Maria T Garcia  1960  [phone]      05/21/2018    [facility] CARDIOLOGY    Jessica Portillo  [address] [address]    REFERRING DOCTOR: Koby Couch    Patient ID: Maria T Garcia is a 58 y.o. female.    Chief Complaint:   Chief Complaint   Patient presents with   • Congestive Heart Failure     Problem List:  1) Acute on chronic systolic heart failure superimposed diastolic heart failure   - EF 46-50%   - Weight gain of 27 lbs over 2 weeks; weight in , now 267 lbs  - No I&O data  -    2) Atrial flutter (atypical) / Atrial tachycardia 2: 1 with CL of about 290-300 msec.   - Remote h/o SVT with ablation- data deficit  - s/p MARY/ECV December 2017  - Now with recurrence with 2:1 conduction on a low-dose sotalol 40 mg twice a day due to lower heart rates and now inability increase due to lower blood pressures on previous hospital changed to Tikosyn  - 3/2018 Tikosyn admission   3) COPD on Home O2 at 3L     Allergies   Allergen Reactions   • Codeine Shortness Of Breath   • Morphine And Related Shortness Of Breath   • Naproxen Hives       Current Outpatient Prescriptions:   •  albuterol (VENTOLIN HFA) 108 (90 Base) MCG/ACT inhaler, Inhale 2 puffs Every 6 (Six) Hours As Needed for Wheezing., Disp: , Rfl:   •  apixaban (ELIQUIS) 5 MG tablet tablet, Take 5 mg by mouth 2 (Two) Times a Day., Disp: , Rfl:   •  aspirin 81 MG EC tablet, Take 1 tablet by mouth Daily., Disp: 30 tablet, Rfl: 5  •  cetirizine (zyrTEC) 10 MG tablet, Take 10 mg by mouth Daily., Disp: , Rfl:   •  citalopram (CeleXA) 20 MG tablet, Take 20 mg by mouth Daily., Disp: , Rfl:   •  dofetilide (TIKOSYN) 250 MCG capsule, Take 1 capsule by mouth Every 12 (Twelve) Hours., Disp: 60 capsule, Rfl: 5  •  gabapentin (NEURONTIN) 300 MG capsule, Take 300 mg by mouth Every Night., Disp: , Rfl:   •  hydrOXYzine (VISTARIL) 50 MG capsule, Take 50 mg by mouth Every Morning., Disp: , Rfl:   •   metFORMIN ER (GLUCOPHAGE-XR) 500 MG 24 hr tablet, Take 500 mg by mouth Every Evening., Disp: , Rfl:   •  metoprolol tartrate (LOPRESSOR) 25 MG tablet, Take 0.5 tablets by mouth Every 12 (Twelve) Hours., Disp: 30 tablet, Rfl: 11  •  mometasone-formoterol (DULERA 200) 200-5 MCG/ACT inhaler, Inhale 2 puffs 2 (Two) Times a Day., Disp: , Rfl:   •  omeprazole (priLOSEC) 40 MG capsule, Take 40 mg by mouth Daily., Disp: , Rfl:   •  pharmacy consult - MTM, Daily., Disp: 1 each, Rfl: 0  •  PHARMACY CONSULT, Continuous As Needed (1)., Disp: 1 each, Rfl: 0  •  potassium chloride (K-DUR,KLOR-CON) 20 MEQ CR tablet, Take 20 mEq by mouth Daily., Disp: , Rfl:   •  torsemide (DEMADEX) 10 MG tablet, Take 1 tablet by mouth 2 (Two) Times a Day., Disp: 60 tablet, Rfl: 11    History of Present Illness  Patient is a 58-year-old female with a history of previous atrial arrhythmias who is failed sotalol now on dofetilide here for follow-up visit after hospitalization in March 2018. Doing well since hospital stay with no recurrences of Afib that she can tell. Rates at times up to 90-110s but with activity and right back down after cooling down. In a wheelchair. Dealing with a lot of stress with a ex. Dealing with some LE edema and abd bloating    No issues with chest pain, shortness of breath, fevers, chills, night sweats, PND, orthopnea. No recent ER visits or hospital stays.      The following portions of the patient's history were reviewed and updated as appropriate: allergies, current medications, past family history, past medical history, past social history, past surgical history and problem list.    ROS   14 point ROS negative except as outlined in problem list, HPI and other parts of the note.      ECG 12 Lead  Date/Time: 5/21/2018 3:45 PM  Performed by: LINN OLEA  Authorized by: LINN OLEA   Rhythm: sinus rhythm  Comments: QTc about 460 msec.                Objective:       Vitals:    05/21/18 1528   BP: 118/74   BP  "Location: Left arm   Patient Position: Sitting   Pulse: 78   Weight: 118 kg (260 lb)   Height: 157.5 cm (62\")       GENERAL: Well-developed, well-nourished patient in no acute distress.  HEENT: Normocephalic, atraumatic, PERRLA. Moist mucous membranes.  NECK: No JVD present at 30°. No carotid bruits auscultated.  LUNGS: Clear to auscultation.  CARDIOVASCULAR: Heart has a regular rate and rhythm. No murmurs, gallops or rubs noted.   ABDOMEN: Soft, nontender. Positive bowel sounds.  MUSCULOSKELETAL: No gross deformities. No clubbing, cyanosis, or lower extremity edema.  SKIN: Pink, warm  Neuro: Nonfocal exam. Gait intact  Ext: + edema    The patient's old records including ambulatory rhythm recordings (ECGs, Holter/event monitor) were reviewed and discussed.      Lab Review:   Results for orders placed or performed during the hospital encounter of 03/14/18   Comprehensive Metabolic Panel   Result Value Ref Range    Glucose 98 70 - 100 mg/dL    BUN 12 9 - 23 mg/dL    Creatinine 0.80 0.60 - 1.30 mg/dL    Sodium 139 132 - 146 mmol/L    Potassium 3.7 3.5 - 5.5 mmol/L    Chloride 96 (L) 99 - 109 mmol/L    CO2 38.0 (H) 20.0 - 31.0 mmol/L    Calcium 8.8 8.7 - 10.4 mg/dL    Total Protein 7.4 5.7 - 8.2 g/dL    Albumin 3.70 3.20 - 4.80 g/dL    ALT (SGPT) 192 (H) 7 - 40 U/L    AST (SGOT) 41 (H) 0 - 33 U/L    Alkaline Phosphatase 106 (H) 25 - 100 U/L    Total Bilirubin 1.0 0.3 - 1.2 mg/dL    eGFR Non African Amer 74 >60 mL/min/1.73    Globulin 3.7 gm/dL    A/G Ratio 1.0 (L) 1.5 - 2.5 g/dL    BUN/Creatinine Ratio 15.0 7.0 - 25.0    Anion Gap 5.0 3.0 - 11.0 mmol/L   BNP   Result Value Ref Range    .0 (H) 0.0 - 100.0 pg/mL   CBC Auto Differential   Result Value Ref Range    WBC 12.19 (H) 3.50 - 10.80 10*3/mm3    RBC 4.47 3.89 - 5.14 10*6/mm3    Hemoglobin 13.8 11.5 - 15.5 g/dL    Hematocrit 45.1 (H) 34.5 - 44.0 %    .9 (H) 80.0 - 99.0 fL    MCH 30.9 27.0 - 31.0 pg    MCHC 30.6 (L) 32.0 - 36.0 g/dL    RDW 15.9 (H) 11.3 " - 14.5 %    RDW-SD 57.5 (H) 37.0 - 54.0 fl    MPV 11.4 6.0 - 12.0 fL    Platelets 192 150 - 450 10*3/mm3    Neutrophil % 80.9 (H) 41.0 - 71.0 %    Lymphocyte % 8.6 (L) 24.0 - 44.0 %    Monocyte % 8.7 0.0 - 12.0 %    Eosinophil % 1.1 0.0 - 3.0 %    Basophil % 0.2 0.0 - 1.0 %    Immature Grans % 0.5 0.0 - 0.6 %    Neutrophils, Absolute 9.87 (H) 1.50 - 8.30 10*3/mm3    Lymphocytes, Absolute 1.05 0.60 - 4.80 10*3/mm3    Monocytes, Absolute 1.06 (H) 0.00 - 1.00 10*3/mm3    Eosinophils, Absolute 0.13 0.00 - 0.30 10*3/mm3    Basophils, Absolute 0.02 0.00 - 0.20 10*3/mm3    Immature Grans, Absolute 0.06 (H) 0.00 - 0.03 10*3/mm3   Basic Metabolic Panel   Result Value Ref Range    Glucose 89 70 - 100 mg/dL    BUN 11 9 - 23 mg/dL    Creatinine 0.70 0.60 - 1.30 mg/dL    Sodium 138 132 - 146 mmol/L    Potassium 3.5 3.5 - 5.5 mmol/L    Chloride 93 (L) 99 - 109 mmol/L    CO2 40.0 (H) 20.0 - 31.0 mmol/L    Calcium 9.1 8.7 - 10.4 mg/dL    eGFR Non African Amer 87 >60 mL/min/1.73    BUN/Creatinine Ratio 15.7 7.0 - 25.0    Anion Gap 5.0 3.0 - 11.0 mmol/L   Hemoglobin A1c   Result Value Ref Range    Hemoglobin A1C 6.60 (H) 4.80 - 5.60 %   Basic Metabolic Panel   Result Value Ref Range    Glucose 92 70 - 100 mg/dL    BUN 11 9 - 23 mg/dL    Creatinine 0.70 0.60 - 1.30 mg/dL    Sodium 140 132 - 146 mmol/L    Potassium 3.4 (L) 3.5 - 5.5 mmol/L    Chloride 94 (L) 99 - 109 mmol/L    CO2 40.0 (H) 20.0 - 31.0 mmol/L    Calcium 8.2 (L) 8.7 - 10.4 mg/dL    eGFR Non African Amer 87 >60 mL/min/1.73    BUN/Creatinine Ratio 15.7 7.0 - 25.0    Anion Gap 6.0 3.0 - 11.0 mmol/L   Potassium   Result Value Ref Range    Potassium 3.9 3.5 - 5.5 mmol/L   Basic Metabolic Panel   Result Value Ref Range    Glucose 107 (H) 70 - 100 mg/dL    BUN 11 9 - 23 mg/dL    Creatinine 0.70 0.60 - 1.30 mg/dL    Sodium 137 132 - 146 mmol/L    Potassium 3.6 3.5 - 5.5 mmol/L    Chloride 89 (L) 99 - 109 mmol/L    CO2 39.0 (H) 20.0 - 31.0 mmol/L    Calcium 8.3 (L) 8.7 - 10.4  mg/dL    eGFR Non African Amer 87 >60 mL/min/1.73    BUN/Creatinine Ratio 15.7 7.0 - 25.0    Anion Gap 9.0 3.0 - 11.0 mmol/L   Magnesium   Result Value Ref Range    Magnesium 1.5 1.3 - 2.7 mg/dL   Basic Metabolic Panel   Result Value Ref Range    Glucose 111 (H) 70 - 100 mg/dL    BUN 11 9 - 23 mg/dL    Creatinine 0.70 0.60 - 1.30 mg/dL    Sodium 137 132 - 146 mmol/L    Potassium 3.7 3.5 - 5.5 mmol/L    Chloride 89 (L) 99 - 109 mmol/L    CO2 39.0 (H) 20.0 - 31.0 mmol/L    Calcium 8.8 8.7 - 10.4 mg/dL    eGFR Non African Amer 87 >60 mL/min/1.73    BUN/Creatinine Ratio 15.7 7.0 - 25.0    Anion Gap 9.0 3.0 - 11.0 mmol/L   Calcium, Ionized   Result Value Ref Range    Ionized Calcium 1.17 1.12 - 1.32 mmol/L   Magnesium   Result Value Ref Range    Magnesium 2.5 1.3 - 2.7 mg/dL   Basic Metabolic Panel   Result Value Ref Range    Glucose 144 (H) 70 - 100 mg/dL    BUN 11 9 - 23 mg/dL    Creatinine 0.80 0.60 - 1.30 mg/dL    Sodium 135 132 - 146 mmol/L    Potassium 3.7 3.5 - 5.5 mmol/L    Chloride 90 (L) 99 - 109 mmol/L    CO2 39.0 (H) 20.0 - 31.0 mmol/L    Calcium 8.7 8.7 - 10.4 mg/dL    eGFR Non African Amer 74 >60 mL/min/1.73    BUN/Creatinine Ratio 13.8 7.0 - 25.0    Anion Gap 6.0 3.0 - 11.0 mmol/L   Magnesium   Result Value Ref Range    Magnesium 2.4 1.3 - 2.7 mg/dL   Basic Metabolic Panel   Result Value Ref Range    Glucose 97 70 - 100 mg/dL    BUN 13 9 - 23 mg/dL    Creatinine 0.70 0.60 - 1.30 mg/dL    Sodium 138 132 - 146 mmol/L    Potassium 3.5 3.5 - 5.5 mmol/L    Chloride 90 (L) 99 - 109 mmol/L    CO2 38.0 (H) 20.0 - 31.0 mmol/L    Calcium 8.6 (L) 8.7 - 10.4 mg/dL    eGFR Non African Amer 87 >60 mL/min/1.73    BUN/Creatinine Ratio 18.6 7.0 - 25.0    Anion Gap 10.0 3.0 - 11.0 mmol/L   Magnesium   Result Value Ref Range    Magnesium 2.1 1.3 - 2.7 mg/dL   Basic Metabolic Panel   Result Value Ref Range    Glucose 91 70 - 100 mg/dL    BUN 13 9 - 23 mg/dL    Creatinine 0.80 0.60 - 1.30 mg/dL    Sodium 137 132 - 146 mmol/L     Potassium 4.2 3.5 - 5.5 mmol/L    Chloride 93 (L) 99 - 109 mmol/L    CO2 38.0 (H) 20.0 - 31.0 mmol/L    Calcium 8.9 8.7 - 10.4 mg/dL    eGFR Non African Amer 74 >60 mL/min/1.73    BUN/Creatinine Ratio 16.3 7.0 - 25.0    Anion Gap 6.0 3.0 - 11.0 mmol/L   Magnesium   Result Value Ref Range    Magnesium 2.1 1.3 - 2.7 mg/dL   Basic Metabolic Panel   Result Value Ref Range    Glucose 132 (H) 70 - 100 mg/dL    BUN 16 9 - 23 mg/dL    Creatinine 1.00 0.60 - 1.30 mg/dL    Sodium 134 132 - 146 mmol/L    Potassium 3.9 3.5 - 5.5 mmol/L    Chloride 90 (L) 99 - 109 mmol/L    CO2 39.0 (H) 20.0 - 31.0 mmol/L    Calcium 8.8 8.7 - 10.4 mg/dL    eGFR Non African Amer 57 (L) >60 mL/min/1.73    BUN/Creatinine Ratio 16.0 7.0 - 25.0    Anion Gap 5.0 3.0 - 11.0 mmol/L   Basic Metabolic Panel   Result Value Ref Range    Glucose 100 70 - 100 mg/dL    BUN 17 9 - 23 mg/dL    Creatinine 1.00 0.60 - 1.30 mg/dL    Sodium 138 132 - 146 mmol/L    Potassium 4.5 3.5 - 5.5 mmol/L    Chloride 91 (L) 99 - 109 mmol/L    CO2 42.0 (C) 20.0 - 31.0 mmol/L    Calcium 9.8 8.7 - 10.4 mg/dL    eGFR Non African Amer 57 (L) >60 mL/min/1.73    BUN/Creatinine Ratio 17.0 7.0 - 25.0    Anion Gap 5.0 3.0 - 11.0 mmol/L   POC Troponin, Rapid   Result Value Ref Range    Troponin I 0.00 0.00 - 0.07 ng/mL   POC Troponin, Rapid   Result Value Ref Range    Troponin I 0.01 0.00 - 0.07 ng/mL   POC Glucose Once   Result Value Ref Range    Glucose 88 70 - 130 mg/dL   POC Glucose Once   Result Value Ref Range    Glucose 93 70 - 130 mg/dL   POC Glucose Once   Result Value Ref Range    Glucose 132 (H) 70 - 130 mg/dL   POC Glucose Once   Result Value Ref Range    Glucose 127 70 - 130 mg/dL   POC Glucose Once   Result Value Ref Range    Glucose 140 (H) 70 - 130 mg/dL   POC Glucose Once   Result Value Ref Range    Glucose 116 70 - 130 mg/dL   POC Glucose Once   Result Value Ref Range    Glucose 125 70 - 130 mg/dL   POC Glucose Once   Result Value Ref Range    Glucose 109 70 - 130  mg/dL   POC Glucose Once   Result Value Ref Range    Glucose 133 (H) 70 - 130 mg/dL   POC Glucose Once   Result Value Ref Range    Glucose 149 (H) 70 - 130 mg/dL   POC Glucose Once   Result Value Ref Range    Glucose 138 (H) 70 - 130 mg/dL   POC Glucose Once   Result Value Ref Range    Glucose 102 70 - 130 mg/dL   POC Glucose Once   Result Value Ref Range    Glucose 127 70 - 130 mg/dL   POC Glucose Once   Result Value Ref Range    Glucose 126 70 - 130 mg/dL   POC Glucose Once   Result Value Ref Range    Glucose 113 70 - 130 mg/dL   POC Glucose Once   Result Value Ref Range    Glucose 110 70 - 130 mg/dL   POC Glucose Once   Result Value Ref Range    Glucose 125 70 - 130 mg/dL   POC Glucose Once   Result Value Ref Range    Glucose 118 70 - 130 mg/dL   POC Glucose Once   Result Value Ref Range    Glucose 117 70 - 130 mg/dL   POC Glucose Once   Result Value Ref Range    Glucose 115 70 - 130 mg/dL   POC Glucose Once   Result Value Ref Range    Glucose 129 70 - 130 mg/dL   POC Glucose Once   Result Value Ref Range    Glucose 129 70 - 130 mg/dL   POC Glucose Once   Result Value Ref Range    Glucose 179 (H) 70 - 130 mg/dL   POC Glucose Once   Result Value Ref Range    Glucose 96 70 - 130 mg/dL   Light Blue Top   Result Value Ref Range    Extra Tube hold for add-on    Green Top (Gel)   Result Value Ref Range    Extra Tube Hold for add-ons.    Lavender Top   Result Value Ref Range    Extra Tube hold for add-on    Gold Top - SST   Result Value Ref Range    Extra Tube Hold for add-ons.            Diagnosis:   1. Sustained SVT  2. COPD  3. Atrial fibrillation with rapid ventricular response with Aflutter  4. Acute combined systolic and diastolic heart failure      Assessment & Plan:   1. Atrial Flutter, Atach 2:1 with history of Afib with initiation of Tikosyn 250 mcg, Lopressor and NOAC. ECV in March 2018. No recurrences and doing well. QTc ok. Failed Sotalol due to recurrent atrial arrhythmias.   2. COPD on 3 L O2  3. Systolic  and Diastolic HF with some abd bloating. Increase Torsemide to 20 mg BID for one week then back to 10 mg BID. Continue K+. Last EF 46-50%  4. Follow up with Dr Couch in July and follow up with me in 6 mths.          CC: Koby Toscano,   05/21/18  3:41 PM      EMR Dragon/Transcription disclaimer:  Much of this encounter note is an electronic transcription/translation of spoken language to printed text. Electronic translation of spoken language may permit erroneous, or at times, nonsensical words or phrases to be inadvertently transcribed. Although I have reviewed the note for such errors, some may still exist.

## 2018-06-26 ENCOUNTER — TELEPHONE (OUTPATIENT)
Dept: CARDIOLOGY | Facility: CLINIC | Age: 58
End: 2018-06-26

## 2018-06-26 NOTE — TELEPHONE ENCOUNTER
I would send to Dr Sam and see what he thinks   That's her primary cardiologist and was just trying to increase for a short term and have him follow It    Thanks    [place]

## 2018-06-26 NOTE — TELEPHONE ENCOUNTER
Neo Toscano I messaged Md. Couch about the patients Torsemide 10 mg BID. But he is out of office and wont be back till 7/1/18. The patient states that she is out and needs a refill. Thank you

## 2018-06-26 NOTE — TELEPHONE ENCOUNTER
Patient called for Torsemide 20 mg BID refill, patients note states that she was only supposed to take torsemide 20 mg BID for a week then back it down to 10 mg BID (her previous dose) patient states that she has been taking 20mg BID for about a month now and didn't back it down because it was helping her. DO you want patient to take 20 mg BID or 10 mg BID? Thank you

## 2018-06-27 ENCOUNTER — TELEPHONE (OUTPATIENT)
Dept: CARDIOLOGY | Facility: CLINIC | Age: 58
End: 2018-06-27

## 2018-06-27 NOTE — TELEPHONE ENCOUNTER
Patient called back to check on Torsemide BID, MD Couch still out of office. Transferred call to MD Couch nurse.

## 2018-06-27 NOTE — TELEPHONE ENCOUNTER
The patient called stating that she would like a return call regarding her torsemide, however, upon calling her back three times a message stating that the phone is no longer working is announced.  There is no other number listed in her chart.  I will await her return call.

## 2018-06-29 ENCOUNTER — TELEPHONE (OUTPATIENT)
Dept: CARDIOLOGY | Facility: CLINIC | Age: 58
End: 2018-06-29

## 2018-06-29 NOTE — TELEPHONE ENCOUNTER
Received VM from patient requesting call back regarding torsemide.  Attempted multiple times but message stating number is not a working number.  Checked demographics, no other number listed.  Will await her return call.

## 2018-07-03 ENCOUNTER — TELEPHONE (OUTPATIENT)
Dept: CARDIOLOGY | Facility: CLINIC | Age: 58
End: 2018-07-03

## 2018-07-19 ENCOUNTER — TELEPHONE (OUTPATIENT)
Dept: CARDIOLOGY | Facility: CLINIC | Age: 58
End: 2018-07-19

## 2018-07-19 NOTE — TELEPHONE ENCOUNTER
Patient called and left a message. I tried to call her back but the number she left is not a working number. There are no other numbers listed to call.

## 2018-07-20 RX ORDER — TORSEMIDE 20 MG/1
20 TABLET ORAL EVERY 12 HOURS
Qty: 60 TABLET | Refills: 1 | Status: SHIPPED | OUTPATIENT
Start: 2018-07-20 | End: 2018-10-10 | Stop reason: SDUPTHER

## 2018-07-20 NOTE — TELEPHONE ENCOUNTER
I spoke with patient.  She is aware of the long term increase in Torsemide 20mg BID.  She states she spoke with someone last week.  Patient requesting refill.  This has been sent in.

## 2018-07-30 ENCOUNTER — HOSPITAL ENCOUNTER (INPATIENT)
Facility: HOSPITAL | Age: 58
LOS: 3 days | Discharge: HOME HEALTH | End: 2018-08-04
Attending: EMERGENCY MEDICINE | Admitting: ORTHOPAEDIC SURGERY

## 2018-07-30 ENCOUNTER — APPOINTMENT (OUTPATIENT)
Dept: GENERAL RADIOLOGY | Facility: HOSPITAL | Age: 58
End: 2018-07-30

## 2018-07-30 DIAGNOSIS — S82.852A CLOSED TRIMALLEOLAR FRACTURE OF LEFT ANKLE, INITIAL ENCOUNTER: Primary | ICD-10-CM

## 2018-07-30 DIAGNOSIS — Z74.09 IMPAIRED FUNCTIONAL MOBILITY, BALANCE, GAIT, AND ENDURANCE: ICD-10-CM
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
## 2018-07-30 DIAGNOSIS — Z78.9 IMPAIRED MOBILITY AND ADLS: ICD-10-CM

## 2018-07-30 DIAGNOSIS — Z74.09 IMPAIRED MOBILITY AND ADLS: ICD-10-CM

## 2018-07-30 LAB
ALBUMIN SERPL-MCNC: 4.03 G/DL (ref 3.2–4.8)
ALBUMIN/GLOB SERPL: 1 G/DL (ref 1.5–2.5)
ALP SERPL-CCNC: 140 U/L (ref 25–100)
ALT SERPL W P-5'-P-CCNC: 21 U/L (ref 7–40)
ANION GAP SERPL CALCULATED.3IONS-SCNC: 6 MMOL/L (ref 3–11)
AST SERPL-CCNC: 20 U/L (ref 0–33)
BACTERIA UR QL AUTO: ABNORMAL /HPF
BASOPHILS # BLD AUTO: 0.04 10*3/MM3 (ref 0–0.2)
BASOPHILS NFR BLD AUTO: 0.2 % (ref 0–1)
BILIRUB SERPL-MCNC: 0.5 MG/DL (ref 0.3–1.2)
BILIRUB UR QL STRIP: NEGATIVE
BNP SERPL-MCNC: 18 PG/ML (ref 0–100)
BUN BLD-MCNC: 18 MG/DL (ref 9–23)
BUN/CREAT SERPL: 15.4 (ref 7–25)
CALCIUM SPEC-SCNC: 9.5 MG/DL (ref 8.7–10.4)
CHLORIDE SERPL-SCNC: 97 MMOL/L (ref 99–109)
CLARITY UR: CLEAR
CO2 SERPL-SCNC: 35 MMOL/L (ref 20–31)
COLOR UR: YELLOW
CREAT BLD-MCNC: 1.17 MG/DL (ref 0.6–1.3)
DEPRECATED RDW RBC AUTO: 47.6 FL (ref 37–54)
EOSINOPHIL # BLD AUTO: 0.16 10*3/MM3 (ref 0–0.3)
EOSINOPHIL NFR BLD AUTO: 0.9 % (ref 0–3)
ERYTHROCYTE [DISTWIDTH] IN BLOOD BY AUTOMATED COUNT: 13.5 % (ref 11.3–14.5)
GFR SERPL CREATININE-BSD FRML MDRD: 48 ML/MIN/1.73
GLOBULIN UR ELPH-MCNC: 4 GM/DL
GLUCOSE BLD-MCNC: 157 MG/DL (ref 70–100)
GLUCOSE UR STRIP-MCNC: NEGATIVE MG/DL
HCT VFR BLD AUTO: 48.4 % (ref 34.5–44)
HGB BLD-MCNC: 15.7 G/DL (ref 11.5–15.5)
HGB UR QL STRIP.AUTO: NEGATIVE
HOLD SPECIMEN: NORMAL
HOLD SPECIMEN: NORMAL
HYALINE CASTS UR QL AUTO: ABNORMAL /LPF
IMM GRANULOCYTES # BLD: 0.1 10*3/MM3 (ref 0–0.03)
IMM GRANULOCYTES NFR BLD: 0.6 % (ref 0–0.6)
KETONES UR QL STRIP: NEGATIVE
LEUKOCYTE ESTERASE UR QL STRIP.AUTO: ABNORMAL
LIPASE SERPL-CCNC: 39 U/L (ref 6–51)
LYMPHOCYTES # BLD AUTO: 1.55 10*3/MM3 (ref 0.6–4.8)
LYMPHOCYTES NFR BLD AUTO: 8.5 % (ref 24–44)
MCH RBC QN AUTO: 31.3 PG (ref 27–31)
MCHC RBC AUTO-ENTMCNC: 32.4 G/DL (ref 32–36)
MCV RBC AUTO: 96.4 FL (ref 80–99)
MONOCYTES # BLD AUTO: 0.98 10*3/MM3 (ref 0–1)
MONOCYTES NFR BLD AUTO: 5.4 % (ref 0–12)
NEUTROPHILS # BLD AUTO: 15.4 10*3/MM3 (ref 1.5–8.3)
NEUTROPHILS NFR BLD AUTO: 85 % (ref 41–71)
NITRITE UR QL STRIP: NEGATIVE
PH UR STRIP.AUTO: 5.5 [PH] (ref 5–8)
PLATELET # BLD AUTO: 256 10*3/MM3 (ref 150–450)
PMV BLD AUTO: 11.1 FL (ref 6–12)
POTASSIUM BLD-SCNC: 3.8 MMOL/L (ref 3.5–5.5)
PROT SERPL-MCNC: 8 G/DL (ref 5.7–8.2)
PROT UR QL STRIP: NEGATIVE
RBC # BLD AUTO: 5.02 10*6/MM3 (ref 3.89–5.14)
RBC # UR: ABNORMAL /HPF
REF LAB TEST METHOD: ABNORMAL
SODIUM BLD-SCNC: 138 MMOL/L (ref 132–146)
SP GR UR STRIP: 1.01 (ref 1–1.03)
SQUAMOUS #/AREA URNS HPF: ABNORMAL /HPF
TROPONIN I SERPL-MCNC: 0 NG/ML (ref 0–0.07)
UROBILINOGEN UR QL STRIP: ABNORMAL
WBC NRBC COR # BLD: 18.13 10*3/MM3 (ref 3.5–10.8)
WBC UR QL AUTO: ABNORMAL /HPF
WHOLE BLOOD HOLD SPECIMEN: NORMAL
WHOLE BLOOD HOLD SPECIMEN: NORMAL

## 2018-07-30 PROCEDURE — 83880 ASSAY OF NATRIURETIC PEPTIDE: CPT | Performed by: EMERGENCY MEDICINE

## 2018-07-30 PROCEDURE — 99285 EMERGENCY DEPT VISIT HI MDM: CPT

## 2018-07-30 PROCEDURE — 85025 COMPLETE CBC W/AUTO DIFF WBC: CPT

## 2018-07-30 PROCEDURE — 73610 X-RAY EXAM OF ANKLE: CPT

## 2018-07-30 PROCEDURE — 93005 ELECTROCARDIOGRAM TRACING: CPT

## 2018-07-30 PROCEDURE — 84484 ASSAY OF TROPONIN QUANT: CPT

## 2018-07-30 PROCEDURE — 80053 COMPREHEN METABOLIC PANEL: CPT | Performed by: EMERGENCY MEDICINE

## 2018-07-30 PROCEDURE — 93005 ELECTROCARDIOGRAM TRACING: CPT | Performed by: EMERGENCY MEDICINE

## 2018-07-30 PROCEDURE — 83690 ASSAY OF LIPASE: CPT | Performed by: EMERGENCY MEDICINE

## 2018-07-30 PROCEDURE — 81001 URINALYSIS AUTO W/SCOPE: CPT | Performed by: EMERGENCY MEDICINE

## 2018-07-30 PROCEDURE — 25010000002 FENTANYL CITRATE (PF) 100 MCG/2ML SOLUTION: Performed by: EMERGENCY MEDICINE

## 2018-07-30 PROCEDURE — 73590 X-RAY EXAM OF LOWER LEG: CPT

## 2018-07-30 PROCEDURE — 71045 X-RAY EXAM CHEST 1 VIEW: CPT

## 2018-07-30 RX ORDER — FENTANYL CITRATE 50 UG/ML
50 INJECTION, SOLUTION INTRAMUSCULAR; INTRAVENOUS ONCE
Status: COMPLETED | OUTPATIENT
Start: 2018-07-30 | End: 2018-07-30

## 2018-07-30 RX ORDER — SODIUM CHLORIDE 0.9 % (FLUSH) 0.9 %
10 SYRINGE (ML) INJECTION AS NEEDED
Status: DISCONTINUED | OUTPATIENT
Start: 2018-07-30 | End: 2018-08-04 | Stop reason: HOSPADM

## 2018-07-30 RX ADMIN — FENTANYL CITRATE 50 MCG: 50 INJECTION INTRAMUSCULAR; INTRAVENOUS at 23:08

## 2018-07-30 RX ADMIN — FENTANYL CITRATE 50 MCG: 50 INJECTION INTRAMUSCULAR; INTRAVENOUS at 22:23

## 2018-07-31 ENCOUNTER — APPOINTMENT (OUTPATIENT)
Dept: CARDIOLOGY | Facility: HOSPITAL | Age: 58
End: 2018-07-31

## 2018-07-31 PROBLEM — E66.01 MORBID OBESITY (HCC): Status: ACTIVE | Noted: 2018-07-31

## 2018-07-31 PROBLEM — I50.42 CHRONIC COMBINED SYSTOLIC AND DIASTOLIC CONGESTIVE HEART FAILURE (HCC): Status: ACTIVE | Noted: 2018-03-14

## 2018-07-31 PROBLEM — S82.852A CLOSED TRIMALLEOLAR FRACTURE OF LEFT ANKLE: Status: ACTIVE | Noted: 2018-07-31

## 2018-07-31 PROBLEM — I48.91 A-FIB (HCC): Status: ACTIVE | Noted: 2018-07-31

## 2018-07-31 PROBLEM — D72.829 LEUKOCYTOSIS: Status: ACTIVE | Noted: 2018-07-31

## 2018-07-31 PROBLEM — Z79.01 CHRONIC ANTICOAGULATION: Status: ACTIVE | Noted: 2018-07-31

## 2018-07-31 PROBLEM — D75.1 POLYCYTHEMIA: Status: ACTIVE | Noted: 2018-07-31

## 2018-07-31 LAB
ANION GAP SERPL CALCULATED.3IONS-SCNC: 2 MMOL/L (ref 3–11)
BH CV ECHO MEAS - AO ROOT AREA (BSA CORRECTED): 1.6
BH CV ECHO MEAS - AO ROOT AREA: 8.9 CM^2
BH CV ECHO MEAS - AO ROOT DIAM: 3.4 CM
BH CV ECHO MEAS - BSA(HAYCOCK): 2.3 M^2
BH CV ECHO MEAS - BSA: 2.1 M^2
BH CV ECHO MEAS - BZI_BMI: 44.8 KILOGRAMS/M^2
BH CV ECHO MEAS - BZI_METRIC_HEIGHT: 157.5 CM
BH CV ECHO MEAS - BZI_METRIC_WEIGHT: 111.1 KG
BH CV ECHO MEAS - CONTRAST EF (2CH): 56.8 ML/M^2
BH CV ECHO MEAS - CONTRAST EF 4CH: 70 ML/M^2
BH CV ECHO MEAS - EDV(CUBED): 139 ML
BH CV ECHO MEAS - EDV(MOD-SP2): 132 ML
BH CV ECHO MEAS - EDV(MOD-SP4): 110 ML
BH CV ECHO MEAS - EDV(TEICH): 128.3 ML
BH CV ECHO MEAS - EF(CUBED): 56.1 %
BH CV ECHO MEAS - EF(MOD-BP): 63 %
BH CV ECHO MEAS - EF(MOD-SP2): 56.8 %
BH CV ECHO MEAS - EF(MOD-SP4): 70 %
BH CV ECHO MEAS - EF(TEICH): 47.5 %
BH CV ECHO MEAS - ESV(CUBED): 61 ML
BH CV ECHO MEAS - ESV(MOD-SP2): 57 ML
BH CV ECHO MEAS - ESV(MOD-SP4): 33 ML
BH CV ECHO MEAS - ESV(TEICH): 67.4 ML
BH CV ECHO MEAS - FS: 24 %
BH CV ECHO MEAS - IVS/LVPW: 0.89
BH CV ECHO MEAS - IVSD: 1 CM
BH CV ECHO MEAS - LA DIMENSION: 4.5 CM
BH CV ECHO MEAS - LA/AO: 1.3
BH CV ECHO MEAS - LAT PEAK E' VEL: 8.3 CM/SEC
BH CV ECHO MEAS - LV DIASTOLIC VOL/BSA (35-75): 52.8 ML/M^2
BH CV ECHO MEAS - LV IVRT: 0.09 SEC
BH CV ECHO MEAS - LV MASS(C)D: 213.2 GRAMS
BH CV ECHO MEAS - LV MASS(C)DI: 102.3 GRAMS/M^2
BH CV ECHO MEAS - LV MAX PG: 4.3 MMHG
BH CV ECHO MEAS - LV MEAN PG: 1.9 MMHG
BH CV ECHO MEAS - LV SYSTOLIC VOL/BSA (12-30): 15.8 ML/M^2
BH CV ECHO MEAS - LV V1 MAX: 104.3 CM/SEC
BH CV ECHO MEAS - LV V1 MEAN: 60.5 CM/SEC
BH CV ECHO MEAS - LV V1 VTI: 21.3 CM
BH CV ECHO MEAS - LVIDD: 5.2 CM
BH CV ECHO MEAS - LVIDS: 3.9 CM
BH CV ECHO MEAS - LVLD AP2: 7.9 CM
BH CV ECHO MEAS - LVLD AP4: 7.5 CM
BH CV ECHO MEAS - LVLS AP2: 7 CM
BH CV ECHO MEAS - LVLS AP4: 6.2 CM
BH CV ECHO MEAS - LVOT AREA (M): 3.8 CM^2
BH CV ECHO MEAS - LVOT AREA: 3.6 CM^2
BH CV ECHO MEAS - LVOT DIAM: 2.1 CM
BH CV ECHO MEAS - LVPWD: 1.1 CM
BH CV ECHO MEAS - MED PEAK E' VEL: 7.02 CM/SEC
BH CV ECHO MEAS - MPA AREA: 11.9 CM^2
BH CV ECHO MEAS - MPA DIAM: 3.9 CM
BH CV ECHO MEAS - MV A MAX VEL: 145.4 CM/SEC
BH CV ECHO MEAS - MV DEC SLOPE: 558.7 CM/SEC^2
BH CV ECHO MEAS - MV DEC TIME: 0.29 SEC
BH CV ECHO MEAS - MV E MAX VEL: 118.7 CM/SEC
BH CV ECHO MEAS - MV E/A: 0.82
BH CV ECHO MEAS - MV MAX PG: 14 MMHG
BH CV ECHO MEAS - MV MEAN PG: 6 MMHG
BH CV ECHO MEAS - MV P1/2T MAX VEL: 139.8 CM/SEC
BH CV ECHO MEAS - MV P1/2T: 73.3 MSEC
BH CV ECHO MEAS - MV V2 MAX: 183.1 CM/SEC
BH CV ECHO MEAS - MV V2 MEAN: 115.4 CM/SEC
BH CV ECHO MEAS - MV V2 VTI: 38.9 CM
BH CV ECHO MEAS - MVA P1/2T LCG: 1.6 CM^2
BH CV ECHO MEAS - MVA(P1/2T): 3 CM^2
BH CV ECHO MEAS - MVA(VTI): 2 CM^2
BH CV ECHO MEAS - PA ACC SLOPE: 823.1 CM/SEC^2
BH CV ECHO MEAS - PA ACC TIME: 0.12 SEC
BH CV ECHO MEAS - PA PR(ACCEL): 25.1 MMHG
BH CV ECHO MEAS - RAP SYSTOLE: 3 MMHG
BH CV ECHO MEAS - RVSP: 48 MMHG
BH CV ECHO MEAS - SI(CUBED): 37.4 ML/M^2
BH CV ECHO MEAS - SI(LVOT): 36.6 ML/M^2
BH CV ECHO MEAS - SI(MOD-SP2): 36 ML/M^2
BH CV ECHO MEAS - SI(MOD-SP4): 37 ML/M^2
BH CV ECHO MEAS - SI(TEICH): 29.2 ML/M^2
BH CV ECHO MEAS - SV(CUBED): 77.9 ML
BH CV ECHO MEAS - SV(LVOT): 76.2 ML
BH CV ECHO MEAS - SV(MOD-SP2): 75 ML
BH CV ECHO MEAS - SV(MOD-SP4): 77 ML
BH CV ECHO MEAS - SV(TEICH): 60.9 ML
BH CV ECHO MEAS - TAPSE (>1.6): 2 CM2
BH CV ECHO MEAS - TR MAX V: 45 MMHG
BH CV ECHO MEAS - TR MAX VEL: 335 CM/SEC
BH CV ECHO MEASUREMENTS AVERAGE E/E' RATIO: 15.5
BH CV VAS BP LEFT ARM: NORMAL MMHG
BH CV XLRA - RV BASE: 4.9 CM
BH CV XLRA - RV LENGTH: 7.7 CM
BH CV XLRA - RV MID: 4.5 CM
BH CV XLRA - TDI S': 16 CM/SEC
BUN BLD-MCNC: 18 MG/DL (ref 9–23)
BUN/CREAT SERPL: 15 (ref 7–25)
CALCIUM SPEC-SCNC: 9.1 MG/DL (ref 8.7–10.4)
CHLORIDE SERPL-SCNC: 97 MMOL/L (ref 99–109)
CO2 SERPL-SCNC: 40 MMOL/L (ref 20–31)
CREAT BLD-MCNC: 1.2 MG/DL (ref 0.6–1.3)
DEPRECATED RDW RBC AUTO: 47.6 FL (ref 37–54)
ERYTHROCYTE [DISTWIDTH] IN BLOOD BY AUTOMATED COUNT: 13.5 % (ref 11.3–14.5)
GFR SERPL CREATININE-BSD FRML MDRD: 46 ML/MIN/1.73
GLUCOSE BLD-MCNC: 168 MG/DL (ref 70–100)
GLUCOSE BLDC GLUCOMTR-MCNC: 143 MG/DL (ref 70–130)
GLUCOSE BLDC GLUCOMTR-MCNC: 151 MG/DL (ref 70–130)
HCT VFR BLD AUTO: 45.7 % (ref 34.5–44)
HGB BLD-MCNC: 14.6 G/DL (ref 11.5–15.5)
LEFT ATRIUM VOLUME INDEX: 31.3 ML/M2
LV EF 2D ECHO EST: 60 %
MCH RBC QN AUTO: 31.1 PG (ref 27–31)
MCHC RBC AUTO-ENTMCNC: 31.9 G/DL (ref 32–36)
MCV RBC AUTO: 97.4 FL (ref 80–99)
PLATELET # BLD AUTO: 248 10*3/MM3 (ref 150–450)
PMV BLD AUTO: 11.2 FL (ref 6–12)
POTASSIUM BLD-SCNC: 4.6 MMOL/L (ref 3.5–5.5)
RBC # BLD AUTO: 4.69 10*6/MM3 (ref 3.89–5.14)
SODIUM BLD-SCNC: 139 MMOL/L (ref 132–146)
WBC NRBC COR # BLD: 16.17 10*3/MM3 (ref 3.5–10.8)

## 2018-07-31 PROCEDURE — 93306 TTE W/DOPPLER COMPLETE: CPT

## 2018-07-31 PROCEDURE — G0378 HOSPITAL OBSERVATION PER HR: HCPCS

## 2018-07-31 PROCEDURE — 25010000002 SULFUR HEXAFLUORIDE MICROSPH 60.7-25 MG RECONSTITUTED SUSPENSION: Performed by: FAMILY MEDICINE

## 2018-07-31 PROCEDURE — 63710000001 INSULIN LISPRO (HUMAN) PER 5 UNITS: Performed by: NURSE PRACTITIONER

## 2018-07-31 PROCEDURE — 82962 GLUCOSE BLOOD TEST: CPT

## 2018-07-31 PROCEDURE — 93306 TTE W/DOPPLER COMPLETE: CPT | Performed by: INTERNAL MEDICINE

## 2018-07-31 PROCEDURE — 99220 PR INITIAL OBSERVATION CARE/DAY 70 MINUTES: CPT | Performed by: INTERNAL MEDICINE

## 2018-07-31 PROCEDURE — 25010000002 HYDROMORPHONE PER 4 MG: Performed by: EMERGENCY MEDICINE

## 2018-07-31 PROCEDURE — 99222 1ST HOSP IP/OBS MODERATE 55: CPT | Performed by: INTERNAL MEDICINE

## 2018-07-31 PROCEDURE — 25010000002 ENOXAPARIN PER 10 MG: Performed by: FAMILY MEDICINE

## 2018-07-31 PROCEDURE — 25010000002 HYDROMORPHONE PER 4 MG: Performed by: FAMILY MEDICINE

## 2018-07-31 PROCEDURE — 80048 BASIC METABOLIC PNL TOTAL CA: CPT | Performed by: NURSE PRACTITIONER

## 2018-07-31 PROCEDURE — 85027 COMPLETE CBC AUTOMATED: CPT | Performed by: NURSE PRACTITIONER

## 2018-07-31 RX ORDER — HYDROMORPHONE HYDROCHLORIDE 1 MG/ML
0.5 INJECTION, SOLUTION INTRAMUSCULAR; INTRAVENOUS; SUBCUTANEOUS ONCE
Status: COMPLETED | OUTPATIENT
Start: 2018-07-31 | End: 2018-07-31

## 2018-07-31 RX ORDER — DEXTROSE MONOHYDRATE 25 G/50ML
25 INJECTION, SOLUTION INTRAVENOUS
Status: DISCONTINUED | OUTPATIENT
Start: 2018-07-31 | End: 2018-08-04 | Stop reason: HOSPADM

## 2018-07-31 RX ORDER — ALBUTEROL SULFATE 2.5 MG/3ML
2.5 SOLUTION RESPIRATORY (INHALATION) EVERY 6 HOURS PRN
Status: DISCONTINUED | OUTPATIENT
Start: 2018-07-31 | End: 2018-08-04 | Stop reason: HOSPADM

## 2018-07-31 RX ORDER — OXYCODONE HYDROCHLORIDE AND ACETAMINOPHEN 5; 325 MG/1; MG/1
1 TABLET ORAL EVERY 6 HOURS PRN
Status: DISCONTINUED | OUTPATIENT
Start: 2018-07-31 | End: 2018-08-04 | Stop reason: HOSPADM

## 2018-07-31 RX ORDER — NICOTINE 21 MG/24HR
1 PATCH, TRANSDERMAL 24 HOURS TRANSDERMAL
Status: DISCONTINUED | OUTPATIENT
Start: 2018-07-31 | End: 2018-08-04 | Stop reason: HOSPADM

## 2018-07-31 RX ORDER — CITALOPRAM 20 MG/1
20 TABLET ORAL DAILY
Status: DISCONTINUED | OUTPATIENT
Start: 2018-07-31 | End: 2018-08-02 | Stop reason: SDUPTHER

## 2018-07-31 RX ORDER — SODIUM CHLORIDE 0.9 % (FLUSH) 0.9 %
1-10 SYRINGE (ML) INJECTION AS NEEDED
Status: DISCONTINUED | OUTPATIENT
Start: 2018-07-31 | End: 2018-08-04 | Stop reason: HOSPADM

## 2018-07-31 RX ORDER — HYDROXYZINE HYDROCHLORIDE 25 MG/1
50 TABLET, FILM COATED ORAL EVERY MORNING
Status: DISCONTINUED | OUTPATIENT
Start: 2018-07-31 | End: 2018-08-04 | Stop reason: HOSPADM

## 2018-07-31 RX ORDER — FENTANYL CITRATE 50 UG/ML
50 INJECTION, SOLUTION INTRAMUSCULAR; INTRAVENOUS
Status: DISCONTINUED | OUTPATIENT
Start: 2018-07-31 | End: 2018-08-04 | Stop reason: HOSPADM

## 2018-07-31 RX ORDER — PANTOPRAZOLE SODIUM 40 MG/1
40 TABLET, DELAYED RELEASE ORAL EVERY MORNING
Status: DISCONTINUED | OUTPATIENT
Start: 2018-07-31 | End: 2018-08-04 | Stop reason: HOSPADM

## 2018-07-31 RX ORDER — POTASSIUM CHLORIDE 750 MG/1
20 CAPSULE, EXTENDED RELEASE ORAL DAILY
Status: DISCONTINUED | OUTPATIENT
Start: 2018-07-31 | End: 2018-08-04 | Stop reason: HOSPADM

## 2018-07-31 RX ORDER — TORSEMIDE 20 MG/1
20 TABLET ORAL EVERY 12 HOURS
Status: DISCONTINUED | OUTPATIENT
Start: 2018-07-31 | End: 2018-08-04 | Stop reason: HOSPADM

## 2018-07-31 RX ORDER — CETIRIZINE HYDROCHLORIDE 10 MG/1
10 TABLET ORAL DAILY
Status: DISCONTINUED | OUTPATIENT
Start: 2018-07-31 | End: 2018-08-04 | Stop reason: HOSPADM

## 2018-07-31 RX ORDER — ONDANSETRON 4 MG/1
4 TABLET, FILM COATED ORAL EVERY 6 HOURS PRN
Status: DISCONTINUED | OUTPATIENT
Start: 2018-07-31 | End: 2018-08-04 | Stop reason: HOSPADM

## 2018-07-31 RX ORDER — ACETAMINOPHEN 325 MG/1
650 TABLET ORAL EVERY 4 HOURS PRN
Status: DISCONTINUED | OUTPATIENT
Start: 2018-07-31 | End: 2018-08-04 | Stop reason: HOSPADM

## 2018-07-31 RX ORDER — GABAPENTIN 100 MG/1
100 CAPSULE ORAL DAILY
Status: DISCONTINUED | OUTPATIENT
Start: 2018-07-31 | End: 2018-08-04 | Stop reason: HOSPADM

## 2018-07-31 RX ORDER — HYDROMORPHONE HYDROCHLORIDE 1 MG/ML
0.5 INJECTION, SOLUTION INTRAMUSCULAR; INTRAVENOUS; SUBCUTANEOUS
Status: DISCONTINUED | OUTPATIENT
Start: 2018-07-31 | End: 2018-08-04 | Stop reason: HOSPADM

## 2018-07-31 RX ORDER — CITALOPRAM 40 MG/1
40 TABLET ORAL DAILY
COMMUNITY

## 2018-07-31 RX ORDER — ASPIRIN 81 MG/1
81 TABLET ORAL DAILY
Status: DISCONTINUED | OUTPATIENT
Start: 2018-07-31 | End: 2018-08-04 | Stop reason: HOSPADM

## 2018-07-31 RX ORDER — GABAPENTIN 300 MG/1
300 CAPSULE ORAL NIGHTLY
Status: DISCONTINUED | OUTPATIENT
Start: 2018-07-31 | End: 2018-08-04 | Stop reason: HOSPADM

## 2018-07-31 RX ORDER — BUDESONIDE AND FORMOTEROL FUMARATE DIHYDRATE 160; 4.5 UG/1; UG/1
2 AEROSOL RESPIRATORY (INHALATION)
Status: DISCONTINUED | OUTPATIENT
Start: 2018-07-31 | End: 2018-08-04 | Stop reason: HOSPADM

## 2018-07-31 RX ORDER — DOFETILIDE 0.25 MG/1
250 CAPSULE ORAL EVERY 12 HOURS SCHEDULED
Status: DISCONTINUED | OUTPATIENT
Start: 2018-07-31 | End: 2018-08-04 | Stop reason: HOSPADM

## 2018-07-31 RX ORDER — ONDANSETRON 2 MG/ML
4 INJECTION INTRAMUSCULAR; INTRAVENOUS EVERY 6 HOURS PRN
Status: DISCONTINUED | OUTPATIENT
Start: 2018-07-31 | End: 2018-08-04 | Stop reason: HOSPADM

## 2018-07-31 RX ORDER — NICOTINE POLACRILEX 4 MG
15 LOZENGE BUCCAL
Status: DISCONTINUED | OUTPATIENT
Start: 2018-07-31 | End: 2018-08-04 | Stop reason: HOSPADM

## 2018-07-31 RX ADMIN — METOPROLOL TARTRATE 12.5 MG: 25 TABLET, FILM COATED ORAL at 21:52

## 2018-07-31 RX ADMIN — GABAPENTIN 100 MG: 100 CAPSULE ORAL at 09:33

## 2018-07-31 RX ADMIN — PANTOPRAZOLE SODIUM 40 MG: 40 TABLET, DELAYED RELEASE ORAL at 05:20

## 2018-07-31 RX ADMIN — HYDROMORPHONE HYDROCHLORIDE 0.5 MG: 1 INJECTION, SOLUTION INTRAMUSCULAR; INTRAVENOUS; SUBCUTANEOUS at 01:18

## 2018-07-31 RX ADMIN — OXYCODONE HYDROCHLORIDE AND ACETAMINOPHEN 1 TABLET: 5; 325 TABLET ORAL at 17:28

## 2018-07-31 RX ADMIN — DOFETILIDE 250 MCG: 0.25 CAPSULE ORAL at 09:32

## 2018-07-31 RX ADMIN — DOFETILIDE 250 MCG: 0.25 CAPSULE ORAL at 23:01

## 2018-07-31 RX ADMIN — TORSEMIDE 20 MG: 20 TABLET ORAL at 09:32

## 2018-07-31 RX ADMIN — ENOXAPARIN SODIUM 110 MG: 120 INJECTION SUBCUTANEOUS at 12:14

## 2018-07-31 RX ADMIN — HYDROMORPHONE HYDROCHLORIDE 0.5 MG: 1 INJECTION, SOLUTION INTRAMUSCULAR; INTRAVENOUS; SUBCUTANEOUS at 22:09

## 2018-07-31 RX ADMIN — OXYCODONE HYDROCHLORIDE AND ACETAMINOPHEN 1 TABLET: 5; 325 TABLET ORAL at 11:28

## 2018-07-31 RX ADMIN — CETIRIZINE HYDROCHLORIDE 10 MG: 10 TABLET, FILM COATED ORAL at 12:14

## 2018-07-31 RX ADMIN — POTASSIUM CHLORIDE 20 MEQ: 750 CAPSULE, EXTENDED RELEASE ORAL at 12:14

## 2018-07-31 RX ADMIN — GABAPENTIN 300 MG: 300 CAPSULE ORAL at 21:52

## 2018-07-31 RX ADMIN — TORSEMIDE 20 MG: 20 TABLET ORAL at 21:52

## 2018-07-31 RX ADMIN — INSULIN LISPRO 2 UNITS: 100 INJECTION, SOLUTION INTRAVENOUS; SUBCUTANEOUS at 09:38

## 2018-07-31 RX ADMIN — METOPROLOL TARTRATE 12.5 MG: 25 TABLET, FILM COATED ORAL at 09:33

## 2018-07-31 RX ADMIN — GABAPENTIN 300 MG: 300 CAPSULE ORAL at 02:17

## 2018-07-31 RX ADMIN — CITALOPRAM HYDROBROMIDE 20 MG: 20 TABLET ORAL at 09:33

## 2018-07-31 RX ADMIN — HYDROXYZINE HYDROCHLORIDE 50 MG: 25 TABLET, FILM COATED ORAL at 09:33

## 2018-07-31 RX ADMIN — ASPIRIN 81 MG: 81 TABLET, COATED ORAL at 12:14

## 2018-07-31 RX ADMIN — SULFUR HEXAFLUORIDE 2 ML: KIT at 17:20

## 2018-07-31 RX ADMIN — OXYCODONE HYDROCHLORIDE AND ACETAMINOPHEN 1 TABLET: 5; 325 TABLET ORAL at 05:21

## 2018-07-31 RX ADMIN — APIXABAN 5 MG: 5 TABLET, FILM COATED ORAL at 02:17

## 2018-07-31 RX ADMIN — OXYCODONE HYDROCHLORIDE AND ACETAMINOPHEN 1 TABLET: 5; 325 TABLET ORAL at 21:52

## 2018-07-31 RX ADMIN — ENOXAPARIN SODIUM 110 MG: 120 INJECTION SUBCUTANEOUS at 23:02

## 2018-08-01 LAB
GLUCOSE BLDC GLUCOMTR-MCNC: 145 MG/DL (ref 70–130)
GLUCOSE BLDC GLUCOMTR-MCNC: 145 MG/DL (ref 70–130)
GLUCOSE BLDC GLUCOMTR-MCNC: 149 MG/DL (ref 70–130)
GLUCOSE BLDC GLUCOMTR-MCNC: 150 MG/DL (ref 70–130)
GLUCOSE BLDC GLUCOMTR-MCNC: 174 MG/DL (ref 70–130)
GLUCOSE BLDC GLUCOMTR-MCNC: 176 MG/DL (ref 70–130)

## 2018-08-01 PROCEDURE — 25010000002 HYDROMORPHONE PER 4 MG: Performed by: FAMILY MEDICINE

## 2018-08-01 PROCEDURE — 82962 GLUCOSE BLOOD TEST: CPT

## 2018-08-01 PROCEDURE — G0378 HOSPITAL OBSERVATION PER HR: HCPCS

## 2018-08-01 PROCEDURE — 25010000002 ENOXAPARIN PER 10 MG: Performed by: FAMILY MEDICINE

## 2018-08-01 PROCEDURE — 94799 UNLISTED PULMONARY SVC/PX: CPT

## 2018-08-01 PROCEDURE — 99226 PR SBSQ OBSERVATION CARE/DAY 35 MINUTES: CPT | Performed by: INTERNAL MEDICINE

## 2018-08-01 RX ADMIN — ENOXAPARIN SODIUM 110 MG: 120 INJECTION SUBCUTANEOUS at 12:09

## 2018-08-01 RX ADMIN — DOFETILIDE 250 MCG: 0.25 CAPSULE ORAL at 20:24

## 2018-08-01 RX ADMIN — OXYCODONE HYDROCHLORIDE AND ACETAMINOPHEN 1 TABLET: 5; 325 TABLET ORAL at 05:03

## 2018-08-01 RX ADMIN — PANTOPRAZOLE SODIUM 40 MG: 40 TABLET, DELAYED RELEASE ORAL at 05:03

## 2018-08-01 RX ADMIN — GABAPENTIN 300 MG: 300 CAPSULE ORAL at 20:24

## 2018-08-01 RX ADMIN — CETIRIZINE HYDROCHLORIDE 10 MG: 10 TABLET, FILM COATED ORAL at 08:49

## 2018-08-01 RX ADMIN — HYDROMORPHONE HYDROCHLORIDE 0.5 MG: 1 INJECTION, SOLUTION INTRAMUSCULAR; INTRAVENOUS; SUBCUTANEOUS at 16:20

## 2018-08-01 RX ADMIN — OXYCODONE HYDROCHLORIDE AND ACETAMINOPHEN 1 TABLET: 5; 325 TABLET ORAL at 20:24

## 2018-08-01 RX ADMIN — METOPROLOL TARTRATE 12.5 MG: 25 TABLET, FILM COATED ORAL at 20:24

## 2018-08-01 RX ADMIN — TORSEMIDE 20 MG: 20 TABLET ORAL at 08:49

## 2018-08-01 RX ADMIN — HYDROXYZINE HYDROCHLORIDE 50 MG: 25 TABLET, FILM COATED ORAL at 08:49

## 2018-08-01 RX ADMIN — POTASSIUM CHLORIDE 20 MEQ: 750 CAPSULE, EXTENDED RELEASE ORAL at 08:49

## 2018-08-01 RX ADMIN — HYDROMORPHONE HYDROCHLORIDE 0.5 MG: 1 INJECTION, SOLUTION INTRAMUSCULAR; INTRAVENOUS; SUBCUTANEOUS at 05:49

## 2018-08-01 RX ADMIN — CITALOPRAM HYDROBROMIDE 20 MG: 20 TABLET ORAL at 08:49

## 2018-08-01 RX ADMIN — INSULIN LISPRO 2 UNITS: 100 INJECTION, SOLUTION INTRAVENOUS; SUBCUTANEOUS at 12:09

## 2018-08-01 RX ADMIN — DOFETILIDE 250 MCG: 0.25 CAPSULE ORAL at 08:49

## 2018-08-01 RX ADMIN — ASPIRIN 81 MG: 81 TABLET, COATED ORAL at 08:49

## 2018-08-01 RX ADMIN — TORSEMIDE 20 MG: 20 TABLET ORAL at 20:24

## 2018-08-01 RX ADMIN — GABAPENTIN 100 MG: 100 CAPSULE ORAL at 08:49

## 2018-08-01 RX ADMIN — HYDROMORPHONE HYDROCHLORIDE 0.5 MG: 1 INJECTION, SOLUTION INTRAMUSCULAR; INTRAVENOUS; SUBCUTANEOUS at 20:24

## 2018-08-01 RX ADMIN — OXYCODONE HYDROCHLORIDE AND ACETAMINOPHEN 1 TABLET: 5; 325 TABLET ORAL at 12:15

## 2018-08-01 RX ADMIN — INSULIN LISPRO 2 UNITS: 100 INJECTION, SOLUTION INTRAVENOUS; SUBCUTANEOUS at 08:50

## 2018-08-01 NOTE — PROGRESS NOTES
[facility] Medicine Services  PROGRESS NOTE    Patient Name: Maria T Garcia  : 1960  MRN: [id number]    Date of Admission: 2018  Length of Stay: 0  Primary Care Physician: Jessica Portillo    Subjective   Subjective     CC:  Ankle fracture    HPI:  Pt sleeping this am, but wakes briefly. Denies any complaints currently, states that her pain is well controlled.     Review of Systems  Gen- No fevers, chills  CV- No chest pain, palpitations  Resp- No cough, dyspnea  GI- No N/V/D, abd pain    Otherwise ROS is negative except as mentioned in the HPI.    Objective   Objective     Vital Signs:   Temp:  [96.4 °F (35.8 °C)-99.5 °F (37.5 °C)] 96.4 °F (35.8 °C)  Heart Rate:  [] 84  Resp:  [16-24] 24  BP: ()/(48-79) 115/70        Physical Exam:  Constitutional: No acute distress  HENT: NCAT, mucous membranes moist  Respiratory: Clear to auscultation bilaterally, respiratory effort normal   Cardiovascular: RRR, no murmurs, rubs, or gallops, palpable pedal pulses bilaterally  Gastrointestinal: Positive bowel sounds, soft, nontender, nondistended  Musculoskeletal: No bilateral ankle edema, left ankle in brace   Psychiatric: Appropriate affect, cooperative  Neurologic: Oriented x 3, strength symmetric in all extremities, Cranial Nerves grossly intact to confrontation, speech clear  Skin: No rashes  Results Reviewed:  I have personally reviewed current lab, radiology, and data and agree.      Results from last 7 days  Lab Units 18  0812 18   WBC 10*3/mm3 16.17* 18.13*   HEMOGLOBIN g/dL 14.6 15.7*   HEMATOCRIT % 45.7* 48.4*   PLATELETS 10*3/mm3 248 256       Results from last 7 days  Lab Units 18  0812 18   SODIUM mmol/L 139 138   POTASSIUM mmol/L 4.6 3.8   CHLORIDE mmol/L 97* 97*   CO2 mmol/L 40.0* 35.0*   BUN mg/dL 18 18   CREATININE mg/dL 1.20 1.17   GLUCOSE mg/dL 168* 157*   CALCIUM mg/dL 9.1 9.5   ALT (SGPT) U/L  --  21   AST (SGOT) U/L  --   20     Estimated Creatinine Clearance: 60.8 mL/min (by C-G formula based on SCr of 1.2 mg/dL).  BNP   Date Value Ref Range Status   07/30/2018 18.0 0.0 - 100.0 pg/mL Final     Comment:     Results may be falsely decreased if patient taking Biotin.       Microbiology Results Abnormal     None          Imaging Results (last 24 hours)     ** No results found for the last 24 hours. **        Results for orders placed during the hospital encounter of 07/30/18   Adult Transthoracic Echo Complete W/ Cont if Necessary Per Protocol    Narrative · Left ventricular systolic function is normal. Estimated EF = 60%.  · Left ventricular wall thickness is consistent with borderline concentric   hypertrophy.  · The left ventricular cavity is borderline dilated.  · Left ventricular diastolic dysfunction (grade I a) consistent with   impaired relaxation.  · Right ventricular cavity is moderately dilated.  · Systolic and diastolic flattening of the interventricular septum   consistent with right ventricle pressure and volume overload.  · Right atrial cavity size is mildly dilated.  · Mild mitral valve stenosis is present  · Mild mitral valve regurgitation is present  · Estimated right ventricular systolic pressure from tricuspid   regurgitation is moderately elevated (45-55 mmHg).          I have reviewed the medications.    Assessment/Plan   Assessment / Plan     Hospital Problem List     COPD (chronic obstructive pulmonary disease) (CMS/[facility])    Diabetes mellitus (CMS/[facility])    Tobacco abuse    Chronic combined systolic and diastolic congestive heart failure (CMS/HCC)    Closed displaced trimalleolar fracture of left ankle    Leukocytosis    Polycythemia    A-fib (CMS/HCC)    Chronic anticoagulation    Morbid obesity (CMS/HCC)             Brief Hospital Course to date:  Maria T Garcia is a 58 y.o. female with PMH of Afib on Eliquis, DM, COPD, tobacco abuse, CHF presents with left trimalleolar ankle fracture.       Plan:  --pt and  daughter deferred surgical intervention for time being.  Dr. Valle is following.  --SSI  --nicotine patch while inpatient  --continue rate control, full dose LMWH for now until definitive plan re: surgery. If no intervention, will resume eliquis      DVT Prophylaxis:  anticoagulated    CODE STATUS:   Code Status and Medical Interventions:   Ordered at: 07/31/18 0145     Level Of Support Discussed With:    Patient     Code Status:    CPR     Medical Interventions (Level of Support Prior to Arrest):    Full       Disposition: I expect the patient to be discharged TBD      Electronically signed by Yuliya Payne MD, 08/01/18, 2:31 PM.

## 2018-08-01 NOTE — PROGRESS NOTES
Continued Stay Note  [facility]     Patient Name: Maria T Garcia  MRN: [id number]  Today's Date: 8/1/2018    Admit Date: 7/30/2018          Discharge Plan     Row Name 08/01/18 1137       Plan    Plan TBD    Patient/Family in Agreement with Plan yes    Plan Comments CM asked to see patient to assess DME and other needs for possible dc. Patient may benefit from a therapy eval to help determine her gait stability, balance, etc, and what equipment and post dc therapy is recommended. CM will then make any necessary referrals and arrangements. MD to advise. CM following.               Discharge Codes    No documentation.       Expected Discharge Date and Time     Expected Discharge Date Expected Discharge Time    Aug 3, 2018             Katie Kearns RN

## 2018-08-01 NOTE — PROGRESS NOTES
Ortho    Patient seen sleeping comfortably in bed. NAD.  Discussed plan for non-op treatment with daughter at length.

## 2018-08-01 NOTE — PLAN OF CARE
Problem: Patient Care Overview  Goal: Plan of Care Review  Outcome: Ongoing (interventions implemented as appropriate)   08/01/18 9921   Plan of Care Review   Progress no change   OTHER   Outcome Summary Low BP this shift, sinus arrhythmia noted, remains on 4L O2 per NC. Has rested most of shift, prn pain meds given for left ankle pain. Plan for cast to be placed on LLE by Dr. Valle tomorrow per pt decision. They will take her to the OR to sedate r/t anxiety. Pt to be NPO after midnight to prep.    Coping/Psychosocial   Plan of Care Reviewed With patient

## 2018-08-02 ENCOUNTER — APPOINTMENT (OUTPATIENT)
Dept: GENERAL RADIOLOGY | Facility: HOSPITAL | Age: 58
End: 2018-08-02

## 2018-08-02 ENCOUNTER — ANESTHESIA (OUTPATIENT)
Dept: PERIOP | Facility: HOSPITAL | Age: 58
End: 2018-08-02

## 2018-08-02 ENCOUNTER — ANESTHESIA EVENT (OUTPATIENT)
Dept: PERIOP | Facility: HOSPITAL | Age: 58
End: 2018-08-02

## 2018-08-02 LAB
ANION GAP SERPL CALCULATED.3IONS-SCNC: 7 MMOL/L (ref 3–11)
BUN BLD-MCNC: 16 MG/DL (ref 9–23)
BUN/CREAT SERPL: 15.4 (ref 7–25)
CALCIUM SPEC-SCNC: 8.5 MG/DL (ref 8.7–10.4)
CHLORIDE SERPL-SCNC: 94 MMOL/L (ref 99–109)
CO2 SERPL-SCNC: 38 MMOL/L (ref 20–31)
CREAT BLD-MCNC: 1.04 MG/DL (ref 0.6–1.3)
DEPRECATED RDW RBC AUTO: 47.8 FL (ref 37–54)
ERYTHROCYTE [DISTWIDTH] IN BLOOD BY AUTOMATED COUNT: 13.4 % (ref 11.3–14.5)
GFR SERPL CREATININE-BSD FRML MDRD: 55 ML/MIN/1.73
GLUCOSE BLD-MCNC: 99 MG/DL (ref 70–100)
GLUCOSE BLDC GLUCOMTR-MCNC: 107 MG/DL (ref 70–130)
GLUCOSE BLDC GLUCOMTR-MCNC: 124 MG/DL (ref 70–130)
GLUCOSE BLDC GLUCOMTR-MCNC: 126 MG/DL (ref 70–130)
GLUCOSE BLDC GLUCOMTR-MCNC: 89 MG/DL (ref 70–130)
HCT VFR BLD AUTO: 43.6 % (ref 34.5–44)
HGB BLD-MCNC: 13.6 G/DL (ref 11.5–15.5)
MCH RBC QN AUTO: 30.6 PG (ref 27–31)
MCHC RBC AUTO-ENTMCNC: 31.2 G/DL (ref 32–36)
MCV RBC AUTO: 98.2 FL (ref 80–99)
PLATELET # BLD AUTO: 232 10*3/MM3 (ref 150–450)
PMV BLD AUTO: 11.4 FL (ref 6–12)
POTASSIUM BLD-SCNC: 3.5 MMOL/L (ref 3.5–5.5)
RBC # BLD AUTO: 4.44 10*6/MM3 (ref 3.89–5.14)
SODIUM BLD-SCNC: 139 MMOL/L (ref 132–146)
WBC NRBC COR # BLD: 11.96 10*3/MM3 (ref 3.5–10.8)

## 2018-08-02 PROCEDURE — 76000 FLUOROSCOPY <1 HR PHYS/QHP: CPT

## 2018-08-02 PROCEDURE — 0QSHXZZ REPOSITION LEFT TIBIA, EXTERNAL APPROACH: ICD-10-PCS | Performed by: ORTHOPAEDIC SURGERY

## 2018-08-02 PROCEDURE — 94799 UNLISTED PULMONARY SVC/PX: CPT

## 2018-08-02 PROCEDURE — 25010000002 HYDROMORPHONE PER 4 MG: Performed by: FAMILY MEDICINE

## 2018-08-02 PROCEDURE — 80048 BASIC METABOLIC PNL TOTAL CA: CPT | Performed by: INTERNAL MEDICINE

## 2018-08-02 PROCEDURE — 99233 SBSQ HOSP IP/OBS HIGH 50: CPT | Performed by: INTERNAL MEDICINE

## 2018-08-02 PROCEDURE — 25010000002 PROPOFOL 10 MG/ML EMULSION: Performed by: NURSE ANESTHETIST, CERTIFIED REGISTERED

## 2018-08-02 PROCEDURE — 85027 COMPLETE CBC AUTOMATED: CPT | Performed by: INTERNAL MEDICINE

## 2018-08-02 PROCEDURE — 25010000002 HYDROMORPHONE PER 4 MG: Performed by: NURSE ANESTHETIST, CERTIFIED REGISTERED

## 2018-08-02 PROCEDURE — 82962 GLUCOSE BLOOD TEST: CPT

## 2018-08-02 PROCEDURE — 0QSKXZZ REPOSITION LEFT FIBULA, EXTERNAL APPROACH: ICD-10-PCS | Performed by: ORTHOPAEDIC SURGERY

## 2018-08-02 PROCEDURE — 25010000002 ENOXAPARIN PER 10 MG: Performed by: FAMILY MEDICINE

## 2018-08-02 RX ORDER — HYDROMORPHONE HYDROCHLORIDE 1 MG/ML
0.5 INJECTION, SOLUTION INTRAMUSCULAR; INTRAVENOUS; SUBCUTANEOUS
Status: DISCONTINUED | OUTPATIENT
Start: 2018-08-02 | End: 2018-08-02 | Stop reason: HOSPADM

## 2018-08-02 RX ORDER — FAMOTIDINE 20 MG/1
20 TABLET, FILM COATED ORAL ONCE
Status: COMPLETED | OUTPATIENT
Start: 2018-08-02 | End: 2018-08-02

## 2018-08-02 RX ORDER — CITALOPRAM 20 MG/1
40 TABLET ORAL DAILY
Status: DISCONTINUED | OUTPATIENT
Start: 2018-08-02 | End: 2018-08-02

## 2018-08-02 RX ORDER — SODIUM CHLORIDE, SODIUM LACTATE, POTASSIUM CHLORIDE, CALCIUM CHLORIDE 600; 310; 30; 20 MG/100ML; MG/100ML; MG/100ML; MG/100ML
9 INJECTION, SOLUTION INTRAVENOUS CONTINUOUS
Status: DISCONTINUED | OUTPATIENT
Start: 2018-08-02 | End: 2018-08-04 | Stop reason: HOSPADM

## 2018-08-02 RX ORDER — CITALOPRAM 20 MG/1
40 TABLET ORAL DAILY
Status: DISCONTINUED | OUTPATIENT
Start: 2018-08-03 | End: 2018-08-04 | Stop reason: HOSPADM

## 2018-08-02 RX ORDER — PROPOFOL 10 MG/ML
VIAL (ML) INTRAVENOUS AS NEEDED
Status: DISCONTINUED | OUTPATIENT
Start: 2018-08-02 | End: 2018-08-02 | Stop reason: SURG

## 2018-08-02 RX ORDER — CITALOPRAM 20 MG/1
20 TABLET ORAL ONCE
Status: COMPLETED | OUTPATIENT
Start: 2018-08-02 | End: 2018-08-02

## 2018-08-02 RX ORDER — FENTANYL CITRATE 50 UG/ML
50 INJECTION, SOLUTION INTRAMUSCULAR; INTRAVENOUS
Status: DISCONTINUED | OUTPATIENT
Start: 2018-08-02 | End: 2018-08-02 | Stop reason: HOSPADM

## 2018-08-02 RX ADMIN — FAMOTIDINE 20 MG: 20 TABLET ORAL at 10:43

## 2018-08-02 RX ADMIN — GABAPENTIN 300 MG: 300 CAPSULE ORAL at 20:48

## 2018-08-02 RX ADMIN — SODIUM CHLORIDE, POTASSIUM CHLORIDE, SODIUM LACTATE AND CALCIUM CHLORIDE 9 ML/HR: 600; 310; 30; 20 INJECTION, SOLUTION INTRAVENOUS at 10:42

## 2018-08-02 RX ADMIN — CITALOPRAM HYDROBROMIDE 20 MG: 20 TABLET ORAL at 18:07

## 2018-08-02 RX ADMIN — TORSEMIDE 20 MG: 20 TABLET ORAL at 20:48

## 2018-08-02 RX ADMIN — GABAPENTIN 100 MG: 100 CAPSULE ORAL at 16:02

## 2018-08-02 RX ADMIN — HYDROMORPHONE HYDROCHLORIDE 0.5 MG: 1 INJECTION, SOLUTION INTRAMUSCULAR; INTRAVENOUS; SUBCUTANEOUS at 13:18

## 2018-08-02 RX ADMIN — PROPOFOL 100 MG: 10 INJECTION, EMULSION INTRAVENOUS at 12:47

## 2018-08-02 RX ADMIN — POTASSIUM CHLORIDE 20 MEQ: 750 CAPSULE, EXTENDED RELEASE ORAL at 15:19

## 2018-08-02 RX ADMIN — OXYCODONE HYDROCHLORIDE AND ACETAMINOPHEN 1 TABLET: 5; 325 TABLET ORAL at 20:48

## 2018-08-02 RX ADMIN — METOPROLOL TARTRATE 12.5 MG: 25 TABLET, FILM COATED ORAL at 07:38

## 2018-08-02 RX ADMIN — HYDROMORPHONE HYDROCHLORIDE 0.5 MG: 1 INJECTION, SOLUTION INTRAMUSCULAR; INTRAVENOUS; SUBCUTANEOUS at 05:48

## 2018-08-02 RX ADMIN — ASPIRIN 81 MG: 81 TABLET, COATED ORAL at 15:18

## 2018-08-02 RX ADMIN — OXYCODONE HYDROCHLORIDE AND ACETAMINOPHEN 1 TABLET: 5; 325 TABLET ORAL at 02:18

## 2018-08-02 RX ADMIN — CITALOPRAM HYDROBROMIDE 20 MG: 20 TABLET ORAL at 15:22

## 2018-08-02 RX ADMIN — HYDROXYZINE HYDROCHLORIDE 50 MG: 25 TABLET, FILM COATED ORAL at 15:18

## 2018-08-02 RX ADMIN — CETIRIZINE HYDROCHLORIDE 10 MG: 10 TABLET, FILM COATED ORAL at 15:22

## 2018-08-02 RX ADMIN — OXYCODONE HYDROCHLORIDE AND ACETAMINOPHEN 1 TABLET: 5; 325 TABLET ORAL at 14:39

## 2018-08-02 RX ADMIN — PROPOFOL 100 MG: 10 INJECTION, EMULSION INTRAVENOUS at 12:50

## 2018-08-02 RX ADMIN — HYDROMORPHONE HYDROCHLORIDE 0.5 MG: 1 INJECTION, SOLUTION INTRAMUSCULAR; INTRAVENOUS; SUBCUTANEOUS at 02:18

## 2018-08-02 RX ADMIN — HYDROMORPHONE HYDROCHLORIDE 0.5 MG: 1 INJECTION, SOLUTION INTRAMUSCULAR; INTRAVENOUS; SUBCUTANEOUS at 13:30

## 2018-08-02 RX ADMIN — DOFETILIDE 250 MCG: 0.25 CAPSULE ORAL at 20:49

## 2018-08-02 RX ADMIN — ENOXAPARIN SODIUM 110 MG: 120 INJECTION SUBCUTANEOUS at 15:21

## 2018-08-02 NOTE — BRIEF OP NOTE
CLOSED REDUCTION METATARSAL FRACTURE  Progress Note    Maria T Garcia  7/30/2018 - 8/2/2018    Pre-op Diagnosis:   Left ankle trimalleolar fracture       Post-Op Diagnosis Codes:   Same    Procedure/CPT® Codes:      Procedure(s):  CLOSED REDUCTION TRIMALLEOLAR ANKLE FRACTURE    Surgeon(s):  Waqar Valle MD    Anesthesia: General    Staff:   Circulator: Chante Ozuna RN  Scrub Person: Froylan Coleman    Estimated Blood Loss: none    Urine Voided: 350 mL    Specimens:                None      Drains:      Findings:  see operative note    Complications: None noted      Waqar Valle MD     Date: 8/2/2018  Time: 12:58 PM

## 2018-08-02 NOTE — PLAN OF CARE
Problem: Patient Care Overview  Goal: Plan of Care Review  Outcome: Ongoing (interventions implemented as appropriate)   08/02/18 1903   OTHER   Outcome Summary Pt has had minimal complaints of pain prior and after surgery. Percocet given once Vitals were within normal range. BP below 110 BP meds held once pt arrived back to the unit. Dressing on left lower extremity intact.      Coping/Psychosocial   Plan of Care Reviewed With patient       Problem: Fall Risk (Adult)  Goal: Identify Related Risk Factors and Signs and Symptoms  Outcome: Ongoing (interventions implemented as appropriate)   08/02/18 1903   Fall Risk (Adult)   Related Risk Factors (Fall Risk) fatigue/slow reaction;gait/mobility problems;history of falls     Goal: Absence of Fall  Outcome: Ongoing (interventions implemented as appropriate)   08/02/18 1903   Fall Risk (Adult)   Absence of Fall making progress toward outcome       Problem: Skin Injury Risk (Adult)  Goal: Identify Related Risk Factors and Signs and Symptoms  Outcome: Ongoing (interventions implemented as appropriate)   08/02/18 1903   Skin Injury Risk (Adult)   Related Risk Factors (Skin Injury Risk) body weight extremes;mobility impaired     Goal: Skin Health and Integrity  Outcome: Ongoing (interventions implemented as appropriate)   08/02/18 1903   Skin Injury Risk (Adult)   Skin Health and Integrity making progress toward outcome       Problem: Pain, Acute (Adult)  Goal: Identify Related Risk Factors and Signs and Symptoms  Outcome: Ongoing (interventions implemented as appropriate)   08/02/18 1903   Pain, Acute (Adult)   Related Risk Factors (Acute Pain) surgery   Signs and Symptoms (Acute Pain) BADLs/IADLs reluctance/inability to perform;fatigue/weakness     Goal: Acceptable Pain Control/Comfort Level  Outcome: Ongoing (interventions implemented as appropriate)   08/02/18 1903   Pain, Acute (Adult)   Acceptable Pain Control/Comfort Level making progress toward outcome

## 2018-08-02 NOTE — PLAN OF CARE
Problem: Patient Care Overview  Goal: Plan of Care Review  Outcome: Ongoing (interventions implemented as appropriate)   08/02/18 8685   Plan of Care Review   Progress improving   OTHER   Outcome Summary patient is NPO anticipating surgery today    Coping/Psychosocial   Plan of Care Reviewed With patient

## 2018-08-02 NOTE — ANESTHESIA POSTPROCEDURE EVALUATION
Patient: Maria T Garcia    Procedure Summary     Date:  08/02/18 Room / Location:   [facility]  [facility]    Anesthesia Start:  1233 Anesthesia Stop:      Procedure:  CLOSED REDUCTION METATARSAL FRACTURE (Right ) Diagnosis:      Surgeon:  Waqar Valle MD Provider:  Herbert Fitzgerald MD    Anesthesia Type:  general ASA Status:  4          Anesthesia Type: general  Last vitals  BP   115/75   Temp   98   Pulse   80   Resp   18     SpO2   98     Post Anesthesia Care and Evaluation    Patient location during evaluation: PACU  Patient participation: complete - patient participated  Level of consciousness: awake and responsive to verbal stimuli  Pain score: 2  Pain management: adequate  Airway patency: patent  Anesthetic complications: No anesthetic complications    Cardiovascular status: acceptable  Respiratory status: acceptable  Hydration status: acceptable    Comments: Pt awake and responsive. SV. VSS. Report to RN. Patient Vitals in the past 24 hrs:  08/02/18 1038, BP:109/71, Temp:98.1 °F (36.7 °C), Temp src:Temporal Art, Pulse:87, Resp:18, SpO2:90 %  08/02/18 0815, Pulse:80, SpO2:(!) 87 %  08/02/18 0738, BP:115/63, Pulse:94  08/02/18 0709, BP:112/59, Temp:98 °F (36.7 °C), Temp src:Oral, Pulse:82, Resp:16, SpO2:90 %  08/02/18 0416, BP:110/57, Temp:98.1 °F (36.7 °C), Temp src:Oral, Pulse:91, Resp:16, SpO2:91 %  08/01/18 2349, BP:115/62, Pulse:96, Resp:18, SpO2:91 %  08/01/18 1900, BP:112/71, Temp:98.2 °F (36.8 °C), Temp src:Oral, Pulse:85, Resp:18, SpO2:90 %  08/01/18 1516, BP:101/56, Temp:97.2 °F (36.2 °C), Temp src:Temporal Art, Pulse:78, Resp:22, SpO2:92 %  133/78. p 72. r 16. t 98.1

## 2018-08-02 NOTE — NURSING NOTE
[facility]    Patient Name:  Maria T Garcia  :  1960  DOS:  18    Patient Active Problem List    Diagnosis   • Closed displaced trimalleolar fracture of left ankle [S82.852A]   • Leukocytosis [D72.829]   • Polycythemia [D75.1]   • A-fib (CMS/HCC) [I48.91]   • Chronic anticoagulation [Z79.01]   • Morbid obesity (CMS/HCC) [E66.01]   • Acute combined systolic and diastolic heart failure (CMS/HCC) [I50.41]   • Chronic combined systolic and diastolic congestive heart failure (CMS/HCC) [I50.42]   • Pulmonary embolism (CMS/HCC) [I26.99]   • Sustained SVT (CMS/HCC) [I47.1]     Overview Note:     1. H/O SVT ablation circa  [facility]     • COPD (chronic obstructive pulmonary disease) (CMS/HCC) [J44.9]   • Diabetes mellitus (CMS/HCC) [E11.9]   • Neuropathy [G62.9]   • Tobacco abuse [Z72.0]   • CHF (congestive heart failure) (CMS/HCC) [I50.9]   • Class 3 obesity in adult [E66.9]   • Atrial fibrillation with rapid ventricular response (CMS/HCC) [I48.91]       Pt with recent fall found to have ankle fraction.  Ortho surgery today.  Hx of Mixed S/D HF, atrial flutter/atrial tachycardia currently on tikosyn.  Hx of SVT, COPD on home O2, DM, PE, and tobacco dependence.       Medical records have been reviewed.  Patient is a good candidate for the [facility]. Patient to be scheduled follow up 1 week post discharge.    Echo Results:  · Left ventricular systolic function is normal. Estimated EF = 60%.  · Left ventricular wall thickness is consistent with borderline concentric hypertrophy.  · The left ventricular cavity is borderline dilated.  · Left ventricular diastolic dysfunction (grade I a) consistent with impaired relaxation.  · Right ventricular cavity is moderately dilated.  · Systolic and diastolic flattening of the interventricular septum consistent with right ventricle pressure and volume overload.  · Right atrial cavity size is mildly dilated.  · Mild mitral valve stenosis is  present  · Mild mitral valve regurgitation is present  · Estimated right ventricular systolic pressure from tricuspid regurgitation is moderately elevated (45-55 mmHg).    NYHA Class: III    Heart Failure Quality Measures    ACE I, ARB, ARNI (if EF <40%)     EF 60%.  BP stable    Evidence-based Beta Blocker (EF<40%)    (Bisoprolol, Carvedilol, Metoprolol Succinate)  Normal EF  Metoprolol tartrate for better HR controll with aflutter with use of Tikosyn    Aldosterone Antagonist (EF <40%)  Could be considered  Pt currently using torsemide for volume controll    Heart Failure Education    Pt off floor, unable to provide education     Atrial fibrillation / Atrial flutter:  Quality Measure    CHADS-VASc Score:  CHADS-VASc Risk Assessment            4       Total Score        1 CHF    1 Hypertension    1 DM    1 Sex: Female        Criteria that do not apply:    Age >/= 75    PRIOR STROKE/TIA/THROMBO    Vascular Disease    Age 65-74          Anticoagulation for CHADS-VASc >/=2    Eliquis on hold for ortho surgery.  To be restarted with OK with ortho    Statin Therapy (for patients with a history of CAD, CVA/TIA, PVA, DM)    Pt with hx of DM.  No recent lipids noted.  Pt not on statin.  No allergies recorded. Pt not available to discuss lipid/statin hx.  Will order fasting lipids for am.  Will address outpatient 1 week post d/c.    Atrial fibrillation / Atrial flutter education:   Not available for education.

## 2018-08-02 NOTE — PROGRESS NOTES
[facility] Medicine Services  PROGRESS NOTE    Patient Name: Maria T Garcia  : 1960  MRN: [id number]    Date of Admission: 2018  Length of Stay: 0  Primary Care Physician: Jessica Portillo    Subjective   Subjective     CC:  Ankle fracture    HPI:  Pt sleeping this am, but wakes briefly. Going to OR today for closed reduction of fracture.     Review of Systems  Gen- No fevers, chills  CV- No chest pain, palpitations  Resp- No cough, dyspnea  GI- No N/V/D, abd pain    Otherwise ROS is negative except as mentioned in the HPI.    Objective   Objective     Vital Signs:   Temp:  [97.2 °F (36.2 °C)-98.2 °F (36.8 °C)] 97.4 °F (36.3 °C)  Heart Rate:  [78-96] 86  Resp:  [16-22] 16  BP: (101-116)/(56-85) 116/85        Physical Exam:  Constitutional: No acute distress  HENT: NCAT, mucous membranes moist  Respiratory: Clear to auscultation bilaterally, respiratory effort normal   Cardiovascular: RRR, no murmurs, rubs, or gallops, palpable pedal pulses bilaterally  Gastrointestinal: Positive bowel sounds, soft, nontender, nondistended  Musculoskeletal: No bilateral ankle edema, left ankle in brace   Psychiatric: Appropriate affect, cooperative  Neurologic: Oriented x 3, strength symmetric in all extremities, Cranial Nerves grossly intact to confrontation, speech clear  Skin: No rashes  Results Reviewed:  I have personally reviewed current lab, radiology, and data and agree.      Results from last 7 days  Lab Units 18   WBC 10*3/mm3 11.96* 16.17* 18.13*   HEMOGLOBIN g/dL 13.6 14.6 15.7*   HEMATOCRIT % 43.6 45.7* 48.4*   PLATELETS 10*3/mm3 232 248 256       Results from last 7 days  Lab Units 18   SODIUM mmol/L 139 139 138   POTASSIUM mmol/L 3.5 4.6 3.8   CHLORIDE mmol/L 94* 97* 97*   CO2 mmol/L 38.0* 40.0* 35.0*   BUN mg/dL  18   CREATININE mg/dL 1.04 1.20 1.17   GLUCOSE mg/dL 99 168* 157*   CALCIUM  mg/dL 8.5* 9.1 9.5   ALT (SGPT) U/L  --   --  21   AST (SGOT) U/L  --   --  20     Estimated Creatinine Clearance: 70.2 mL/min (by C-G formula based on SCr of 1.04 mg/dL).  BNP   Date Value Ref Range Status   07/30/2018 18.0 0.0 - 100.0 pg/mL Final     Comment:     Results may be falsely decreased if patient taking Biotin.       Microbiology Results Abnormal     None          Imaging Results (last 24 hours)     Procedure Component Value Units Date/Time    FL C Arm During Surgery [[id number]] Updated:  08/02/18 1306        Results for orders placed during the hospital encounter of 07/30/18   Adult Transthoracic Echo Complete W/ Cont if Necessary Per Protocol    Narrative · Left ventricular systolic function is normal. Estimated EF = 60%.  · Left ventricular wall thickness is consistent with borderline concentric   hypertrophy.  · The left ventricular cavity is borderline dilated.  · Left ventricular diastolic dysfunction (grade I a) consistent with   impaired relaxation.  · Right ventricular cavity is moderately dilated.  · Systolic and diastolic flattening of the interventricular septum   consistent with right ventricle pressure and volume overload.  · Right atrial cavity size is mildly dilated.  · Mild mitral valve stenosis is present  · Mild mitral valve regurgitation is present  · Estimated right ventricular systolic pressure from tricuspid   regurgitation is moderately elevated (45-55 mmHg).          I have reviewed the medications.    Assessment/Plan   Assessment / Plan     Hospital Problem List     COPD (chronic obstructive pulmonary disease) (CMS/HCC)    Diabetes mellitus (CMS/HCC)    Tobacco abuse    Chronic combined systolic and diastolic congestive heart failure (CMS/HCC)    Closed displaced trimalleolar fracture of left ankle    Leukocytosis    Polycythemia    A-fib (CMS/HCC)    Chronic anticoagulation    Morbid obesity (CMS/HCC)             Brief Hospital Course to date:  Maria T Garcia is a 58 y.o.  female with PMH of Afib on Eliquis, DM, COPD, tobacco abuse, CHF presents with left trimalleolar ankle fracture.       Plan:  --To OR today for closed reduction of fracture.    --SSI  --nicotine patch while inpatient  --continue rate control, full dose LMWH for now. Resume Eliquis prior to d/c.   --PT/OT consult    DVT Prophylaxis:  anticoagulated    CODE STATUS:   Code Status and Medical Interventions:   Ordered at: 07/31/18 0145     Level Of Support Discussed With:    Patient     Code Status:    CPR     Medical Interventions (Level of Support Prior to Arrest):    Full       Disposition: I expect the patient to be discharged TBD      Electronically signed by Yuliya Payne MD, 08/02/18, 1:19 PM.

## 2018-08-02 NOTE — ANESTHESIA PREPROCEDURE EVALUATION
Anesthesia Evaluation     Patient summary reviewed and Nursing notes reviewed   NPO Solid Status: > 8 hours  NPO Liquid Status: > 8 hours           Airway   Mallampati: I  TM distance: >3 FB  Neck ROM: full  No difficulty expected  Dental      Pulmonary    (+) pulmonary embolism, a smoker Current, COPD severe,   Lung cancer: home O2.  Cardiovascular     ECG reviewed    (+) hypertension, cardiac stents (?) dysrhythmias Tachycardia, Atrial Fib, CHF,   (-) past MI, CAD, CABG    ROS comment: Normal sinus rhythm  Low voltage QRS  ST abnormality, possible digitalis effect  Prolonged QT  ECHO  EF = 60%.  Concen LVH LV BL  LV DD   (grade I a) consistent with impaired relaxation.  RV moderately dilated.  RV Inc pressure and volume overload. RA mildly dilated.  Mild MVS and MVR   RVSP (TR) 45-55 mmHg.      Neuro/Psych  (-) seizures, TIA, CVA  GI/Hepatic/Renal/Endo    (+) obesity,   diabetes mellitus type 2,   (-) morbid obesity    Musculoskeletal     Abdominal    Substance History      OB/GYN          Other        ROS/Med Hx Other: eliquis                   Anesthesia Plan    ASA 4     general   (Short <10 min GA c Propofol   High FiO2 )  intravenous induction   Anesthetic plan and risks discussed with patient.    Plan discussed with CRNA.

## 2018-08-03 LAB
ANION GAP SERPL CALCULATED.3IONS-SCNC: 8 MMOL/L (ref 3–11)
ARTICHOKE IGE QN: 130 MG/DL (ref 0–130)
BUN BLD-MCNC: 13 MG/DL (ref 9–23)
BUN/CREAT SERPL: 13.3 (ref 7–25)
CALCIUM SPEC-SCNC: 9.6 MG/DL (ref 8.7–10.4)
CHLORIDE SERPL-SCNC: 93 MMOL/L (ref 99–109)
CHOLEST SERPL-MCNC: 183 MG/DL (ref 0–200)
CO2 SERPL-SCNC: 38 MMOL/L (ref 20–31)
CREAT BLD-MCNC: 0.98 MG/DL (ref 0.6–1.3)
DEPRECATED RDW RBC AUTO: 46.5 FL (ref 37–54)
ERYTHROCYTE [DISTWIDTH] IN BLOOD BY AUTOMATED COUNT: 13.1 % (ref 11.3–14.5)
GFR SERPL CREATININE-BSD FRML MDRD: 58 ML/MIN/1.73
GLUCOSE BLD-MCNC: 104 MG/DL (ref 70–100)
GLUCOSE BLDC GLUCOMTR-MCNC: 139 MG/DL (ref 70–130)
GLUCOSE BLDC GLUCOMTR-MCNC: 154 MG/DL (ref 70–130)
GLUCOSE BLDC GLUCOMTR-MCNC: 172 MG/DL (ref 70–130)
GLUCOSE BLDC GLUCOMTR-MCNC: 215 MG/DL (ref 70–130)
HCT VFR BLD AUTO: 43.4 % (ref 34.5–44)
HDLC SERPL-MCNC: 35 MG/DL (ref 40–60)
HGB BLD-MCNC: 13.8 G/DL (ref 11.5–15.5)
MCH RBC QN AUTO: 30.9 PG (ref 27–31)
MCHC RBC AUTO-ENTMCNC: 31.8 G/DL (ref 32–36)
MCV RBC AUTO: 97.1 FL (ref 80–99)
PLATELET # BLD AUTO: 260 10*3/MM3 (ref 150–450)
PMV BLD AUTO: 11 FL (ref 6–12)
POTASSIUM BLD-SCNC: 3.4 MMOL/L (ref 3.5–5.5)
RBC # BLD AUTO: 4.47 10*6/MM3 (ref 3.89–5.14)
SODIUM BLD-SCNC: 139 MMOL/L (ref 132–146)
TRIGL SERPL-MCNC: 188 MG/DL (ref 0–150)
WBC NRBC COR # BLD: 11.67 10*3/MM3 (ref 3.5–10.8)

## 2018-08-03 PROCEDURE — 85027 COMPLETE CBC AUTOMATED: CPT | Performed by: INTERNAL MEDICINE

## 2018-08-03 PROCEDURE — 25010000002 ENOXAPARIN PER 10 MG: Performed by: FAMILY MEDICINE

## 2018-08-03 PROCEDURE — 97161 PT EVAL LOW COMPLEX 20 MIN: CPT

## 2018-08-03 PROCEDURE — 99233 SBSQ HOSP IP/OBS HIGH 50: CPT | Performed by: INTERNAL MEDICINE

## 2018-08-03 PROCEDURE — 82962 GLUCOSE BLOOD TEST: CPT

## 2018-08-03 PROCEDURE — 97535 SELF CARE MNGMENT TRAINING: CPT | Performed by: OCCUPATIONAL THERAPIST

## 2018-08-03 PROCEDURE — 80061 LIPID PANEL: CPT | Performed by: NURSE PRACTITIONER

## 2018-08-03 PROCEDURE — 80048 BASIC METABOLIC PNL TOTAL CA: CPT | Performed by: INTERNAL MEDICINE

## 2018-08-03 PROCEDURE — 25010000002 HYDROMORPHONE PER 4 MG: Performed by: FAMILY MEDICINE

## 2018-08-03 PROCEDURE — 97166 OT EVAL MOD COMPLEX 45 MIN: CPT | Performed by: OCCUPATIONAL THERAPIST

## 2018-08-03 PROCEDURE — 94799 UNLISTED PULMONARY SVC/PX: CPT

## 2018-08-03 RX ADMIN — TORSEMIDE 20 MG: 20 TABLET ORAL at 08:23

## 2018-08-03 RX ADMIN — METOPROLOL TARTRATE 12.5 MG: 25 TABLET, FILM COATED ORAL at 08:23

## 2018-08-03 RX ADMIN — CITALOPRAM HYDROBROMIDE 40 MG: 20 TABLET ORAL at 08:23

## 2018-08-03 RX ADMIN — OXYCODONE HYDROCHLORIDE AND ACETAMINOPHEN 1 TABLET: 5; 325 TABLET ORAL at 16:17

## 2018-08-03 RX ADMIN — INSULIN LISPRO 2 UNITS: 100 INJECTION, SOLUTION INTRAVENOUS; SUBCUTANEOUS at 12:46

## 2018-08-03 RX ADMIN — DOFETILIDE 250 MCG: 0.25 CAPSULE ORAL at 08:22

## 2018-08-03 RX ADMIN — HYDROXYZINE HYDROCHLORIDE 50 MG: 25 TABLET, FILM COATED ORAL at 08:23

## 2018-08-03 RX ADMIN — METOPROLOL TARTRATE 12.5 MG: 25 TABLET, FILM COATED ORAL at 21:08

## 2018-08-03 RX ADMIN — GABAPENTIN 100 MG: 100 CAPSULE ORAL at 08:31

## 2018-08-03 RX ADMIN — DOFETILIDE 250 MCG: 0.25 CAPSULE ORAL at 21:08

## 2018-08-03 RX ADMIN — INSULIN LISPRO 4 UNITS: 100 INJECTION, SOLUTION INTRAVENOUS; SUBCUTANEOUS at 17:08

## 2018-08-03 RX ADMIN — HYDROMORPHONE HYDROCHLORIDE 0.5 MG: 1 INJECTION, SOLUTION INTRAMUSCULAR; INTRAVENOUS; SUBCUTANEOUS at 12:49

## 2018-08-03 RX ADMIN — OXYCODONE HYDROCHLORIDE AND ACETAMINOPHEN 1 TABLET: 5; 325 TABLET ORAL at 21:08

## 2018-08-03 RX ADMIN — ENOXAPARIN SODIUM 110 MG: 120 INJECTION SUBCUTANEOUS at 12:46

## 2018-08-03 RX ADMIN — OXYCODONE HYDROCHLORIDE AND ACETAMINOPHEN 1 TABLET: 5; 325 TABLET ORAL at 08:32

## 2018-08-03 RX ADMIN — ASPIRIN 81 MG: 81 TABLET, COATED ORAL at 08:23

## 2018-08-03 RX ADMIN — CETIRIZINE HYDROCHLORIDE 10 MG: 10 TABLET, FILM COATED ORAL at 08:23

## 2018-08-03 RX ADMIN — PANTOPRAZOLE SODIUM 40 MG: 40 TABLET, DELAYED RELEASE ORAL at 08:23

## 2018-08-03 RX ADMIN — TORSEMIDE 20 MG: 20 TABLET ORAL at 21:08

## 2018-08-03 RX ADMIN — POTASSIUM CHLORIDE 20 MEQ: 750 CAPSULE, EXTENDED RELEASE ORAL at 08:23

## 2018-08-03 RX ADMIN — ENOXAPARIN SODIUM 110 MG: 120 INJECTION SUBCUTANEOUS at 00:22

## 2018-08-03 RX ADMIN — GABAPENTIN 300 MG: 300 CAPSULE ORAL at 21:08

## 2018-08-03 NOTE — PLAN OF CARE
Problem: Patient Care Overview  Goal: Plan of Care Review  Outcome: Ongoing (interventions implemented as appropriate)   08/03/18 1151   Plan of Care Review   Progress improving   OTHER   Outcome Summary Patient able to mobilize out of bed and walk short distance with knee scootter. Recommend continued skilled PT at a Rehab center at d/c   Coping/Psychosocial   Plan of Care Reviewed With patient

## 2018-08-03 NOTE — THERAPY EVALUATION
Acute Care - Occupational Therapy Initial Evaluation  [facility]     Patient Name: Maria T Garcia  : 1960  MRN: [id number]  Today's Date: 8/3/2018  Onset of Illness/Injury or Date of Surgery: 18  Date of Referral to OT: 18  Referring Physician: Tori  (tough down wt bearing)    Admit Date: 2018       ICD-10-CM ICD-9-CM   1. Closed trimalleolar fracture of left ankle, initial encounter S82.852A 824.6   2. Fall, initial encounter W19.XXXA E888.9   3. Impaired mobility and ADLs Z74.09 799.89   4. Impaired functional mobility, balance, gait, and endurance Z74.09 V49.89     Patient Active Problem List   Diagnosis   • Sustained SVT (CMS/HCC)   • COPD (chronic obstructive pulmonary disease) (CMS/HCC)   • Diabetes mellitus (CMS/HCC)   • Neuropathy   • Tobacco abuse   • CHF (congestive heart failure) (CMS/HCC)   • Class 3 obesity in adult   • Atrial fibrillation with rapid ventricular response (CMS/HCC)   • Pulmonary embolism (CMS/HCC)   • Acute combined systolic and diastolic heart failure (CMS/HCC)   • Chronic combined systolic and diastolic congestive heart failure (CMS/HCC)   • Closed displaced trimalleolar fracture of left ankle   • Leukocytosis   • Polycythemia   • A-fib (CMS/HCC)   • Chronic anticoagulation   • Morbid obesity (CMS/HCC)     Past Medical History:   Diagnosis Date   • CHF (congestive heart failure) (CMS/HCC)    • COPD (chronic obstructive pulmonary disease) (CMS/HCC)    • Diabetes mellitus (CMS/HCC)    • Hypertension    • Neuropathy      Past Surgical History:   Procedure Laterality Date   • BREAST SURGERY     •  SECTION     • DENTAL PROCEDURE            OT ASSESSMENT FLOWSHEET (last 72 hours)      Occupational Therapy Evaluation     Row Name 18 0912                   OT Evaluation Time/Intention    Subjective Information no complaints  -ST        Document Type evaluation;therapy note (daily note)  -ST        Mode of Treatment individual therapy;occupational  therapy  -ST        Patient Effort good  -ST        Symptoms Noted During/After Treatment none  -ST           General Information    Patient Profile Reviewed? yes  -ST        Onset of Illness/Injury or Date of Surgery 07/30/18  -ST        Referring Physician DIONNE Otoole MD  -ST        Patient Observations alert;cooperative;agree to therapy  -ST        Patient/Family Observations daughter present  -ST        General Observations of Patient Pt sitting EOB with nsg tech present  -ST        Prior Level of Function independent:;all household mobility;community mobility;transfer;bed mobility;feeding;grooming;dressing;bathing;home management;dependent:;driving;shopping;yard work   limited household mobiility PTA, using rwx @all times  -ST        Equipment Currently Used at Home walker, rolling;rollator;grab bar;raised toilet;shower chair  -ST        Pertinent History of Current Functional Problem Pt to ED after having mechanical fall resulting in L unstable and displaced trimalleolar ankle fx. Pt is now POD 1 and s/p open reduction and placement of splint.   -ST        Existing Precautions/Restrictions oxygen therapy device and L/min;weight bearing;other (see comments)   TDWB LLE  -ST        Risks Reviewed patient and family:;LOB;nausea/vomiting;dizziness;increased discomfort;change in vital signs  -ST        Benefits Reviewed patient and family:;improve function;increase independence;increase strength;increase balance;decrease pain;increase knowledge  -ST        Barriers to Rehab previous functional deficit  -ST           Relationship/Environment    Primary Source of Support/Comfort child(andreia)  -ST        Lives With child(andreia), adult  -ST        Concerns About Impact on Relationships lives with daughter who will be present 24/7 at d/c  -ST           Resource/Environmental Concerns    Current Living Arrangements home/apartment/condo  -ST           Home Main Entrance    Number of Stairs, Main Entrance three  -ST        Stair  Railings, Main Entrance none  -ST           Cognitive Assessment/Interventions    Additional Documentation Cognitive Assessment/Intervention (Group)  -ST           Cognitive Assessment/Intervention- PT/OT    Affect/Mental Status (Cognitive) WNL  -ST        Orientation Status (Cognition) oriented x 4  -ST        Follows Commands (Cognition) follows one step commands;over 90% accuracy  -        Cognitive Function (Cognitive) safety deficit  -ST        Safety Deficit (Cognitive) mild deficit  -ST        Personal Safety Interventions fall prevention program maintained;gait belt;nonskid shoes/slippers when out of bed  -ST           Safety Issues, Functional Mobility    Safety Issues Affecting Function (Mobility) sequencing abilities;positioning of assistive device;judgment  -ST        Impairments Affecting Function (Mobility) balance;strength  -ST           Mobility Assessment/Treatment    Extremity Weight-bearing Status left lower extremity  -ST        Left Lower Extremity (Weight-bearing Status) touch down weight-bearing (TDWB)  -           Bed Mobility Assessment/Treatment    Bed Mobility Assessment/Treatment sit-supine  -ST        Sit-Supine [place] (Bed Mobility) supervision  -ST        Bed Mobility, Safety Issues decreased use of legs for bridging/pushing  -        Assistive Device (Bed Mobility) head of bed elevated  -ST        Comment (Bed Mobility) pt EOB upon arrival; able to use UE's to aid sx LE back into bed  -           Functional Mobility    Functional Mobility- Ind. Level contact guard assist  -        Functional Mobility- Device rolling walker  -        Functional Mobility-Distance (Feet) 6  -ST        Functional Mobility-Maintain WBing able to maintain weight bearing status  -ST        Functional Mobility- Comment able to hop/scoot non-sx LE ~6 feet total (3 and 3 back & forth to [facility])  -           Transfer Assessment/Treatment    Transfer Assessment/Treatment sit-stand  transfer;stand-sit transfer;toilet transfer  -ST        Maintains Weight-bearing Status (Transfers) able to maintain;verbal cues to maintain  -ST        Comment (Transfers) cuing for hand placement  -ST           Sit-Stand Transfer    Sit-Stand [place] (Transfers) minimum assist (75% patient effort)  -ST        Assistive Device (Sit-Stand Transfers) walker, front-wheeled  -ST           Stand-Sit Transfer    Stand-Sit [place] (Transfers) contact guard  -ST        Assistive Device (Stand-Sit Transfers) walker, front-wheeled  -ST           Toilet Transfer    Type (Toilet Transfer) stand-sit;sit-stand  -ST        [place] Level (Toilet Transfer) contact guard  -ST        Assistive Device (Toilet Transfer) commode, bedside without drop arms  -ST           ADL Assessment/Intervention    90294 - OT Self Care/Mgmt Minutes 16  -ST        BADL Assessment/Intervention lower body dressing;toileting;grooming  -ST           Lower Body Dressing Assessment/Training    Lower Body Dressing [place] Level don;doff;socks;supervision  -ST        Lower Body Dressing Position long sitting  -ST           Grooming Assessment/Training    [place] Level (Grooming) set up  -ST        Comment (Grooming) wash hands using wet wipe sitting on BS  -ST           Toileting Assessment/Training    [place] Level (Toileting) perform perineal hygiene;supervision  -ST        Assistive Devices (Toileting) commode, bedside without drop arms  -ST        Toileting Position supported standing  -ST           BADL Safety/Performance    Impairments, BADL Safety/Performance balance  -ST           General ROM    GENERAL ROM COMMENTS [facility]   -ST           General Assessment (Manual Muscle Testing)    Comment, General Manual Muscle Testing (MMT) Assessment [facility]   -           Motor Assessment/Interventions    Additional Documentation Balance (Group)  -ST           Balance    Balance dynamic sitting balance;dynamic standing balance   -ST           Dynamic Sitting Balance    Level of [place], Reaches Outside Midline (Sitting, Dynamic Balance) independent  -ST        Sitting Position, Reaches Outside Midline (Sitting, Dynamic Balance) sitting on edge of bed  -ST           Dynamic Standing Balance    Level of [place], Reaches Outside Midline (Standing, Dynamic Balance) contact guard assist  -ST        Time Able to Maintain Position, Reaches Outside Midline (Standing, Dynamic Balance) 2 to 3 minutes  -ST        Comment, Resists Mild Perturbations (Sitting, Dynamic Balance) use of rwx   -ST           Sensory Assessment/Intervention    Sensory General Assessment no sensation deficits identified  -ST           Positioning and Restraints    Pre-Treatment Position in bed  -ST        Post Treatment Position bed  -ST        In Bed notified nsg;supine;call light within reach;encouraged to call for assist;exit alarm on;with family/caregiver;LUE elevated;with nsg  -ST           Pain Assessment    Additional Documentation Pain Scale: Numbers Pre/Post-Treatment (Group)  -ST           Pain Scale: Numbers Pre/Post-Treatment    Pain Scale: Numbers, Pretreatment 3/10  -ST        Pain Scale: Numbers, Post-Treatment 3/10  -ST        Pain Location - Side Left  -ST        Pain Location - Orientation lower  -ST        Pain Location ankle  -ST           Wound 08/02/18 1312 Right ankle incision    Wound - Properties Group Date first assessed: 08/02/18  -TK Time first assessed: 1312  -TK Side: Right  -TK Location: ankle  -TK Type: incision  -TK       Wound 08/03/18 0800 Left lower ankle surgical    Wound - Properties Group Date first assessed: 08/03/18  -SM Time first assessed: 0800  -SM Side: Left  -SM Orientation: lower  -SM Location: ankle  -SM Type: surgical  -SM       Clinical Impression (OT)    Date of Referral to OT 08/03/18  -ST        OT Diagnosis impaired ADLs  -ST        Prognosis (OT Eval) good  -ST        Patient/Family Goals Statement (OT Eval)  return home  -ST        Criteria for Skilled Therapeutic Interventions Met (OT Eval) yes  -ST        Rehab Potential (OT Eval) good, to achieve stated therapy goals  -ST        Therapy Frequency (OT Eval) daily  -ST        Care Plan Review (OT) evaluation/treatment results reviewed;care plan/treatment goals reviewed;risks/benefits reviewed;current/potential barriers reviewed;patient/other agree to care plan  -ST        Care Plan Review, Other Participant (OT Eval) family  -ST        Anticipated Equipment Needs at Discharge (OT) bedside commode  -ST        Anticipated Discharge Disposition (OT) home with /7 care;home with home health  -ST           Planned OT Interventions    Planned Therapy Interventions (OT Eval) adaptive equipment training;functional balance retraining;occupation/activity based interventions;patient/caregiver education/training;ROM/therapeutic exercise;strengthening exercise;transfer/mobility retraining  -ST           OT Goals    Bed Mobility Goal Selection (OT) bed mobility, OT goal 1  -ST        Transfer Goal Selection (OT) transfer, OT goal 1  -ST        Dressing Goal Selection (OT) dressing, OT goal 1  -ST        Toileting Goal Selection (OT) toileting, OT goal 1  -ST           Bed Mobility Goal 1 (OT)    Activity/Assistive Device (Bed Mobility Goal 1, OT) sit to supine/supine to sit  -ST        [place] Level/Cues Needed (Bed Mobility Goal 1, OT) independent  -ST        Time Frame (Bed Mobility Goal 1, OT) long term goal (LTG);5 days  -ST        Progress/Outcomes (Bed Mobility Goal 1, OT) goal ongoing  -ST           Transfer Goal 1 (OT)    Activity/Assistive Device (Transfer Goal 1, OT) bed-to-chair/chair-to-bed;toilet;walker, rolling  -ST        [place] Level/Cues Needed (Transfer Goal 1, OT) supervision required  -ST        Time Frame (Transfer Goal 1, OT) long term goal (LTG);5 days  -ST        Progress/Outcome (Transfer Goal 1, OT) goal ongoing  -ST           Dressing Goal 1  (OT)    Activity/Assistive Device (Dressing Goal 1, OT) lower body dressing  -ST        [place]/Cues Needed (Dressing Goal 1, OT) independent  -ST        Time Frame (Dressing Goal 1, OT) long term goal (LTG);5 days  -ST        Progress/Outcome (Dressing Goal 1, OT) goal ongoing  -ST           Toileting Goal 1 (OT)    Activity/Device (Toileting Goal 1, OT) adjust/manage clothing;perform perineal hygiene  -ST        [place] Level/Cues Needed (Toileting Goal 1, OT) set-up required  -ST        Time Frame (Toileting Goal 1, OT) long term goal (LTG);5 days  -ST        Progress/Outcome (Toileting Goal 1, OT) goal ongoing  -ST           Patient Education Goal (OT)    Activity (Patient Education Goal, OT) pt to maintain wb'ing status with ADL tasks and transfers   -ST        [place]/Cues/Accuracy (Memory Goal 2, OT) demonstrates adequately;verbalizes understanding  -ST        Time Frame (Patient Education Goal, OT) long term goal (LTG);5 days  -ST        Progress/Outcome (Patient Education Goal, OT) goal ongoing  -ST           Living Environment    Home Accessibility stairs to enter home   walk-in shower  -ST          User Key  (r) = Recorded By, (t) = Taken By, (c) = Cosigned By    Initials Name Effective Dates    Jerica Palmer, OTR 06/10/18 -     Chante Benjamin, RN 06/16/16 -     Siomara Millard RN 06/16/16 -            Occupational Therapy Education     Title: PT OT SLP Therapies (Done)     Topic: Occupational Therapy (Done)     Point: ADL training (Done)     Description: Instruct learner(s) on proper safety adaptation and remediation techniques during self care or transfers.   Instruct in proper use of assistive devices.   Learning Progress Summary     Learner Status Readiness Method Response Comment Documented by    Patient Done Acceptance E,TB,D LOKI,DU role of OT, benefits of actiity, transfers, WB'ing status, hygiene, toileting, BSC transfers, rwx use and safety, home safety, POC ST 08/03/18  1138          Point: Home exercise program (Done)     Description: Instruct learner(s) on appropriate technique for monitoring, assisting and/or progressing therapeutic exercises/activities.   Learning Progress Summary     Learner Status Readiness Method Response Comment Documented by    Patient Done Acceptance TAWANDA,EUSEBIO BOSS DU role of OT, benefits of actiity, transfers, WB'ing status, hygiene, toileting, BSC transfers, rwx use and safety, home safety, POC ST 08/03/18 1138          Point: Precautions (Done)     Description: Instruct learner(s) on prescribed precautions during self-care and functional transfers.   Learning Progress Summary     Learner Status Readiness Method Response Comment Documented by    Patient Done Acceptance E,EUSEBIO BOSS DU role of OT, benefits of actiity, transfers, WB'ing status, hygiene, toileting, BSC transfers, rwx use and safety, home safety, POC ST 08/03/18 1138          Point: Body mechanics (Done)     Description: Instruct learner(s) on proper positioning and spine alignment during self-care, functional mobility activities and/or exercises.   Learning Progress Summary     Learner Status Readiness Method Response Comment Documented by    Patient Done Acceptance TAWANDA,EUSEBIO BOSS DU role of OT, benefits of actiity, transfers, WB'ing status, hygiene, toileting, BSC transfers, rwx use and safety, home safety, POC ST 08/03/18 1138                      User Key     Initials Effective Dates Name Provider Type Discipline    ST 06/10/18 -  Jerica Win OTR Occupational Therapist OT                  OT Recommendation and Plan  Outcome Summary/Treatment Plan (OT)  Anticipated Equipment Needs at Discharge (OT): bedside commode  Anticipated Discharge Disposition (OT): home with 24/7 care, home with home health  Planned Therapy Interventions (OT Eval): adaptive equipment training, functional balance retraining, occupation/activity based interventions, patient/caregiver education/training, ROM/therapeutic  exercise, strengthening exercise, transfer/mobility retraining  Therapy Frequency (OT Eval): daily  Plan of Care Review  Plan of Care Reviewed With: patient  Plan of Care Reviewed With: patient  Outcome Summary: Pt presents s/p fall with resulting open reduction tx. Pt min A/CGA with rwx for bed mobility, standing and transfer to [facility]<->bed. Pt able to maintain WB'ing status s/p education. Also able to complete toileting/hygiene. Pt desires d/c home with 24/7 care. D/C plan should be appropriate           Outcome Measures     Row Name 08/03/18 0912             How much help from another is currently needed...    Putting on and taking off regular lower body clothing? 3  -ST      Bathing (including washing, rinsing, and drying) 3  -ST      Toileting (which includes using toilet bed pan or urinal) 3  -ST      Putting on and taking off regular upper body clothing 3  -ST      Taking care of personal grooming (such as brushing teeth) 4  -ST      Eating meals 4  -ST      Score 20  -ST         Functional Assessment    Outcome Measure Options AM-PAC 6 Clicks Daily Activity (OT)  -ST        User Key  (r) = Recorded By, (t) = Taken By, (c) = Cosigned By    Initials Name Provider Type    ST Jerica Win OTR Occupational Therapist          Time Calculation:   OT Start Time: 0912  Therapy Suggested Charges     Code   Minutes Charges    01439 (CPT®) Hc Ot Neuromusc Re Education Ea 15 Min      09222 (CPT®) Hc Ot Ther Proc Ea 15 Min      12952 (CPT®) Hc Ot Therapeutic Act Ea 15 Min      97683 (CPT®) Hc Ot Manual Therapy Ea 15 Min      00386 (CPT®) Hc Ot Iontophoresis Ea 15 Min      76237 (CPT®) Hc Ot Elec Stim Ea-Per 15 Min      51538 (CPT®) Hc Ot Ultrasound Ea 15 Min      01531 (CPT®) Hc Ot Self Care/Mgmt/Train Ea 15 Min 16 1    Total  16 1        Therapy Charges for Today     Code Description Service Date Service Provider Modifiers Qty    [id number] HC OT SELF CARE/MGMT/TRAIN EA 15 MIN 8/3/2018 Jerica Win OTR GO 1     [id number]  OT EVAL MOD COMPLEXITY 3 8/3/2018 Jerica Win, OTR GO 1               Jerica DACOSTA. GRAEME Win  8/3/2018

## 2018-08-03 NOTE — PROGRESS NOTES
[facility] Medicine Services  PROGRESS NOTE    Patient Name: Maria T Garcia  : 1960  MRN: [id number]    Date of Admission: 2018  Length of Stay: 1  Primary Care Physician: Jessica Portillo    Subjective   Subjective     CC:  Ankle fracture    HPI:  Pt working with PT when I saw her. No issues overnight. Pain is much better now.    Review of Systems  Gen- No fevers, chills  CV- No chest pain, palpitations  Resp- No cough, dyspnea  GI- No N/V/D, abd pain    Otherwise ROS is negative except as mentioned in the HPI.    Objective   Objective     Vital Signs:   Temp:  [97.4 °F (36.3 °C)-99 °F (37.2 °C)] 97.6 °F (36.4 °C)  Heart Rate:  [] 80  Resp:  [16-22] 16  BP: ()/(54-97) 99/72        Physical Exam:  Constitutional: No acute distress  HENT: NCAT, mucous membranes moist  Respiratory: Clear to auscultation bilaterally, respiratory effort normal   Cardiovascular: RRR, no murmurs, rubs, or gallops, palpable pedal pulses bilaterally  Gastrointestinal: Positive bowel sounds, soft, nontender, nondistended  Musculoskeletal: No bilateral ankle edema, left ankle in brace   Psychiatric: Appropriate affect, cooperative  Neurologic: Oriented x 3, strength symmetric in all extremities, Cranial Nerves grossly intact to confrontation, speech clear  Skin: No rashes  Results Reviewed:  I have personally reviewed current lab, radiology, and data and agree.      Results from last 7 days  Lab Units 18  0648 18  0812   WBC 10*3/mm3 11.67* 11.96* 16.17*   HEMOGLOBIN g/dL 13.8 13.6 14.6   HEMATOCRIT % 43.4 43.6 45.7*   PLATELETS 10*3/mm3 260 232 248       Results from last 7 days  Lab Units 18  0649 18  0446 18  0812 18  2036   SODIUM mmol/L 139 139 139 138   POTASSIUM mmol/L 3.4* 3.5 4.6 3.8   CHLORIDE mmol/L 93* 94* 97* 97*   CO2 mmol/L 38.0* 38.0* 40.0* 35.0*   BUN mg/dL 13 16 18 18   CREATININE mg/dL 0.98 1.04 1.20 1.17   GLUCOSE mg/dL  104* 99 168* 157*   CALCIUM mg/dL 9.6 8.5* 9.1 9.5   ALT (SGPT) U/L  --   --   --  21   AST (SGOT) U/L  --   --   --  20     Estimated Creatinine Clearance: 74.5 mL/min (by C-G formula based on SCr of 0.98 mg/dL).  No results found for: BNP    Microbiology Results Abnormal     None          Imaging Results (last 24 hours)     Procedure Component Value Units Date/Time    FL C Arm During Surgery [[id number]] Collected:  08/02/18 1339     Updated:  08/02/18 1439    Narrative:       EXAMINATION: FL C ARM DURING SURGERY- 08/02/2018     INDICATION: Closed Reduction Metatarsal Fracture; S82.852A-Displaced  trimalleolar fracture of left lower leg, initial encounter for closed  fracture; W19.XXXA-Unspecified fall, initial encounter; postreduction  imaging     COMPARISON: NONE     FINDINGS: 5 seconds of fluoroscopy and 2 images used for closed  reduction of an ankle fracture. Please see the procedure report for full  details.       Impression:       Fluoroscopy used for re casting of an ankle fracture. Please  see the procedure report for full details.     D:  08/02/2018  E:  08/02/2018         This report was finalized on 8/2/2018 2:37 PM by Dr. Adrianna Benitez MD.           Results for orders placed during the hospital encounter of 07/30/18   Adult Transthoracic Echo Complete W/ Cont if Necessary Per Protocol    Narrative · Left ventricular systolic function is normal. Estimated EF = 60%.  · Left ventricular wall thickness is consistent with borderline concentric   hypertrophy.  · The left ventricular cavity is borderline dilated.  · Left ventricular diastolic dysfunction (grade I a) consistent with   impaired relaxation.  · Right ventricular cavity is moderately dilated.  · Systolic and diastolic flattening of the interventricular septum   consistent with right ventricle pressure and volume overload.  · Right atrial cavity size is mildly dilated.  · Mild mitral valve stenosis is present  · Mild mitral valve  regurgitation is present  · Estimated right ventricular systolic pressure from tricuspid   regurgitation is moderately elevated (45-55 mmHg).          I have reviewed the medications.    Assessment/Plan   Assessment / Plan     Hospital Problem List     COPD (chronic obstructive pulmonary disease) (CMS/[facility])    Diabetes mellitus (CMS/[facility])    Tobacco abuse    Chronic combined systolic and diastolic congestive heart failure (CMS/[facility])    Closed displaced trimalleolar fracture of left ankle    Leukocytosis    Polycythemia    A-fib (CMS/[facility])    Chronic anticoagulation    Morbid obesity (CMS/[facility])             Brief Hospital Course to date:  Maria T Garcia is a 58 y.o. female with PMH of Afib on Eliquis, DM, COPD, tobacco abuse, CHF presents with left trimalleolar ankle fracture.       Plan:  --s/p closed reduction of fracture.  PT/OT following.   --SSI  --nicotine patch while inpatient  --continue rate control, full dose LMWH for now. Resume Eliquis prior to d/c.   --PT/OT consulted    DVT Prophylaxis:  anticoagulated    CODE STATUS:   Code Status and Medical Interventions:   Ordered at: 07/31/18 0145     Level Of Support Discussed With:    Patient     Code Status:    CPR     Medical Interventions (Level of Support Prior to Arrest):    Full       Disposition: I expect the patient to be discharged to inpatient rehab      Electronically signed by Yuliya Payne MD, 08/03/18, 1:11 PM.

## 2018-08-03 NOTE — DISCHARGE PLACEMENT REQUEST
Maria T Garcia  (58 y.o. Female)     SABA Wilson Case Manager, [phone]      [address] [address]  Phone:  [phone]  Fax:   Date: Aug 3, 2018      Ambulatory Referral to Home Health     Patient:  Maria T Garcia MRN:  [id number]   [address] [place] [id number] :  1960  SSN:    Phone: [phone] Sex:  F      INSURANCE PAYOR PLAN GROUP # SUBSCRIBER ID   Primary:    MEDICARE [id number]   [id number]      Referring Provider Information:  GAYLE PAIZ Phone: [phone] Fax:       Referral Information:   # Visits:  1 Referral Type: Home Health [42]   Urgency:  Routine Referral Reason: Specialty Services Required   Start Date: Aug 3, 2018 End Date:  To be determined by Insurer   Diagnosis: Closed trimalleolar fracture of left ankle, initial encounter (S82.852A [ICD-10-CM] 824.6 [ICD-9-CM])  Impaired functional mobility, balance, gait, and endurance (Z74.09 [ICD-10-CM] V49.89 [ICD-9-CM])      Refer to Dept:   Refer to Provider:   Refer to Facility:  [facility]     Face to Face Visit Date: 8/3/2018  Follow-up Provider for Plan of Care? I will be treating the patient on an ongoing basis.  Please send me the Plan of Care for signature.  Follow-up Provider: GAYLE PAIZ [[id number]]  Reason/Clinical Findings: s/p ankle fracture  Describe mobility limitations that make leaving home difficult: impaired functional mobility, balance, gait, and endurance  Nursing/Therapeutic Services Requested: Physical Therapy  Nursing/Therapeutic Services Requested: Occupational Therapy  PT orders: Gait Training  PT orders: Transfer training  PT orders: Strengthening  Weight Bearing Status: Non-Weight Bearing  Occupational orders: Strengthening     This document serves as a request of services and does not constitute Insurance authorization or approval of services.  To determine eligibility, please contact the members Insurance carrier to verify  "and review coverage.     If you have medical questions regarding this request for services. Please contact [address] at [phone] between the hours of 8:00am - 5:00pm (Mon-Fri).             Verbal Order Mode: Per protocol: cosign required  Authorizing Provider: Waqar Valle MD  Authorizing Provider's NPI: [id number]     Order Entered By: Katie Kearns RN 8/3/2018  2:42 PM                        Date of Birth Social Security Number Address Home Phone MRN    1960  [address] [place] [address] [phone] [id number]    [employer] Status          Latter-day Single       Admission Date Admission Type Admitting Provider Attending Provider Department, Room/Bed    7/30/18 Emergency Yuliya Payne MD Howard, Gabriela Kirk, MD [address], S379/1    Discharge Date Discharge Disposition Discharge Destination                       Attending Provider:  Yuliya Payne MD    Allergies:  Codeine, Morphine And Related, Naproxen    Isolation:  None   Infection:  None   Code Status:  CPR    Ht:  157.5 cm (62\")   Wt:  111 kg (245 lb)    Admission Cmt:  None   Principal Problem:  None                Active Insurance as of 7/30/2018     Primary Coverage     Payor Plan Insurance Group Employer/Plan Group    MEDICARE MEDICARE A & B      Payor Plan Address Payor Plan Phone Number Effective From Effective To    [address] [phone] 12/1/2017     [facility] [address]       Subscriber Name Subscriber Birth Date Member ID       MARIA T GARCIA 1960 [id number]                 Emergency Contacts      (Rel.) Home Phone Work Phone Mobile Phone    Sophia Garcia (Daughter) [phone] -- --            Insurance Information                MEDICARE/MEDICARE A & B Phone: [phone]    Subscriber: Maria T Garcia Subscriber#: [id number]    Group#:  Precert#:              History & Physical      Day, Nicolasa FAGAN MD at 7/31/2018  1:53 AM      "         [facility] Medicine Services  HISTORY AND PHYSICAL    Patient Name: Maria T Garcia  : 1960  MRN: [id number]  Primary Care Physician: Jessica Portillo    Subjective   Subjective     Chief Complaint:  fall    HPI:  Maria T Garcia is a 58 y.o. female who states she was using her walker and became dizzy and fell but did not lose consciousness.  Pt states she has this sensation often and is told it is likely her diabetes.  Denies any cp or palpitations but does have hx of afib.  Pt's primary c/o now is her ankle pain; lives w/ eveline who provides most of her care.  Pt c/o pain but states she is unable to tolerate pain meds.  Would like to try extra neurontin for pain control.     Review of Systems      Otherwise 10-system ROS reviewed and is negative except as mentioned in the HPI.    Personal History     Past Medical History:   Diagnosis Date   • CHF (congestive heart failure) (CMS/HCC)    • COPD (chronic obstructive pulmonary disease) (CMS/[facility])    • Diabetes mellitus (CMS/HCC)    • Hypertension    • Neuropathy        Past Surgical History:   Procedure Laterality Date   • BREAST SURGERY     •  SECTION     • DENTAL PROCEDURE         Family History: family history is not on file.     Social History:  reports that she has been smoking Cigarettes.  She has never used smokeless tobacco. She reports that she does not drink alcohol or use drugs.  Social History     Social History Narrative   • No narrative on file       Medications:    (Not in a hospital admission)    Allergies   Allergen Reactions   • Codeine Shortness Of Breath   • Morphine And Related Shortness Of Breath   • Naproxen Hives       Objective   Objective     Vital Signs:   Temp:  [97.5 °F (36.4 °C)] 97.5 °F (36.4 °C)  Heart Rate:  [72-98] 79  Resp:  [22] 22  BP: (108-148)/(66-87) 123/66        Physical Exam   Gen; chronically ill appearing ; o2 in place  Heent; perrla, eomi, pasty mm; poor color  Cv; irr, no mrg  L;  end exp wheeze t/o, no tachypnea  Abd; soft, obese, ntnd  Ext; lle immobilized, sensation intact  Neuro; grossly intact     Results Reviewed:  I have personally reviewed current lab, radiology, and data and agree.      Results from last 7 days  Lab Units 07/30/18 2036   WBC 10*3/mm3 18.13*   HEMOGLOBIN g/dL 15.7*   HEMATOCRIT % 48.4*   PLATELETS 10*3/mm3 256       Results from last 7 days  Lab Units 07/30/18 2036   SODIUM mmol/L 138   POTASSIUM mmol/L 3.8   CHLORIDE mmol/L 97*   CO2 mmol/L 35.0*   BUN mg/dL 18   CREATININE mg/dL 1.17   GLUCOSE mg/dL 157*   CALCIUM mg/dL 9.5   ALT (SGPT) U/L 21   AST (SGOT) U/L 20     Estimated Creatinine Clearance: 64.7 mL/min (by C-G formula based on SCr of 1.17 mg/dL).  Brief Urine Lab Results  (Last result in the past 365 days)      Color   Clarity   Blood   Leuk Est   Nitrite   Protein   CREAT   Urine HCG        07/30/18 2252 Yellow Clear Negative Trace(A) Negative Negative             BNP   Date Value Ref Range Status   07/30/2018 18.0 0.0 - 100.0 pg/mL Final     Comment:     Results may be falsely decreased if patient taking Biotin.     Imaging Results (last 24 hours)     Procedure Component Value Units Date/Time    XR Tibia Fibula 2 View Left [[id number]] Collected:  07/30/18 2116     Updated:  07/30/18 2317    Narrative:       EXAM:  XR Left Tibia and Fibula, 2 Views    CLINICAL HISTORY:  Pain; Lower leg; Left; Additional info: Fall, pain    TECHNIQUE:  Frontal and lateral views of the left tibia and fibula.    COMPARISON:  No relevant prior studies available.    FINDINGS:  Bones/joints:  Trimalleolar fracture of the ankle with lateral and posterior   displacement.  Proximal tibia and fibula appear intact.    Soft tissues:  Diffuse soft tissue swelling.        Impression:       Trimalleolar fracture of the ankle with lateral and posterior displacement.   Proximal tibia and fibula appear intact.      THIS DOCUMENT HAS BEEN ELECTRONICALLY SIGNED BY ANAID CASTILLO MD    XR Ankle  3+ View Left [[id number]] Collected:  07/30/18 2115     Updated:  07/30/18 2315    Narrative:       EXAM:  XR Left Ankle Complete, 3 or More Views    CLINICAL HISTORY:  Pain; Ankle; Left; Additional info: Left ankle pain    TECHNIQUE:  Frontal, lateral and oblique views of the left ankle.    COMPARISON:  No relevant prior studies available.    FINDINGS:  Bones/joints:  There is fracture through the base of the medial malleolus.    There is fracture through the lateral malleolus superior to the level of the   talar dome.  Coronal oriented fracture through the posterior malleolus.  The   talus, medial malleolus fragment and lateral malleolus fragments are displaced   lateral.  No dislocation.  Soft tissues:  Diffuse soft tissue swelling.      Impression:       Trimalleolar fracture as above.    THIS DOCUMENT HAS BEEN ELECTRONICALLY SIGNED BY ANAID CASTILLO MD    XR Chest 1 View [[id number]] Collected:  07/30/18 2036     Updated:  07/30/18 2201    Narrative:       EXAM:    XR Chest, 1 View    EXAM DATE/TIME:    7/30/2018 8:36 PM    CLINICAL HISTORY:    58 years old, female; Pain; Chest pain; Additional info: Chest pain triage   protocol    TECHNIQUE:    Frontal view of the chest.    COMPARISON:    CR - XR CHEST 1 VW 2018-03-15 06:05    FINDINGS:    Lungs:  Increased central pulmonary and interstitial central markings.    Pleural space:  Unremarkable.  No pneumothorax.    Heart:  Cardiomegaly.    Mediastinum:  Unremarkable.    Bones/joints:  Unremarkable.      Impression:       Cardiomegaly with pulmonary cephalization.    THIS DOCUMENT HAS BEEN ELECTRONICALLY SIGNED BY HONG ORTEGA MD        Results for orders placed during the hospital encounter of 12/21/17   Adult Transesophageal Echo (MARY) W/ Cont if Necessary Per Protocol    Narrative · Left ventricular systolic function is low normal.  · Left atrial cavity size is dilated.  · Moderate mitral valve regurgitation is present  · Right ventricular cavity is dilated.     "      Assessment/Plan   Assessment / Plan     Hospital Problem List     COPD (chronic obstructive pulmonary disease) (CMS/[facility])    Diabetes mellitus (CMS/[facility])    Tobacco abuse    Acute combined systolic and diastolic congestive heart failure (CMS/[facility])    Closed trimalleolar fracture of left ankle    Leukocytosis    Polycythemia    A-fib (CMS/[facility])    Chronic anticoagulation            Assessment & Plan:  57 y/o female here w/ left trimalleolar fx s/p fall;   1. Trimalleolar fracture L; Dr. Valle contacted by ER.  Will see in a.m.  Will attempt pain control w/ inc neurontin dose per pt request.    2. Afib/chronic anticoagulation; will need to hold if surgical intervention needed however pt would be very high risk for surgical intervention given her multiple comorbidities and hx of \"problems w/ anesthesia\" in past.  3. Copd/ongoing tob abuse; nebs, jerry patch; cessation discussed.  4. Leukocytosis; ua and cxr negative.  Repeat in a.m.   5. DM; ssi    DVT prophylaxis:on chronic AC    CODE STATUS:    Code Status and Medical Interventions:   Ordered at: 07/31/18 0145     Level Of Support Discussed With:    Patient     Code Status:    CPR     Medical Interventions (Level of Support Prior to Arrest):    Full       Admission Status:  I believe this patient meets  INPATIENT status due to the need for care which can only be reasonably provided in an hospital setting such as aggressive/expedited ancillary services and/or consultation services, the necessity for IV medications, close physician monitoring and/or the possible need for procedures.  In such, I feel patient’s risk for adverse outcomes and need for care warrant INPATIENT evaluation and predict the patient’s care encounter to likely last beyond 2 midnights.      Electronically signed by Nicolasa Dang MD, 07/31/18, 1:53 AM.          Electronically signed by Nicolasa Dang MD at 7/31/2018  2:05 AM          Physician Progress Notes (last 24 hours) (Notes from 8/2/2018  " 2:44 PM through 8/3/2018  2:44 PM)      Yuliya Payne MD at 8/3/2018  9:11 AM              [facility] Medicine Services  PROGRESS NOTE    Patient Name: Maria T Garcia  : 1960  MRN: [id number]    Date of Admission: 2018  Length of Stay: 1  Primary Care Physician: Jessica Portillo    Subjective   Subjective     CC:  Ankle fracture    HPI:  Pt working with PT when I saw her. No issues overnight. Pain is much better now.    Review of Systems  Gen- No fevers, chills  CV- No chest pain, palpitations  Resp- No cough, dyspnea  GI- No N/V/D, abd pain    Otherwise ROS is negative except as mentioned in the HPI.    Objective   Objective     Vital Signs:   Temp:  [97.4 °F (36.3 °C)-99 °F (37.2 °C)] 97.6 °F (36.4 °C)  Heart Rate:  [] 80  Resp:  [16-22] 16  BP: ()/(54-97) 99/72        Physical Exam:  Constitutional: No acute distress  HENT: NCAT, mucous membranes moist  Respiratory: Clear to auscultation bilaterally, respiratory effort normal   Cardiovascular: RRR, no murmurs, rubs, or gallops, palpable pedal pulses bilaterally  Gastrointestinal: Positive bowel sounds, soft, nontender, nondistended  Musculoskeletal: No bilateral ankle edema, left ankle in brace   Psychiatric: Appropriate affect, cooperative  Neurologic: Oriented x 3, strength symmetric in all extremities, Cranial Nerves grossly intact to confrontation, speech clear  Skin: No rashes  Results Reviewed:  I have personally reviewed current lab, radiology, and data and agree.      Results from last 7 days  Lab Units 18  0648 18  0446 18  0812   WBC 10*3/mm3 11.67* 11.96* 16.17*   HEMOGLOBIN g/dL 13.8 13.6 14.6   HEMATOCRIT % 43.4 43.6 45.7*   PLATELETS 10*3/mm3 260 232 248       Results from last 7 days  Lab Units 18  0649 18  0446 18  0812 18  2036   SODIUM mmol/L 139 139 139 138   POTASSIUM mmol/L 3.4* 3.5 4.6 3.8   CHLORIDE mmol/L 93* 94* 97* 97*   CO2 mmol/L 38.0*  38.0* 40.0* 35.0*   BUN mg/dL 13 16 18 18   CREATININE mg/dL 0.98 1.04 1.20 1.17   GLUCOSE mg/dL 104* 99 168* 157*   CALCIUM mg/dL 9.6 8.5* 9.1 9.5   ALT (SGPT) U/L  --   --   --  21   AST (SGOT) U/L  --   --   --  20     Estimated Creatinine Clearance: 74.5 mL/min (by C-G formula based on SCr of 0.98 mg/dL).  No results found for: BNP    Microbiology Results Abnormal     None          Imaging Results (last 24 hours)     Procedure Component Value Units Date/Time    FL C Arm During Surgery [[id number]] Collected:  08/02/18 1339     Updated:  08/02/18 1439    Narrative:       EXAMINATION: FL C ARM DURING SURGERY- 08/02/2018     INDICATION: Closed Reduction Metatarsal Fracture; S82.852A-Displaced  trimalleolar fracture of left lower leg, initial encounter for closed  fracture; W19.XXXA-Unspecified fall, initial encounter; postreduction  imaging     COMPARISON: NONE     FINDINGS: 5 seconds of fluoroscopy and 2 images used for closed  reduction of an ankle fracture. Please see the procedure report for full  details.       Impression:       Fluoroscopy used for re casting of an ankle fracture. Please  see the procedure report for full details.     D:  08/02/2018  E:  08/02/2018         This report was finalized on 8/2/2018 2:37 PM by Dr. Adrianna Benitez MD.           Results for orders placed during the hospital encounter of 07/30/18   Adult Transthoracic Echo Complete W/ Cont if Necessary Per Protocol    Narrative · Left ventricular systolic function is normal. Estimated EF = 60%.  · Left ventricular wall thickness is consistent with borderline concentric   hypertrophy.  · The left ventricular cavity is borderline dilated.  · Left ventricular diastolic dysfunction (grade I a) consistent with   impaired relaxation.  · Right ventricular cavity is moderately dilated.  · Systolic and diastolic flattening of the interventricular septum   consistent with right ventricle pressure and volume overload.  · Right atrial cavity  size is mildly dilated.  · Mild mitral valve stenosis is present  · Mild mitral valve regurgitation is present  · Estimated right ventricular systolic pressure from tricuspid   regurgitation is moderately elevated (45-55 mmHg).          I have reviewed the medications.    Assessment/Plan   Assessment / Plan     Hospital Problem List     COPD (chronic obstructive pulmonary disease) (CMS/[facility])    Diabetes mellitus (CMS/[facility])    Tobacco abuse    Chronic combined systolic and diastolic congestive heart failure (CMS/[facility])    Closed displaced trimalleolar fracture of left ankle    Leukocytosis    Polycythemia    A-fib (CMS/[facility])    Chronic anticoagulation    Morbid obesity (CMS/[facility])             Brief Hospital Course to date:  Maria T Garcia is a 58 y.o. female with PMH of Afib on Eliquis, DM, COPD, tobacco abuse, CHF presents with left trimalleolar ankle fracture.       Plan:  --s/p closed reduction of fracture.  PT/OT following.   --SSI  --nicotine patch while inpatient  --continue rate control, full dose LMWH for now. Resume Eliquis prior to d/c.   --PT/OT consulted    DVT Prophylaxis:  anticoagulated    CODE STATUS:   Code Status and Medical Interventions:   Ordered at: 07/31/18 0145     Level Of Support Discussed With:    Patient     Code Status:    CPR     Medical Interventions (Level of Support Prior to Arrest):    Full       Disposition: I expect the patient to be discharged to inpatient rehab      Electronically signed by Yuliya Payne MD, 08/03/18, 1:11 PM.      Electronically signed by Yuliya Payne MD at 8/3/2018  1:13 PM       Consult Notes (last 24 hours) (Notes from 8/2/2018  2:44 PM through 8/3/2018  2:44 PM)     No notes of this type exist for this encounter.           Physical Therapy Notes (last 24 hours) (Notes from 8/2/2018  2:44 PM through 8/3/2018  2:44 PM)      Yeison Demarco PT at 8/3/2018 11:52 AM  Version 1 of 1         Problem: Patient Care Overview  Goal: Plan of Care  Review  Outcome: Ongoing (interventions implemented as appropriate)   18 1151   Plan of Care Review   Progress improving   OTHER   Outcome Summary Patient able to mobilize out of bed and walk short distance with knee scootter. Recommend continued skilled PT at a Rehab center at d/c   Coping/Psychosocial   Plan of Care Reviewed With patient           Electronically signed by Yeison Demarco PT at 8/3/2018 11:52 AM     Yeison Demarco PT at 8/3/2018 11:53 AM  Version 1 of 1         Acute Care - Physical Therapy Initial Evaluation  [facility]     Patient Name: Maria T Garcia  : 1960  MRN: [id number]  Today's Date: 8/3/2018   Onset of Illness/Injury or Date of Surgery: 18  Date of Referral to PT: 18  Referring Physician: Tori  (tough down wt bearing)      Admit Date: 2018    Visit Dx:     ICD-10-CM ICD-9-CM   1. Closed trimalleolar fracture of left ankle, initial encounter S82.852A 824.6   2. Fall, initial encounter W19.XXXA E888.9   3. Impaired mobility and ADLs Z74.09 799.89   4. Impaired functional mobility, balance, gait, and endurance Z74.09 V49.89     Patient Active Problem List   Diagnosis   • Sustained SVT (CMS/HCC)   • COPD (chronic obstructive pulmonary disease) (CMS/HCC)   • Diabetes mellitus (CMS/HCC)   • Neuropathy   • Tobacco abuse   • CHF (congestive heart failure) (CMS/HCC)   • Class 3 obesity in adult   • Atrial fibrillation with rapid ventricular response (CMS/HCC)   • Pulmonary embolism (CMS/HCC)   • Acute combined systolic and diastolic heart failure (CMS/HCC)   • Chronic combined systolic and diastolic congestive heart failure (CMS/HCC)   • Closed displaced trimalleolar fracture of left ankle   • Leukocytosis   • Polycythemia   • A-fib (CMS/HCC)   • Chronic anticoagulation   • Morbid obesity (CMS/HCC)     Past Medical History:   Diagnosis Date   • CHF (congestive heart failure) (CMS/HCC)    • COPD (chronic obstructive pulmonary disease) (CMS/HCC)    • Diabetes  mellitus (CMS/HCC)    • Hypertension    • Neuropathy      Past Surgical History:   Procedure Laterality Date   • BREAST SURGERY     •  SECTION     • DENTAL PROCEDURE          PT ASSESSMENT (last 12 hours)      Physical Therapy Evaluation     Row Name 18 1100 18       PT Evaluation Time/Intention    Subjective Information complains of  -SC complains of;pain  -SC    Document Type  -- evaluation  -SC    Mode of Treatment  -- physical therapy  -SC    Patient Effort  -- good  -SC    Symptoms Noted During/After Treatment  -- none  -SC    Row Name 18 0937          General Information    Patient Profile Reviewed? yes  -SC     Onset of Illness/Injury or Date of Surgery 18  -SC     Referring Physician ricardo Valle wt bearing  -SC     Patient Observations alert;cooperative;agree to therapy  -SC     Patient/Family Observations daughter  -SC     General Observations of Patient in bed  -SC     Prior Level of Function independent:;gait;all household mobility   uses walker  -SC     Equipment Currently Used at Home walker, rolling;rollator;grab bar;raised toilet;shower chair  -SC     Pertinent History of Current Functional Problem L ankle fx after fall at home/ s/p Closed reductions  -SC     Existing Precautions/Restrictions oxygen therapy device and L/min;weight bearing;other (see comments)   TDWB L LE  -SC     Risks Reviewed patient:;increased discomfort;change in vital signs;increased drainage  -SC     Benefits Reviewed patient:;improve function;increase independence;increase strength  -SC     Barriers to Rehab previous functional deficit  -SC     Row Name 18 0937          Relationship/Environment    Primary Source of Support/Comfort child(andreia)  -SC     Lives With child(andreia), adult  -SC     Row Name 18 0937          Resource/Environmental Concerns    Current Living Arrangements home/apartment/condo  -SC     Row Name 18 0937          Home Main Entrance    Number of  Stairs, Main Entrance three   can do one at a time with walker  -[facility] Name 08/03/18 0937          Cognitive Assessment/Intervention- PT/OT    Orientation Status (Cognition) oriented x 4  -SC     Follows Commands (Cognition) follows one step commands;over 90% accuracy  -SC     Cognitive Function (Cognitive) safety deficit  -SC     Safety Deficit (Cognitive) mild deficit;insight into deficits/self awareness  -[facility] Name 08/03/18 0937          Safety Issues, Functional Mobility    Safety Issues Affecting Function (Mobility) judgment  -SC     Impairments Affecting Function (Mobility) balance;pain  -[facility] Name 08/03/18 0937          Mobility Assessment/Treatment    Extremity Weight-bearing Status left lower extremity  -SC     Left Lower Extremity (Weight-bearing Status) touch down weight-bearing (TDWB)  -[facility] Name 08/03/18 1100          Bed Mobility Assessment/Treatment    Bed Mobility Assessment/Treatment scooting/bridging  -SC     Scooting/Bridging Henriette (Bed Mobility) independent  -SC     Sit-Supine Henriette (Bed Mobility) conditional independence  -SC     Bed Mobility, Safety Issues decreased use of legs for bridging/pushing  -SC     Comment (Bed Mobility) to EOB with cueing  -[facility] Name 08/03/18 1100          Transfer Assessment/Treatment    Transfer Assessment/Treatment sit-stand transfer;stand-sit transfer  -SC     Comment (Transfers) cues for sequencing on/off knee walker -- some contact assist to guide L leg onto knee pad  -SC     Sit-Stand Henriette (Transfers) contact guard;verbal cues  -SC     Stand-Sit Henriette (Transfers) contact guard;verbal cues  -[facility] Name 08/03/18 1100          Sit-Stand Transfer    Assistive Device (Sit-Stand Transfers) other (see comments)   knee walker  -[facility] Name 08/03/18 1100          Stand-Sit Transfer    Assistive Device (Stand-Sit Transfers) --   knee walker  -[facility] Name 08/03/18 1100          Gait/Stairs  Assessment/Training    Gait/Stairs Assessment/Training gait pattern  -SC     Elnora Level (Gait) contact guard;verbal cues  -SC     Assistive Device (Gait) walker, knee scooter  -SC     Distance in Feet (Gait) 25   oxygen on patient  -SC     Pattern (Gait) step-through  -SC     Deviations/Abnormal Patterns (Gait) jam decreased  -SC     Comment (Gait/Stairs) cues for using brakes and sequencing waker. Patient had to stop 2 to c/o pain  -[facility] Name 08/03/18 1100          General ROM    LT Lower Ext --   ankle splinted limited 100 %  -SC     GENERAL ROM COMMENTS other jts wfl  -[facility] Name 08/03/18 1100          General Assessment (Manual Muscle Testing)    General Manual Muscle Testing (MMT) Assessment lower extremity strength deficits identified  -SC     Comment, General Manual Muscle Testing (MMT) Assessment other extremities WFL strength  -[facility] Name 08/03/18 1100          Lower Extremity (Manual Muscle Testing)    Comment, MMT: Lower Extremity moving L toes, quads intact 3+/5 -limited by pain.  -[facility] Name 08/03/18 1100          Balance    Balance static sitting balance;static standing balance;dynamic sitting balance;dynamic standing balance  -SC     Row Name 08/03/18 1100          Static Sitting Balance    Level of [place] (Unsupported Sitting, Static Balance) independent  -SC     Sitting Position (Unsupported Sitting, Static Balance) sitting on edge of bed  -SC     Time Able to Maintain Position (Unsupported Sitting, Static Balance) 4 to 5 minutes  -[facility] Name 08/03/18 1100          Static Standing Balance    Level of [place] (Supported Standing, Static Balance) contact guard assist  -SC     Time Able to Maintain Position (Supported Standing, Static Balance) 1 to 2 minutes  -SC     Assistive Device Utilized (Supported Standing, Static Balance) other (see comments)   scooter and bed rail support  -[facility] Name 08/03/18 1100          Dynamic Standing Balance    Level  of [place], Reaches Outside Midline (Standing, Dynamic Balance) contact guard assist   assistive devise  -SC     Row Name 08/03/18 1100          Sensory Assessment/Intervention    Sensory General Assessment no sensation deficits identified  -SC     Row Name 08/03/18 1100          Pain Scale: Numbers Pre/Post-Treatment    Pain Scale: Numbers, Pretreatment 3/10  -SC     Pain Scale: Numbers, Post-Treatment 8/10  -SC     Pain Location - Side Left  -SC     Pain Location ankle  -SC     Pain Intervention(s) Repositioned  -SC     Row Name             Wound 08/02/18 1312 Right ankle incision    Wound - Properties Group Date first assessed: 08/02/18  -TK Time first assessed: 1312  -TK Side: Right  -TK Location: ankle  -TK Type: incision  -TK    Row Name             Wound 08/03/18 0800 Left lower ankle surgical    Wound - Properties Group Date first assessed: 08/03/18  -SM Time first assessed: 0800  -SM Side: Left  -SM Orientation: lower  -SM Location: ankle  -SM Type: surgical  -SM    Row Name 08/03/18 1100          Coping    Observed Emotional State calm;cooperative  -SC     Verbalized Emotional State acceptance  -SC     Row Name 08/03/18 1100          Plan of Care Review    Plan of Care Reviewed With patient  -SC     Row Name 08/03/18 1100          Physical Therapy Clinical Impression    Date of Referral to PT 08/02/18  -SC     PT Diagnosis (PT Clinical Impression) impaired mobility  -SC     Patient/Family Goals Statement (PT Clinical Impression) to to rehab  -SC     Criteria for Skilled Interventions Met (PT Clinical Impression) treatment indicated;yes  -SC     Pathology/Pathophysiology Noted (Describe Specifically for Each System) musculoskeletal  -SC     Impairments Found (describe specific impairments) gait, locomotion, and balance;motor function;ROM  -SC     Care Plan Review (PT) risks/benefits reviewed;care plan/treatment goals reviewed;evaluation/treatment results reviewed  -SC     Care Plan Review, Other  Participant (PT Clinical Impression) daughter  -[facility] Name 08/03/18 1100          Physical Therapy Goals    Bed Mobility Goal Selection (PT) bed mobility, PT goal 1  -SC     Transfer Goal Selection (PT) transfer, PT goal 1;transfer, PT goal 2  -SC     Gait Training Goal Selection (PT) gait training, PT goal 1  -SC     Row Name 08/03/18 1100          Bed Mobility Goal 1 (PT)    Activity/Assistive Device (Bed Mobility Goal 1, PT) bed mobility activities, all  -SC     [place] Level/Cues Needed (Bed Mobility Goal 1, PT) conditional independence  -SC     Time Frame (Bed Mobility Goal 1, PT) long term goal (LTG);10 days  -[facility] Name 08/03/18 1100          Transfer Goal 1 (PT)    Activity/Assistive Device (Transfer Goal 1, PT) sit-to-stand/stand-to-sit;bed-to-chair/chair-to-bed;walker, rolling  -SC     [place] Level/Cues Needed (Transfer Goal 1, PT) supervision required  -SC     Time Frame (Transfer Goal 1, PT) long term goal (LTG);10 days  -[facility] Name 08/03/18 1100          Transfer Goal 2 (PT)    Activity/Assistive Device (Transfer Goal 2, PT) lateral transfer   knee scooter  -SC     [place] Level/Cues Needed (Transfer Goal 2, PT) supervision required  -SC     Time Frame (Transfer Goal 2, PT) long term goal (LTG);10 days  -[facility] Name 08/03/18 1100          Gait Training Goal 1 (PT)    Activity/Assistive Device (Gait Training Goal 1, PT) gait (walking locomotion);walker, rolling   and knee scooter  -SC     [place] Level (Gait Training Goal 1, PT) contact guard assist  -SC     Distance (Gait Goal 1, PT) 50  -SC     Time Frame (Gait Training Goal 1, PT) long term goal (LTG);10 days  -[facility] Name 08/03/18 1100          Positioning and Restraints    Pre-Treatment Position in bed  -SC     Post Treatment Position chair  -SC     In Bed supine;sitting EOB;call light within reach;encouraged to call for assist;exit alarm on  -[facility] Name 08/03/18 0937          Living Environment     Home Accessibility stairs to enter home;other (see comments)  -SC       User Key  (r) = Recorded By, (t) = Taken By, (c) = Cosigned By    Initials Name Provider Type    SC Yeison Demarco, PT Physical Therapist    Chante Benjamin RN Registered Nurse    Siomara Millard RN Registered Nurse          Physical Therapy Education     Title: PT OT SLP Therapies (Done)     Topic: Physical Therapy (Done)     Point: Mobility training (Done)    Learning Progress Summary     Learner Status Readiness Method Response Comment Documented by    Patient Done HIEN Luu,NELSON reviewed safety ith mobility SC 08/03/18 1150          Point: Home exercise program (Done)    Learning Progress Summary     Learner Status Readiness Method Response Comment Documented by    Patient Done HIEN Luu,NELSON reviewed safety ith mobility SC 08/03/18 1150          Point: Body mechanics (Done)    Learning Progress Summary     Learner Status Readiness Method Response Comment Documented by    Patient Done HIEN Luu,NELSON reviewed safety ith mobility SC 08/03/18 1150          Point: Precautions (Done)    Learning Progress Summary     Learner Status Readiness Method Response Comment Documented by    Patient Done HIEN Luu NR reviewed safety ith mobility SC 08/03/18 1150                      User Key     Initials Effective Dates Name Provider Type Discipline    SC 06/19/15 -  Yeison Demarco, PT Physical Therapist PT                PT Recommendation and Plan  Anticipated Discharge Disposition (PT): inpatient rehabilitation facility  Planned Therapy Interventions (PT Eval): balance training, bed mobility training, gait training, home exercise program, patient/family education, transfer training, strengthening  Therapy Frequency (PT Clinical Impression): daily  Outcome Summary/Treatment Plan (PT)  Anticipated Discharge Disposition (PT): inpatient rehabilitation facility  Plan of Care Reviewed With: patient  Progress: improving  Outcome Summary: Patient  able to mobilize out of bed and walk short distance with knee scootter. Recommend continued skilled PT at a Rehab center at d/c          Outcome Measures     Row Name 08/03/18 0937 08/03/18 0912          How much help from another person do you currently need...    Turning from your back to your side while in flat bed without using bedrails? 4  -SC  --     Moving from lying on back to sitting on the side of a flat bed without bedrails? 3  -SC  --     Moving to and from a bed to a chair (including a wheelchair)? 3  -SC  --     Standing up from a chair using your arms (e.g., wheelchair, bedside chair)? 3  -SC  --     Climbing 3-5 steps with a railing? 1  -SC  --     To walk in hospital room? 3  -SC  --     AM-PAC 6 Clicks Score 17  -SC  --        How much help from another is currently needed...    Putting on and taking off regular lower body clothing?  -- 3  -ST     Bathing (including washing, rinsing, and drying)  -- 3  -ST     Toileting (which includes using toilet bed pan or urinal)  -- 3  -ST     Putting on and taking off regular upper body clothing  -- 3  -ST     Taking care of personal grooming (such as brushing teeth)  -- 4  -ST     Eating meals  -- 4  -ST     Score  -- 20  -ST        Functional Assessment    Outcome Measure Options AM-PAC 6 Clicks Basic Mobility (PT)  -SC AM-PAC 6 Clicks Daily Activity (OT)  -ST       User Key  (r) = Recorded By, (t) = Taken By, (c) = Cosigned By    Initials Name Provider Type    Yeison Piper, PT Physical Therapist    Jerica Palmer OTR Occupational Therapist           Time Calculation:         PT Charges     Row Name 08/03/18 1152             Time Calculation    Start Time 0937  -SC      PT Received On 08/03/18  -SC      PT Goal Re-Cert Due Date 08/13/18  -SC        User Key  (r) = Recorded By, (t) = Taken By, (c) = Cosigned By    Initials Name Provider Type    Yeison Piper, PT Physical Therapist        Therapy Suggested Charges     Code   Minutes Charges     None           Therapy Charges for Today     Code Description Service Date Service Provider Modifiers Qty    53024388557 HC PT EVAL LOW COMPLEXITY 4 8/3/2018 Yeison Demarco, PT GP 1    51573810634 HC PT THER SUPP EA 15 MIN 8/3/2018 Yeison Demarco PT GP 2          PT G-Codes  Outcome Measure Options: AM-PAC 6 Clicks Basic Mobility (PT)      Yeison Demarco PT  8/3/2018         Electronically signed by Yeison Demarco PT at 8/3/2018 11:53 AM

## 2018-08-03 NOTE — PLAN OF CARE
Problem: Pain, Acute (Adult)  Goal: Identify Related Risk Factors and Signs and Symptoms  Outcome: Ongoing (interventions implemented as appropriate)   08/03/18 4823   Pain, Acute (Adult)   Related Risk Factors (Acute Pain) disease process;knowledge deficit;patient perception;persistent pain;positioning;surgery, medicated regularly for c/o pain, vss   Signs and Symptoms (Acute Pain) fatigue/weakness;fear of reinjury

## 2018-08-03 NOTE — THERAPY EVALUATION
Acute Care - Physical Therapy Initial Evaluation  [facility]     Patient Name: Maria T Garcia  : 1960  MRN: [id number]  Today's Date: 8/3/2018   Onset of Illness/Injury or Date of Surgery: 18  Date of Referral to PT: 18  Referring Physician: Tori  (tough down wt bearing)      Admit Date: 2018    Visit Dx:     ICD-10-CM ICD-9-CM   1. Closed trimalleolar fracture of left ankle, initial encounter S82.852A 824.6   2. Fall, initial encounter W19.XXXA E888.9   3. Impaired mobility and ADLs Z74.09 799.89   4. Impaired functional mobility, balance, gait, and endurance Z74.09 V49.89     Patient Active Problem List   Diagnosis   • Sustained SVT (CMS/HCC)   • COPD (chronic obstructive pulmonary disease) (CMS/HCC)   • Diabetes mellitus (CMS/HCC)   • Neuropathy   • Tobacco abuse   • CHF (congestive heart failure) (CMS/HCC)   • Class 3 obesity in adult   • Atrial fibrillation with rapid ventricular response (CMS/HCC)   • Pulmonary embolism (CMS/HCC)   • Acute combined systolic and diastolic heart failure (CMS/HCC)   • Chronic combined systolic and diastolic congestive heart failure (CMS/HCC)   • Closed displaced trimalleolar fracture of left ankle   • Leukocytosis   • Polycythemia   • A-fib (CMS/HCC)   • Chronic anticoagulation   • Morbid obesity (CMS/HCC)     Past Medical History:   Diagnosis Date   • CHF (congestive heart failure) (CMS/HCC)    • COPD (chronic obstructive pulmonary disease) (CMS/HCC)    • Diabetes mellitus (CMS/HCC)    • Hypertension    • Neuropathy      Past Surgical History:   Procedure Laterality Date   • BREAST SURGERY     •  SECTION     • DENTAL PROCEDURE          PT ASSESSMENT (last 12 hours)      Physical Therapy Evaluation     Row Name 18 1100 18 0937       PT Evaluation Time/Intention    Subjective Information complains of  -SC complains of;pain  -SC    Document Type  -- evaluation  -SC    Mode of Treatment  -- physical therapy  -SC    Patient Effort  --  good  -SC    Symptoms Noted During/After Treatment  -- none  -SC    Row Name 08/03/18 0937          General Information    Patient Profile Reviewed? yes  -SC     Onset of Illness/Injury or Date of Surgery 07/30/18  -SC     Referring Physician ricardo Valle wt bearing  -SC     Patient Observations alert;cooperative;agree to therapy  -SC     Patient/Family Observations daughter  -SC     General Observations of Patient in bed  -SC     Prior Level of Function independent:;gait;all household mobility   uses walker  -SC     Equipment Currently Used at Home walker, rolling;rollator;grab bar;raised toilet;shower chair  -SC     Pertinent History of Current Functional Problem L ankle fx after fall at home/ s/p Closed reductions  -SC     Existing Precautions/Restrictions oxygen therapy device and L/min;weight bearing;other (see comments)   TDWB L LE  -SC     Risks Reviewed patient:;increased discomfort;change in vital signs;increased drainage  -SC     Benefits Reviewed patient:;improve function;increase independence;increase strength  -SC     Barriers to Rehab previous functional deficit  -SC     Row Name 08/03/18 0937          Relationship/Environment    Primary Source of Support/Comfort child(andreia)  -SC     Lives With child(andreia), adult  -SC     Row Name 08/03/18 0937          Resource/Environmental Concerns    Current Living Arrangements home/apartment/condo  -SC     Row Name 08/03/18 0937          Home Main Entrance    Number of Stairs, Main Entrance three   can do one at a time with walker  -SC     Row Name 08/03/18 0937          Cognitive Assessment/Intervention- PT/OT    Orientation Status (Cognition) oriented x 4  -SC     Follows Commands (Cognition) follows one step commands;over 90% accuracy  -SC     Cognitive Function (Cognitive) safety deficit  -SC     Safety Deficit (Cognitive) mild deficit;insight into deficits/self awareness  -SC     Row Name 08/03/18 0937          Safety Issues, Functional Mobility     Safety Issues Affecting Function (Mobility) judgment  -SC     Impairments Affecting Function (Mobility) balance;pain  -SC     Row Name 08/03/18 0937          Mobility Assessment/Treatment    Extremity Weight-bearing Status left lower extremity  -SC     Left Lower Extremity (Weight-bearing Status) touch down weight-bearing (TDWB)  -SC     Row Name 08/03/18 1100          Bed Mobility Assessment/Treatment    Bed Mobility Assessment/Treatment scooting/bridging  -SC     Scooting/Bridging [place] (Bed Mobility) independent  -SC     Sit-Supine [place] (Bed Mobility) conditional independence  -SC     Bed Mobility, Safety Issues decreased use of legs for bridging/pushing  -SC     Comment (Bed Mobility) to EOB with cueing  -SC     Row Name 08/03/18 1100          Transfer Assessment/Treatment    Transfer Assessment/Treatment sit-stand transfer;stand-sit transfer  -SC     Comment (Transfers) cues for sequencing on/off knee walker -- some contact assist to guide L leg onto knee pad  -SC     Sit-Stand [place] (Transfers) contact guard;verbal cues  -SC     Stand-Sit [place] (Transfers) contact guard;verbal cues  -SC     Row Name 08/03/18 1100          Sit-Stand Transfer    Assistive Device (Sit-Stand Transfers) other (see comments)   knee walker  -SC     Row Name 08/03/18 1100          Stand-Sit Transfer    Assistive Device (Stand-Sit Transfers) --   knee walker  -SC     Row Name 08/03/18 1100          Gait/Stairs Assessment/Training    Gait/Stairs Assessment/Training gait pattern  -SC     [place] Level (Gait) contact guard;verbal cues  -SC     Assistive Device (Gait) walker, knee scooter  -SC     Distance in Feet (Gait) 25   oxygen on patient  -SC     Pattern (Gait) step-through  -SC     Deviations/Abnormal Patterns (Gait) jam decreased  -SC     Comment (Gait/Stairs) cues for using brakes and sequencing waker. Patient had to stop 2 to c/o pain  -SC     Row Name 08/03/18 1100          General ROM    LT  Lower Ext --   ankle splinted limited 100 %  -SC     GENERAL ROM COMMENTS other jts wfl  -SC     Row Name 08/03/18 1100          General Assessment (Manual Muscle Testing)    General Manual Muscle Testing (MMT) Assessment lower extremity strength deficits identified  -SC     Comment, General Manual Muscle Testing (MMT) Assessment other extremities WFL strength  -SC     Row Name 08/03/18 1100          Lower Extremity (Manual Muscle Testing)    Comment, MMT: Lower Extremity moving L toes, quads intact 3+/5 -limited by pain.  -SC     Row Name 08/03/18 1100          Balance    Balance static sitting balance;static standing balance;dynamic sitting balance;dynamic standing balance  -SC     Row Name 08/03/18 1100          Static Sitting Balance    Level of [place] (Unsupported Sitting, Static Balance) independent  -SC     Sitting Position (Unsupported Sitting, Static Balance) sitting on edge of bed  -SC     Time Able to Maintain Position (Unsupported Sitting, Static Balance) 4 to 5 minutes  -SC     Row Name 08/03/18 1100          Static Standing Balance    Level of [place] (Supported Standing, Static Balance) contact guard assist  -SC     Time Able to Maintain Position (Supported Standing, Static Balance) 1 to 2 minutes  -SC     Assistive Device Utilized (Supported Standing, Static Balance) other (see comments)   scooter and bed rail support  -SC     Row Name 08/03/18 1100          Dynamic Standing Balance    Level of [place], Reaches Outside Midline (Standing, Dynamic Balance) contact guard assist   assistive devise  -SC     Row Name 08/03/18 1100          Sensory Assessment/Intervention    Sensory General Assessment no sensation deficits identified  -SC     Row Name 08/03/18 1100          Pain Scale: Numbers Pre/Post-Treatment    Pain Scale: Numbers, Pretreatment 3/10  -SC     Pain Scale: Numbers, Post-Treatment 8/10  -SC     Pain Location - Side Left  -SC     Pain Location ankle  -SC     Pain  Intervention(s) Repositioned  -SC     Row Name             Wound 08/02/18 1312 Right ankle incision    Wound - Properties Group Date first assessed: 08/02/18  -TK Time first assessed: 1312  -TK Side: Right  -TK Location: ankle  -TK Type: incision  -TK    Row Name             Wound 08/03/18 0800 Left lower ankle surgical    Wound - Properties Group Date first assessed: 08/03/18  -SM Time first assessed: 0800  -SM Side: Left  -SM Orientation: lower  -SM Location: ankle  -SM Type: surgical  -SM    Row Name 08/03/18 1100          Coping    Observed Emotional State calm;cooperative  -SC     Verbalized Emotional State acceptance  -SC     Row Name 08/03/18 1100          Plan of Care Review    Plan of Care Reviewed With patient  -SC     Row Name 08/03/18 1100          Physical Therapy Clinical Impression    Date of Referral to PT 08/02/18  -SC     PT Diagnosis (PT Clinical Impression) impaired mobility  -SC     Patient/Family Goals Statement (PT Clinical Impression) to to rehab  -SC     Criteria for Skilled Interventions Met (PT Clinical Impression) treatment indicated;yes  -SC     Pathology/Pathophysiology Noted (Describe Specifically for Each System) musculoskeletal  -SC     Impairments Found (describe specific impairments) gait, locomotion, and balance;motor function;ROM  -SC     Care Plan Review (PT) risks/benefits reviewed;care plan/treatment goals reviewed;evaluation/treatment results reviewed  -SC     Care Plan Review, Other Participant (PT Clinical Impression) daughter  -SC     Row Name 08/03/18 1100          Physical Therapy Goals    Bed Mobility Goal Selection (PT) bed mobility, PT goal 1  -SC     Transfer Goal Selection (PT) transfer, PT goal 1;transfer, PT goal 2  -SC     Gait Training Goal Selection (PT) gait training, PT goal 1  -SC     Row Name 08/03/18 1100          Bed Mobility Goal 1 (PT)    Activity/Assistive Device (Bed Mobility Goal 1, PT) bed mobility activities, all  -SC     [place] Level/Cues  Needed (Bed Mobility Goal 1, PT) conditional independence  -SC     Time Frame (Bed Mobility Goal 1, PT) long term goal (LTG);10 days  -SC     Row Name 08/03/18 1100          Transfer Goal 1 (PT)    Activity/Assistive Device (Transfer Goal 1, PT) sit-to-stand/stand-to-sit;bed-to-chair/chair-to-bed;walker, rolling  -SC     [place] Level/Cues Needed (Transfer Goal 1, PT) supervision required  -SC     Time Frame (Transfer Goal 1, PT) long term goal (LTG);10 days  -SC     Row Name 08/03/18 1100          Transfer Goal 2 (PT)    Activity/Assistive Device (Transfer Goal 2, PT) lateral transfer   knee scooter  -SC     [place] Level/Cues Needed (Transfer Goal 2, PT) supervision required  -SC     Time Frame (Transfer Goal 2, PT) long term goal (LTG);10 days  -SC     Row Name 08/03/18 1100          Gait Training Goal 1 (PT)    Activity/Assistive Device (Gait Training Goal 1, PT) gait (walking locomotion);walker, rolling   and knee scooter  -SC     [place] Level (Gait Training Goal 1, PT) contact guard assist  -SC     Distance (Gait Goal 1, PT) 50  -SC     Time Frame (Gait Training Goal 1, PT) long term goal (LTG);10 days  -SC     Row Name 08/03/18 1100          Positioning and Restraints    Pre-Treatment Position in bed  -SC     Post Treatment Position chair  -SC     In Bed supine;sitting EOB;call light within reach;encouraged to call for assist;exit alarm on  -SC     Row Name 08/03/18 0937          Living Environment    Home Accessibility stairs to enter home;other (see comments)  -SC       User Key  (r) = Recorded By, (t) = Taken By, (c) = Cosigned By    Initials Name Provider Type    SC Yeison Demarco, PT Physical Therapist    Chante Benjamin RN Registered Nurse    Siomara Millard RN Registered Nurse          Physical Therapy Education     Title: PT OT SLP Therapies (Done)     Topic: Physical Therapy (Done)     Point: Mobility training (Done)    Learning Progress Summary     Learner Status Readiness  Method Response Comment Documented by    Patient Done HIEN Luu NR reviewed safety ith mobility SC 08/03/18 1150          Point: Home exercise program (Done)    Learning Progress Summary     Learner Status Readiness Method Response Comment Documented by    Patient Done HIEN Luu NR reviewed safety ith mobility SC 08/03/18 1150          Point: Body mechanics (Done)    Learning Progress Summary     Learner Status Readiness Method Response Comment Documented by    Patient Done HIEN Luu NR reviewed safety ith mobility SC 08/03/18 1150          Point: Precautions (Done)    Learning Progress Summary     Learner Status Readiness Method Response Comment Documented by    Patient Done HIEN Luu NR reviewed safety ith mobility SC 08/03/18 1150                      User Key     Initials Effective Dates Name Provider Type Discipline    SC 06/19/15 -  Yeison Demarco, PT Physical Therapist PT                PT Recommendation and Plan  Anticipated Discharge Disposition (PT): inpatient rehabilitation facility  Planned Therapy Interventions (PT Eval): balance training, bed mobility training, gait training, home exercise program, patient/family education, transfer training, strengthening  Therapy Frequency (PT Clinical Impression): daily  Outcome Summary/Treatment Plan (PT)  Anticipated Discharge Disposition (PT): inpatient rehabilitation facility  Plan of Care Reviewed With: patient  Progress: improving  Outcome Summary: Patient able to mobilize out of bed and walk short distance with knee scootter. Recommend continued skilled PT at a Rehab center at d/c          Outcome Measures     Row Name 08/03/18 0937 08/03/18 0912          How much help from another person do you currently need...    Turning from your back to your side while in flat bed without using bedrails? 4  -SC  --     Moving from lying on back to sitting on the side of a flat bed without bedrails? 3  -SC  --     Moving to and from a bed to a chair  (including a wheelchair)? 3  -SC  --     Standing up from a chair using your arms (e.g., wheelchair, bedside chair)? 3  -SC  --     Climbing 3-5 steps with a railing? 1  -SC  --     To walk in hospital room? 3  -SC  --     AM-PAC 6 Clicks Score 17  -SC  --        How much help from another is currently needed...    Putting on and taking off regular lower body clothing?  -- 3  -ST     Bathing (including washing, rinsing, and drying)  -- 3  -ST     Toileting (which includes using toilet bed pan or urinal)  -- 3  -ST     Putting on and taking off regular upper body clothing  -- 3  -ST     Taking care of personal grooming (such as brushing teeth)  -- 4  -ST     Eating meals  -- 4  -ST     Score  -- 20  -ST        Functional Assessment    Outcome Measure Options AM-PAC 6 Clicks Basic Mobility (PT)  -SC AM-PAC 6 Clicks Daily Activity (OT)  -       User Key  (r) = Recorded By, (t) = Taken By, (c) = Cosigned By    Initials Name Provider Type    SC Yeison Demarco PT Physical Therapist    Jerica Palmer OTR Occupational Therapist           Time Calculation:         PT Charges     Row Name 08/03/18 1152             Time Calculation    Start Time 0937  -SC      PT Received On 08/03/18  -SC      PT Goal Re-Cert Due Date 08/13/18  -SC        User Key  (r) = Recorded By, (t) = Taken By, (c) = Cosigned By    Initials Name Provider Type    SC Yeison Demarco PT Physical Therapist        Therapy Suggested Charges     Code   Minutes Charges    None           Therapy Charges for Today     Code Description Service Date Service Provider Modifiers Qty    [id number]  PT EVAL LOW COMPLEXITY 4 8/3/2018 Yeison eDmarco, PT GP 1    [id number]  PT THER SUPP EA 15 MIN 8/3/2018 Yeison Demarco, PT GP 2          PT G-Codes  Outcome Measure Options: AM-PAC 6 Clicks Basic Mobility (PT)      Yeison Demarco PT  8/3/2018

## 2018-08-03 NOTE — PROGRESS NOTES
Continued Stay Note  [facility]     Patient Name: Maria T Garcia  MRN: [id number]  Today's Date: 8/3/2018    Admit Date: 7/30/2018          Discharge Plan     Row Name 08/03/18 1435       Plan    Plan Home w/ HH    Patient/Family in Agreement with Plan yes    Plan Comments Spoke with patient and daughter at bedside. Per therapy recs patient would benefit from short term rehab. Patient and dtr are not agreeable and prefer home w/ HH. Delores states she will be able to provide 24/7 assist. D/w PT and they recommend if patient returns home she will need a WC or transport chair. Patient and dtr are agreeable and would also like a BSC. Referral called to Nicky, she will deliver a BSC to bedside and will explain WC and transport chair options. Whichever patient prefers Nicky will deliver to bedside as well. Referral called to Suki at [facility], [phone], for Eduin Yi Referral faxed to [phone] at her request. No other needs noted at this time. Plan is home w/ HH/PT/OT and assist from family. CM following.       ADDENDUM: Patient refused both a WC and a transport chair stating neither would fit in their vehicle. DME rep explained that it would fold up and was important equipment for her to have as she is returning home and has steps to enter her house. Patient continued to decline the DME. BSC has been delivered to her at bedside.     Final Discharge Disposition Code 06 - home with home health care              Discharge Codes    No documentation.       Expected Discharge Date and Time     Expected Discharge Date Expected Discharge Time    Aug 3, 2018             Katie Kearns RN

## 2018-08-03 NOTE — PLAN OF CARE
Problem: Patient Care Overview  Goal: Plan of Care Review  Outcome: Ongoing (interventions implemented as appropriate)   08/03/18 4560   OTHER   Outcome Summary Pt presents s/p fall with resulting open reduction tx. Pt min A/CGA with rwx for bed mobility, standing and transfer to [facility]->bed. Pt able to maintain WB'ing status s/p education. Also able to complete toileting/hygiene. Pt desires d/c home with 24/7 care. D/C plan should be appropriate    Coping/Psychosocial   Plan of Care Reviewed With patient

## 2018-08-04 VITALS
HEART RATE: 94 BPM | OXYGEN SATURATION: 93 % | TEMPERATURE: 98.4 F | SYSTOLIC BLOOD PRESSURE: 94 MMHG | RESPIRATION RATE: 17 BRPM | WEIGHT: 245 LBS | HEIGHT: 62 IN | BODY MASS INDEX: 45.08 KG/M2 | DIASTOLIC BLOOD PRESSURE: 60 MMHG

## 2018-08-04 LAB
ANION GAP SERPL CALCULATED.3IONS-SCNC: 4 MMOL/L (ref 3–11)
BUN BLD-MCNC: 13 MG/DL (ref 9–23)
BUN/CREAT SERPL: 13.5 (ref 7–25)
CALCIUM SPEC-SCNC: 9.1 MG/DL (ref 8.7–10.4)
CHLORIDE SERPL-SCNC: 94 MMOL/L (ref 99–109)
CO2 SERPL-SCNC: 39 MMOL/L (ref 20–31)
CREAT BLD-MCNC: 0.96 MG/DL (ref 0.6–1.3)
DEPRECATED RDW RBC AUTO: 46.6 FL (ref 37–54)
ERYTHROCYTE [DISTWIDTH] IN BLOOD BY AUTOMATED COUNT: 13.1 % (ref 11.3–14.5)
GFR SERPL CREATININE-BSD FRML MDRD: 60 ML/MIN/1.73
GLUCOSE BLD-MCNC: 110 MG/DL (ref 70–100)
GLUCOSE BLDC GLUCOMTR-MCNC: 117 MG/DL (ref 70–130)
GLUCOSE BLDC GLUCOMTR-MCNC: 159 MG/DL (ref 70–130)
HCT VFR BLD AUTO: 44.1 % (ref 34.5–44)
HGB BLD-MCNC: 13.9 G/DL (ref 11.5–15.5)
MAGNESIUM SERPL-MCNC: 2.1 MG/DL (ref 1.3–2.7)
MCH RBC QN AUTO: 30.7 PG (ref 27–31)
MCHC RBC AUTO-ENTMCNC: 31.5 G/DL (ref 32–36)
MCV RBC AUTO: 97.4 FL (ref 80–99)
PLATELET # BLD AUTO: 260 10*3/MM3 (ref 150–450)
PMV BLD AUTO: 10.9 FL (ref 6–12)
POTASSIUM BLD-SCNC: 3.2 MMOL/L (ref 3.5–5.5)
RBC # BLD AUTO: 4.53 10*6/MM3 (ref 3.89–5.14)
SODIUM BLD-SCNC: 137 MMOL/L (ref 132–146)
WBC NRBC COR # BLD: 10.12 10*3/MM3 (ref 3.5–10.8)

## 2018-08-04 PROCEDURE — 25010000002 ENOXAPARIN PER 10 MG: Performed by: FAMILY MEDICINE

## 2018-08-04 PROCEDURE — 99239 HOSP IP/OBS DSCHRG MGMT >30: CPT | Performed by: NURSE PRACTITIONER

## 2018-08-04 PROCEDURE — 97530 THERAPEUTIC ACTIVITIES: CPT

## 2018-08-04 PROCEDURE — 94799 UNLISTED PULMONARY SVC/PX: CPT

## 2018-08-04 PROCEDURE — 80048 BASIC METABOLIC PNL TOTAL CA: CPT | Performed by: INTERNAL MEDICINE

## 2018-08-04 PROCEDURE — 82962 GLUCOSE BLOOD TEST: CPT

## 2018-08-04 PROCEDURE — 97110 THERAPEUTIC EXERCISES: CPT

## 2018-08-04 PROCEDURE — 83735 ASSAY OF MAGNESIUM: CPT | Performed by: NURSE PRACTITIONER

## 2018-08-04 PROCEDURE — 85027 COMPLETE CBC AUTOMATED: CPT | Performed by: INTERNAL MEDICINE

## 2018-08-04 RX ORDER — OXYCODONE HYDROCHLORIDE AND ACETAMINOPHEN 5; 325 MG/1; MG/1
1 TABLET ORAL EVERY 6 HOURS PRN
Qty: 20 TABLET | Refills: 0 | Status: SHIPPED | OUTPATIENT
Start: 2018-08-04 | End: 2018-11-19

## 2018-08-04 RX ORDER — MAGNESIUM SULFATE HEPTAHYDRATE 40 MG/ML
2 INJECTION, SOLUTION INTRAVENOUS AS NEEDED
Status: DISCONTINUED | OUTPATIENT
Start: 2018-08-04 | End: 2018-08-04 | Stop reason: HOSPADM

## 2018-08-04 RX ORDER — POTASSIUM CHLORIDE 750 MG/1
40 CAPSULE, EXTENDED RELEASE ORAL AS NEEDED
Status: DISCONTINUED | OUTPATIENT
Start: 2018-08-04 | End: 2018-08-04 | Stop reason: HOSPADM

## 2018-08-04 RX ORDER — POTASSIUM CHLORIDE 1.5 G/1.77G
40 POWDER, FOR SOLUTION ORAL AS NEEDED
Status: DISCONTINUED | OUTPATIENT
Start: 2018-08-04 | End: 2018-08-04 | Stop reason: HOSPADM

## 2018-08-04 RX ORDER — MAGNESIUM SULFATE HEPTAHYDRATE 40 MG/ML
4 INJECTION, SOLUTION INTRAVENOUS AS NEEDED
Status: DISCONTINUED | OUTPATIENT
Start: 2018-08-04 | End: 2018-08-04 | Stop reason: HOSPADM

## 2018-08-04 RX ADMIN — CETIRIZINE HYDROCHLORIDE 10 MG: 10 TABLET, FILM COATED ORAL at 09:13

## 2018-08-04 RX ADMIN — ASPIRIN 81 MG: 81 TABLET, COATED ORAL at 09:01

## 2018-08-04 RX ADMIN — TORSEMIDE 20 MG: 20 TABLET ORAL at 07:49

## 2018-08-04 RX ADMIN — POTASSIUM CHLORIDE 40 MEQ: 750 CAPSULE, EXTENDED RELEASE ORAL at 09:14

## 2018-08-04 RX ADMIN — CITALOPRAM HYDROBROMIDE 40 MG: 20 TABLET ORAL at 09:00

## 2018-08-04 RX ADMIN — OXYCODONE HYDROCHLORIDE AND ACETAMINOPHEN 1 TABLET: 5; 325 TABLET ORAL at 14:27

## 2018-08-04 RX ADMIN — HYDROXYZINE HYDROCHLORIDE 50 MG: 25 TABLET, FILM COATED ORAL at 09:00

## 2018-08-04 RX ADMIN — OXYCODONE HYDROCHLORIDE AND ACETAMINOPHEN 1 TABLET: 5; 325 TABLET ORAL at 07:47

## 2018-08-04 RX ADMIN — METOPROLOL TARTRATE 12.5 MG: 25 TABLET, FILM COATED ORAL at 09:00

## 2018-08-04 RX ADMIN — PANTOPRAZOLE SODIUM 40 MG: 40 TABLET, DELAYED RELEASE ORAL at 07:48

## 2018-08-04 RX ADMIN — ENOXAPARIN SODIUM 110 MG: 120 INJECTION SUBCUTANEOUS at 00:18

## 2018-08-04 RX ADMIN — DOFETILIDE 250 MCG: 0.25 CAPSULE ORAL at 09:00

## 2018-08-04 RX ADMIN — GABAPENTIN 100 MG: 100 CAPSULE ORAL at 09:14

## 2018-08-04 NOTE — PROGRESS NOTES
Case Management Discharge Note    Final Note: Spoke with Monserrat/Matthew.  Notified her that patient will be discharge home today.  They will contact/see patient on Monday.  Discharge summary faxed to [phone] per her request.      Destination     No service has been selected for the patient.      Durable Medical Equipment - Selection Complete     Service Request Status Selected Specialties Address Phone Number Fax Number    [facility] Selected DME Services [address] [facility] [address] [facility] [address] [phone]      Dialysis/Infusion     No service has been selected for the patient.      Home Medical Care - Selection Complete     Service Request Status Selected Specialties Address Phone Number Fax Number    [facility] Selected Home Health Services [address] [facility] [address] [facility] [address] [phone]        Katie Kearns RN 8/3/2018 1446    Matthew  in [facility] will be providing care, they are not listed.                  Social Care     No service has been selected for the patient.             Final Discharge Disposition Code: 06 - home with home health care

## 2018-08-04 NOTE — THERAPY TREATMENT NOTE
Acute Care - Physical Therapy Treatment Note  [facility]     Patient Name: Maria T Garcia  : 1960  MRN: [id number]  Today's Date: 2018  Onset of Illness/Injury or Date of Surgery: 18  Date of Referral to PT: 18  Referring Physician: Tori  (tough down wt bearing)    Admit Date: 2018    Visit Dx:    ICD-10-CM ICD-9-CM   1. Closed trimalleolar fracture of left ankle, initial encounter S82.852A 824.6   2. Fall, initial encounter W19.XXXA E888.9   3. Impaired mobility and ADLs Z74.09 799.89   4. Impaired functional mobility, balance, gait, and endurance Z74.09 V49.89     Patient Active Problem List   Diagnosis   • Sustained SVT (CMS/HCC)   • COPD (chronic obstructive pulmonary disease) (CMS/HCC)   • Diabetes mellitus (CMS/HCC)   • Neuropathy   • Tobacco abuse   • CHF (congestive heart failure) (CMS/HCC)   • Class 3 obesity in adult   • Atrial fibrillation with rapid ventricular response (CMS/HCC)   • Pulmonary embolism (CMS/HCC)   • Acute combined systolic and diastolic heart failure (CMS/HCC)   • Chronic combined systolic and diastolic congestive heart failure (CMS/HCC)   • Closed displaced trimalleolar fracture of left ankle   • Leukocytosis   • Polycythemia   • A-fib (CMS/HCC)   • Chronic anticoagulation   • Morbid obesity (CMS/HCC)       Therapy Treatment          Rehabilitation Treatment Summary     Row Name 18 0824             Treatment Time/Intention    Discipline physical therapist  -AA      Document Type therapy note (daily note)  -AA      Subjective Information complains of;pain  -AA      Mode of Treatment individual therapy;physical therapy  -AA      Patient/Family Observations daughter present, sleeping during treatment  -AA      Care Plan Review care plan/treatment goals reviewed;risks/benefits reviewed;current/potential barriers reviewed;patient/other agree to care plan  -AA      Patient Effort adequate  -AA      Existing Precautions/Restrictions oxygen therapy device  and L/min;weight bearing;other (see comments)   TDWB LLE  -AA      Recorded by [AA] Jennifer Teran, PT 08/04/18 1038      Row Name 08/04/18 0824             Vital Signs    Pre SpO2 (%) 95  -AA      O2 Delivery Pre Treatment supplemental O2  -AA      Intra SpO2 (%) 93  -AA      O2 Delivery Intra Treatment supplemental O2  -AA      Post SpO2 (%) 95  -AA      O2 Delivery Post Treatment supplemental O2  -AA      Pre Patient Position Supine  -AA      Intra Patient Position Sitting  -AA      Post Patient Position Supine  -AA      Recorded by [AA] Jennifer Teran, PT 08/04/18 1038      Row Name 08/04/18 0824             Cognitive Assessment/Intervention    Additional Documentation Cognitive Assessment/Intervention (Group)  -AA      Recorded by [AA] Jennifer Teran, PT 08/04/18 1038      Row Name 08/04/18 0824             Cognitive Assessment/Intervention- PT/OT    Affect/Mental Status (Cognitive) WNL  -AA      Orientation Status (Cognition) oriented x 4  -AA      Follows Commands (Cognition) WFL  -AA      Cognitive Function (Cognitive) safety deficit  -AA      Safety Deficit (Cognitive) mild deficit;at risk behavior observed;awareness of need for assistance;insight into deficits/self awareness;safety precautions awareness;safety precautions follow-through/compliance  -AA      Personal Safety Interventions fall prevention program maintained;gait belt;nonskid shoes/slippers when out of bed  -AA      Recorded by [AA] Jennifer Teran, PT 08/04/18 1038      Row Name 08/04/18 0824             Safety Issues, Functional Mobility    Safety Issues Affecting Function (Mobility) awareness of need for assistance;insight into deficits/self awareness;safety precaution awareness;safety precautions follow-through/compliance;sequencing abilities  -AA      Impairments Affecting Function (Mobility) balance;strength  -AA      Recorded by [AA] Jennifer Teran, PT 08/04/18 1038      Row Name 08/04/18 0824             Mobility Assessment/Intervention     Extremity Weight-bearing Status left lower extremity  -AA      Left Lower Extremity (Weight-bearing Status) touch down weight-bearing (TDWB)  -AA      Recorded by [AA] Jennifer Teran, PT 08/04/18 1038      Row Name 08/04/18 0824             Bed Mobility Assessment/Treatment    Bed Mobility Assessment/Treatment scooting/bridging;supine-sit-supine  -AA      Scooting/Bridging [place] (Bed Mobility) independent  -AA      Sit-Supine [place] (Bed Mobility) independent  -AA      Supine-Sit-Supine [place] (Bed Mobility) conditional independence  -AA      Bed Mobility, Safety Issues decreased use of legs for bridging/pushing  -AA      Assistive Device (Bed Mobility) bed rails  -AA      Recorded by [AA] Jennifer Teran, PT 08/04/18 1038      Row Name 08/04/18 0824             Transfer Assessment/Treatment    Transfer Assessment/Treatment sit-stand transfer;stand-sit transfer;stand pivot/stand step transfer  -AA      Maintains Weight-bearing Status (Transfers) able to maintain;verbal cues to maintain  -AA      Recorded by [AA] Jennifer Teran, PT 08/04/18 1038      Row Name 08/04/18 0824             Sit-Stand Transfer    Sit-Stand [place] (Transfers) contact guard;verbal cues  -AA      Assistive Device (Sit-Stand Transfers) walker, 4-wheeled  -AA      Recorded by [AA] Jennifer Teran, PT 08/04/18 1038      Row Name 08/04/18 0824             Stand-Sit Transfer    Stand-Sit [place] (Transfers) verbal cues;contact guard  -AA      Assistive Device (Stand-Sit Transfers) walker, 4-wheeled  -AA      Recorded by [AA] Jennifer Teran, PT 08/04/18 1038      Row Name 08/04/18 0824             Stand Pivot/Stand Step Transfer    Stand Pivot/Stand Step [place] contact guard;verbal cues  -AA      Assistive Device (Stand Pivot Stand Step Transfer) walker, 4-wheeled  -AA      Recorded by [AA] Jennifer Teran, PT 08/04/18 1038      Row Name 08/04/18 0824             Gait/Stairs Assessment/Training    Comment  (Gait/Stairs) pt refuse to ambulate with knee walker, rollator, RW, or perform stairs  -AA      Recorded by [AA] Jennifer Teran, PT 08/04/18 1038      Row Name 08/04/18 0824             Motor Skills Assessment/Interventions    Additional Documentation Therapeutic Exercise (Group);Therapeutic Exercise Interventions (Group)  -AA      Recorded by [AA] Jennifer Teran, PT 08/04/18 1043      Row Name 08/04/18 0824             Therapeutic Exercise    16811 - PT Therapeutic Exercise Minutes 14  -AA      Recorded by [AA] Jennifer Teran, PT 08/04/18 1043      Row Name 08/04/18 0824             Therapeutic Exercise    Lower Extremity (Therapeutic Exercise) gluteal sets;LAQ (long arc quad), bilateral;marching while seated;quad sets, bilateral  -AA      Lower Extremity Range of Motion (Therapeutic Exercise) hip flexion/extension, bilateral;hip abduction/adduction, bilateral;knee flexion/extension, bilateral;ankle dorsiflexion/plantar flexion, bilateral  -AA      Exercise Type (Therapeutic Exercise) AROM (active range of motion)  -AA      Position (Therapeutic Exercise) seated;supine  -AA      Sets/Reps (Therapeutic Exercise) 10  -AA      Recorded by [AA] Jennifer Teran, PT 08/04/18 1043      Row Name 08/04/18 0824             Balance    Balance dynamic sitting balance;static standing balance;dynamic standing balance  -AA      Recorded by [AA] Jennifer Teran, PT 08/04/18 1038      Row Name 08/04/18 0824             Static Sitting Balance    Level of [place] (Unsupported Sitting, Static Balance) independent  -AA      Sitting Position (Unsupported Sitting, Static Balance) sitting on edge of bed  -AA      Recorded by [AA] Jennifer Teran, PT 08/04/18 1038      Row Name 08/04/18 0824             Dynamic Sitting Balance    Level of [place], Reaches Outside Midline (Sitting, Dynamic Balance) independent  -AA      Sitting Position, Reaches Outside Midline (Sitting, Dynamic Balance) sitting on edge of bed  -AA      Recorded by  [AA] Jennifer Teran, PT 08/04/18 1038      Row Name 08/04/18 0824             Static Standing Balance    Level of [place] (Supported Standing, Static Balance) contact guard assist  -AA      Time Able to Maintain Position (Supported Standing, Static Balance) 1 to 2 minutes  -AA      Assistive Device Utilized (Supported Standing, Static Balance) rolling walker  -AA      Recorded by [AA] Jennifer Teran, PT 08/04/18 1038      Row Name 08/04/18 0824             Dynamic Standing Balance    Level of [place], Reaches Outside Midline (Standing, Dynamic Balance) contact guard assist;minimal assist, 75% patient effort  -AA      Time Able to Maintain Position, Reaches Outside Midline (Standing, Dynamic Balance) 1 to 2 minutes  -AA      Recorded by [AA] Jennifer Teran, PT 08/04/18 1038      Row Name 08/04/18 0824             Positioning and Restraints    Pre-Treatment Position in bed  -AA      Post Treatment Position bed  -AA      In Bed notified nsg;side lying left;call light within reach;encouraged to call for assist;with family/caregiver  -AA      Recorded by [AA] Jennifer Teran, PT 08/04/18 1038      Row Name 08/04/18 0824             Pain Assessment    Additional Documentation Pain Scale: Numbers Pre/Post-Treatment (Group)  -AA      Recorded by [AA] Jennifer Teran, PT 08/04/18 1038      Row Name 08/04/18 0824             Pain Scale: Numbers Pre/Post-Treatment    Pain Scale: Numbers, Pretreatment 4/10  -AA      Pain Scale: Numbers, Post-Treatment 6/10  -AA      Pain Location - Side Left  -AA      Pain Location - Orientation lower  -AA      Pain Location ankle  -AA      Pain Intervention(s) Repositioned  -AA      Recorded by [AA] Jennifer Teran, PT 08/04/18 1038      Row Name 08/04/18 0824             Sensory Assessment/Intervention    Sensory General Assessment no sensation deficits identified  -AA      Recorded by [AA] Jennifer Teran, PT 08/04/18 1038      Row Name                Wound 08/02/18 1312 Right ankle  incision    Wound - Properties Group Date first assessed: 08/02/18 [TK] Time first assessed: 1312 [TK] Side: Right [TK] Location: ankle [TK] Type: incision [TK] Recorded by:  [TK] Chante Ozuna RN 08/02/18 1312    Row Name                Wound 08/03/18 0800 Left lower ankle surgical    Wound - Properties Group Date first assessed: 08/03/18 [SM] Time first assessed: 0800 [SM] Side: Left [SM] Orientation: lower [SM] Location: ankle [SM] Type: surgical [SM] Recorded by:  [LISS] Siomara Wolf RN 08/03/18 1131    Row Name 08/04/18 0824             Coping    Observed Emotional State agitated;uncooperative  -AA      Verbalized Emotional State frustration  -AA      Recorded by [AA] Jennifer Teran, PT 08/04/18 1038      Row Name 08/04/18 0824             Plan of Care Review    Plan of Care Reviewed With patient  -AA      Recorded by [NERISSA] Jennifer Teran, PT 08/04/18 1038      Row Name 08/04/18 0824             Outcome Summary/Treatment Plan (PT)    Daily Summary of Progress (PT) progress toward functional goals is gradual  -AA      Plan for Continued Treatment (PT) pt plans to DC home today  -AA      Anticipated Discharge Disposition (PT) inpatient rehabilitation facility;other (see comments)   pt refuse rehab, will be utilizing  services  -AA      Recorded by [NERISSA] Jennifer Teran, PT 08/04/18 1038        User Key  (r) = Recorded By, (t) = Taken By, (c) = Cosigned By    Initials Name Effective Dates Discipline    TK Chante Ozuna RN 06/16/16 -  Nurse    Siomara Millard RN 06/16/16 -  Nurse    Jennifer Simms, PT 04/02/18 -  PT          Wound 08/02/18 1312 Right ankle incision (Active)   Dressing Appearance dry;intact;no drainage 8/4/2018  6:00 AM   Care, Wound other (see comments) 8/3/2018  8:30 PM       Wound 08/03/18 0800 Left lower ankle surgical (Active)   Dressing Appearance dry;intact 8/4/2018  7:45 AM             Physical Therapy Education     Title: PT OT SLP Therapies (Active)     Topic: Physical Therapy  (Active)     Point: Mobility training (Active)    Learning Progress Summary     Learner Status Readiness Method Response Comment Documented by    Patient Active Eager E NR   08/04/18 1040     Done HIEN Luu,NR reviewed safety ith mobility SC 08/03/18 1150          Point: Home exercise program (Active)    Learning Progress Summary     Learner Status Readiness Method Response Comment Documented by    Patient Active Eager E NR   08/04/18 1040     Done EagHIEN Valenzuela,NR reviewed safety ith mobility SC 08/03/18 1150          Point: Body mechanics (Active)    Learning Progress Summary     Learner Status Readiness Method Response Comment Documented by    Patient Active Eager E NR   08/04/18 1040     Done Eager E LOKI,HIEN,NR reviewed safety ith mobility SC 08/03/18 1150          Point: Precautions (Active)    Learning Progress Summary     Learner Status Readiness Method Response Comment Documented by    Patient Active Pedroer E NR   08/04/18 1040     Done HIEN Luu,NR reviewed safety ith mobility SC 08/03/18 1150                      User Key     Initials Effective Dates Name Provider Type Discipline    SC 06/19/15 -  Yeison Demarco, PT Physical Therapist PT     04/02/18 -  Jennifer Teran, PT Physical Therapist PT                    PT Recommendation and Plan  Anticipated Discharge Disposition (PT): inpatient rehabilitation facility, other (see comments) (pt refuse rehab, will be utilizing  services)  Outcome Summary/Treatment Plan (PT)  Daily Summary of Progress (PT): progress toward functional goals is gradual  Plan for Continued Treatment (PT): pt plans to DC home today  Anticipated Discharge Disposition (PT): inpatient rehabilitation facility, other (see comments) (pt refuse rehab, will be utilizing  services)  Plan of Care Reviewed With: patient  Progress: no change  Outcome Summary: pt refuse to ambulate with knee walker or RW this date.  Attempted rollator per pt request with pt unable.  PT  encouraged pt to ambulate with knee walker with continued refusal.  SPT bed to from rollator CGA with TDWB.  Pt refuse to  ambulate up and down stairs.  Pt states he family will lift her in a chair over the steps and that she will sit on the rollator and move around her home.  Pt plans to DC home this date with  services.          Outcome Measures     Row Name 08/04/18 0824 08/03/18 0937 08/03/18 0912       How much help from another person do you currently need...    Turning from your back to your side while in flat bed without using bedrails? 4  -AA 4  -SC  --    Moving from lying on back to sitting on the side of a flat bed without bedrails? 4  -AA 3  -SC  --    Moving to and from a bed to a chair (including a wheelchair)? 3  -AA 3  -SC  --    Standing up from a chair using your arms (e.g., wheelchair, bedside chair)? 3  -AA 3  -SC  --    Climbing 3-5 steps with a railing? 1  -AA 1  -SC  --    To walk in hospital room? 2  -AA 3  -SC  --    AM-PAC 6 Clicks Score 17  -AA 17  -SC  --       How much help from another is currently needed...    Putting on and taking off regular lower body clothing?  --  -- 3  -ST    Bathing (including washing, rinsing, and drying)  --  -- 3  -ST    Toileting (which includes using toilet bed pan or urinal)  --  -- 3  -ST    Putting on and taking off regular upper body clothing  --  -- 3  -ST    Taking care of personal grooming (such as brushing teeth)  --  -- 4  -ST    Eating meals  --  -- 4  -ST    Score  --  -- 20  -ST       Functional Assessment    Outcome Measure Options AM-PAC 6 Clicks Basic Mobility (PT)  -AA AM-PAC 6 Clicks Basic Mobility (PT)  -SC AM-PAC 6 Clicks Daily Activity (OT)  -ST      User Key  (r) = Recorded By, (t) = Taken By, (c) = Cosigned By    Initials Name Provider Type    Yeison Piper, PT Physical Therapist    ST Jerica Win, OTR Occupational Therapist    Jennifer Simms, PT Physical Therapist           Time Calculation:         PT Charges     David  Name 08/04/18 0824             Time Calculation    Start Time 0824  -AA      PT Received On 08/04/18  -      PT Goal Re-Cert Due Date 08/04/18  -         Time Calculation- PT    Total Timed Code Minutes- PT 32 minute(s)  -AA         Timed Charges    68252 - PT Therapeutic Exercise Minutes 14  -AA      27056 - PT Therapeutic Activity Minutes 18  -AA        User Key  (r) = Recorded By, (t) = Taken By, (c) = Cosigned By    Initials Name Provider Type    AA Jennifer Teran, PT Physical Therapist        Therapy Suggested Charges     Code   Minutes Charges    16874 (CPT®) Hc Pt Neuromusc Re Education Ea 15 Min      55166 (CPT®) Hc Pt Ther Proc Ea 15 Min 14 1    68728 (CPT®) Hc Gait Training Ea 15 Min      08604 (CPT®) Hc Pt Therapeutic Act Ea 15 Min 18 1    88912 (CPT®) Hc Pt Manual Therapy Ea 15 Min      35292 (CPT®) Hc Pt Iontophoresis Ea 15 Min      69286 (CPT®) Hc Pt Elec Stim Ea-Per 15 Min      08455 (CPT®) Hc Pt Ultrasound Ea 15 Min      93915 (CPT®) Hc Pt Self Care/Mgmt/Train Ea 15 Min      Total  32 2        Therapy Charges for Today     Code Description Service Date Service Provider Modifiers Qty    35471694825 HC PT THER PROC EA 15 MIN 8/4/2018 Jennifer Teran, PT GP 1    [id number] HC PT THERAPEUTIC ACT EA 15 MIN 8/4/2018 Jennifer Teran, PT GP 1          PT G-Codes  Outcome Measure Options: AM-PAC 6 Clicks Basic Mobility (PT)    Jennifer Teran PT  8/4/2018

## 2018-08-04 NOTE — PLAN OF CARE
Problem: Patient Care Overview  Goal: Plan of Care Review  Outcome: Ongoing (interventions implemented as appropriate)   08/04/18 3491   Plan of Care Review   Progress improving   OTHER   Outcome Summary Patient had mild complaints of pain, she will be discharged today. will continue to monitor family at bedside    Coping/Psychosocial   Plan of Care Reviewed With patient

## 2018-08-04 NOTE — PLAN OF CARE
Problem: Patient Care Overview  Goal: Plan of Care Review  Outcome: Ongoing (interventions implemented as appropriate)   08/04/18 0824   Plan of Care Review   Progress no change   OTHER   Outcome Summary pt refuse to ambulate with knee walker or RW this date. Attempted rollator per pt request with pt unable. PT encouraged pt to ambulate with knee walker with continued refusal. SPT bed to from rollator CGA with TDWB. Pt refuse to ambulate up and down stairs. Pt states he family will lift her in a chair over the steps and that she will sit on the rollator and move around her home. Pt plans to DC home this date with  services.   Coping/Psychosocial   Plan of Care Reviewed With patient

## 2018-08-04 NOTE — DISCHARGE PLACEMENT REQUEST
"Discharge Summary    Maria T Garcia  (58 y.o. Female)     Date of Birth Social Security Number Address Home Phone MRN    1960  [address] [place] [address] [phone] [id number]    [employer]ab[employer] Marital Status          [place] Single       Admission Date Admission Type Admitting Provider Attending Provider Department, Room/Bed    18 Emergency Thang Wright MD Sloan, Walker E, MD [facility] 3H, S379/1    Discharge Date Discharge Disposition Discharge Destination         Home or Self Care              Attending Provider:  Thang Wright MD    Allergies:  Codeine, Morphine And Related, Naproxen    Isolation:  None   Infection:  None   Code Status:  CPR    Ht:  157.5 cm (62\")   Wt:  111 kg (245 lb)    Admission Cmt:  None   Principal Problem:  None                Active Insurance as of 2018     Primary Coverage     Payor Plan Insurance Group Employer/Plan Group    MEDICARE MEDICARE A & B      Payor Plan Address Payor Plan Phone Number Effective From Effective To    [address] [phone] 2017     [place] [id number]       Subscriber Name Subscriber Birth Date Member ID       MARIA T GARCIA 1960 [id number]                 Emergency Contacts      (Rel.) Home Phone Work Phone Mobile Phone    Sophia Garcia (Daughter) [phone] -- --               Discharge Summary      Lolita Hein APRN at 2018 11:45 AM              [facility] Medicine Services  DISCHARGE SUMMARY    Patient Name: Maria T Garcia  : 1960  MRN: [id number]    Date of Admission: 2018  Date of Discharge:  2018  Primary Care Physician: Jessica Portillo    Consults     Date and Time Order Name Status Description    2018 1022 Inpatient Cardiology Consult      2018 0155 Inpatient Orthopedic Surgery Consult          Hospital Course     Presenting Problem:   Closed trimalleolar fracture of left ankle, initial encounter " [S82.852A]  Closed trimalleolar fracture of left ankle, initial encounter [S82.852A]  Closed trimalleolar fracture of left ankle, initial encounter [S82.852A]    Active Hospital Problems    Diagnosis Date Noted   • Closed displaced trimalleolar fracture of left ankle [S82.852A] 07/31/2018   • Leukocytosis [D72.829] 07/31/2018   • Polycythemia [D75.1] 07/31/2018   • A-fib (CMS/[facility]) [I48.91] 07/31/2018   • Chronic anticoagulation [Z79.01] 07/31/2018   • Morbid obesity (CMS/[facility]) [E66.01] 07/31/2018   • Chronic combined systolic and diastolic congestive heart failure (CMS/[facility]) [I50.42] 03/14/2018   • COPD (chronic obstructive pulmonary disease) (CMS/[facility]) [J44.9] 12/21/2017   • Diabetes mellitus (CMS/HCC) [E11.9] 12/21/2017   • Tobacco abuse [Z72.0] 12/21/2017      Resolved Hospital Problems    Diagnosis Date Noted Date Resolved   No resolved problems to display.          Hospital Course:  Maria T Garcia is a 58 y.o. female with history of atrial fibrillation, diabetes, CHF presented to the emergency department after she fell.  She stated she was using her walker became dizzy and fell but did not lose consciousness.  She complained of left ankle pain.  In the emergency department she was found to have a left trimalleolar fracture.  On 8/2 Dr. Valle performed a closed reduction of her left trimalleolar ankle fracture.  She has done well and had typical postoperative course.  Spoke with Dr. Valle and he is okay with her discharging home today.  He would like her to elevate the extremity.  She may have touch weightbearing.  She should follow-up with him in 2 weeks for cast check.  He is okay with her resuming her Eliquis.  During this hospitalization she did receive full dose Lovenox.  Therapy recommended inpatient rehabilitation of which she refuses.  She also refused most assistive devices that were recommended.  Her other chronic conditions remained stable throughout hospitalization.    Discharge Follow Up  Recommendations for labs/diagnostics:  Dr. Valle in 2 weeks for a cast check        Day of Discharge     HPI:   Left foot is a little tingly which she was told was normal. Wants to go home    Review of Systems  Gen- No fevers, chills  CV- No chest pain, palpitations  Resp- No cough, dyspnea  GI- No N/V/D, abd pain    Otherwise ROS is negative except as mentioned in the HPI.    Vital Signs:   Temp:  [97.5 °F (36.4 °C)-98.9 °F (37.2 °C)] 98.9 °F (37.2 °C)  Heart Rate:  [78-91] 91  Resp:  [14-18] 16  BP: ()/(52-72) 105/61     Physical Exam:  Gen-no acute distress, resting in bed  CV-RRR, S1 S2 normal, no m/r/g  Resp-CTAB, no wheezes  Abd-soft, NT, ND, +BS  Ext-no edema of right leg, left lower leg in cast  Neuro-A&Ox3, no focal deficits  Psych-appropriate mood      Pertinent  and/or Most Recent Results       Results from last 7 days  Lab Units 08/04/18  0455 08/03/18  0649 08/03/18  0648 08/02/18  0446 07/31/18  0812 07/30/18  2036   WBC 10*3/mm3 10.12  --  11.67* 11.96* 16.17* 18.13*   HEMOGLOBIN g/dL 13.9  --  13.8 13.6 14.6 15.7*   HEMATOCRIT % 44.1*  --  43.4 43.6 45.7* 48.4*   PLATELETS 10*3/mm3 260  --  260 232 248 256   SODIUM mmol/L 137 139  --  139 139 138   POTASSIUM mmol/L 3.2* 3.4*  --  3.5 4.6 3.8   CHLORIDE mmol/L 94* 93*  --  94* 97* 97*   CO2 mmol/L 39.0* 38.0*  --  38.0* 40.0* 35.0*   BUN mg/dL 13 13  --  16 18 18   CREATININE mg/dL 0.96 0.98  --  1.04 1.20 1.17   GLUCOSE mg/dL 110* 104*  --  99 168* 157*   CALCIUM mg/dL 9.1 9.6  --  8.5* 9.1 9.5       Results from last 7 days  Lab Units 07/30/18 2036   BILIRUBIN mg/dL 0.5   ALK PHOS U/L 140*   ALT (SGPT) U/L 21   AST (SGOT) U/L 20       Results from last 7 days  Lab Units 08/03/18  0649   CHOLESTEROL mg/dL 183   TRIGLYCERIDES mg/dL 188*   HDL CHOL mg/dL 35*       Results from last 7 days  Lab Units 07/30/18 2036   BNP pg/mL 18.0     Brief Urine Lab Results  (Last result in the past 365 days)      Color   Clarity   Blood   Leuk Est   Nitrite    Protein   CREAT   Urine HCG        07/30/18 2252 Yellow Clear Negative Trace(A) Negative Negative             Imaging Results (all)     Procedure Component Value Units Date/Time    FL C Arm During Surgery [[id number]] Collected:  08/02/18 1339     Updated:  08/02/18 1439    Narrative:       EXAMINATION: FL C ARM DURING SURGERY- 08/02/2018     INDICATION: Closed Reduction Metatarsal Fracture; S82.852A-Displaced  trimalleolar fracture of left lower leg, initial encounter for closed  fracture; W19.XXXA-Unspecified fall, initial encounter; postreduction  imaging     COMPARISON: NONE     FINDINGS: 5 seconds of fluoroscopy and 2 images used for closed  reduction of an ankle fracture. Please see the procedure report for full  details.       Impression:       Fluoroscopy used for re casting of an ankle fracture. Please  see the procedure report for full details.     D:  08/02/2018  E:  08/02/2018         This report was finalized on 8/2/2018 2:37 PM by Dr. Adrianna Benitez MD.       XR Tibia Fibula 2 View Left [[id number]] Collected:  07/30/18 2116     Updated:  07/30/18 2317    Narrative:       EXAM:  XR Left Tibia and Fibula, 2 Views    CLINICAL HISTORY:  Pain; Lower leg; Left; Additional info: Fall, pain    TECHNIQUE:  Frontal and lateral views of the left tibia and fibula.    COMPARISON:  No relevant prior studies available.    FINDINGS:  Bones/joints:  Trimalleolar fracture of the ankle with lateral and posterior   displacement.  Proximal tibia and fibula appear intact.    Soft tissues:  Diffuse soft tissue swelling.        Impression:       Trimalleolar fracture of the ankle with lateral and posterior displacement.   Proximal tibia and fibula appear intact.      THIS DOCUMENT HAS BEEN ELECTRONICALLY SIGNED BY ANAID CASTILLO MD    XR Ankle 3+ View Left [[id number]] Collected:  07/30/18 2115     Updated:  07/30/18 2315    Narrative:       EXAM:  XR Left Ankle Complete, 3 or More Views    CLINICAL HISTORY:  Pain; Ankle;  Left; Additional info: Left ankle pain    TECHNIQUE:  Frontal, lateral and oblique views of the left ankle.    COMPARISON:  No relevant prior studies available.    FINDINGS:  Bones/joints:  There is fracture through the base of the medial malleolus.    There is fracture through the lateral malleolus superior to the level of the   talar dome.  Coronal oriented fracture through the posterior malleolus.  The   talus, medial malleolus fragment and lateral malleolus fragments are displaced   lateral.  No dislocation.  Soft tissues:  Diffuse soft tissue swelling.      Impression:       Trimalleolar fracture as above.    THIS DOCUMENT HAS BEEN ELECTRONICALLY SIGNED BY ANAID CASTILLO MD    XR Chest 1 View [[id number]] Collected:  07/30/18 2036     Updated:  07/30/18 2201    Narrative:       EXAM:    XR Chest, 1 View    EXAM DATE/TIME:    7/30/2018 8:36 PM    CLINICAL HISTORY:    58 years old, female; Pain; Chest pain; Additional info: Chest pain triage   protocol    TECHNIQUE:    Frontal view of the chest.    COMPARISON:    CR - XR CHEST 1 VW 2018-03-15 06:05    FINDINGS:    Lungs:  Increased central pulmonary and interstitial central markings.    Pleural space:  Unremarkable.  No pneumothorax.    Heart:  Cardiomegaly.    Mediastinum:  Unremarkable.    Bones/joints:  Unremarkable.      Impression:       Cardiomegaly with pulmonary cephalization.    THIS DOCUMENT HAS BEEN ELECTRONICALLY SIGNED BY HONG ORTEGA MD                  Results for orders placed during the hospital encounter of 07/30/18   Adult Transthoracic Echo Complete W/ Cont if Necessary Per Protocol    Narrative · Left ventricular systolic function is normal. Estimated EF = 60%.  · Left ventricular wall thickness is consistent with borderline concentric   hypertrophy.  · The left ventricular cavity is borderline dilated.  · Left ventricular diastolic dysfunction (grade I a) consistent with   impaired relaxation.  · Right ventricular cavity is moderately  dilated.  · Systolic and diastolic flattening of the interventricular septum   consistent with right ventricle pressure and volume overload.  · Right atrial cavity size is mildly dilated.  · Mild mitral valve stenosis is present  · Mild mitral valve regurgitation is present  · Estimated right ventricular systolic pressure from tricuspid   regurgitation is moderately elevated (45-55 mmHg).            Discharge Details        Discharge Medications      New Medications      Instructions Start Date   oxyCODONE-acetaminophen 5-325 MG per tablet  Commonly known as:  PERCOCET   1 tablet, Oral, Every 6 Hours PRN         Continue These Medications      Instructions Start Date   apixaban 5 MG tablet tablet  Commonly known as:  ELIQUIS   5 mg, Oral, 2 Times Daily      aspirin 81 MG EC tablet   81 mg, Oral, Daily      cetirizine 10 MG tablet  Commonly known as:  zyrTEC   10 mg, Oral, Daily      citalopram 40 MG tablet  Commonly known as:  CeleXA   40 mg, Oral, Daily      dofetilide 250 MCG capsule  Commonly known as:  TIKOSYN   250 mcg, Oral, Every 12 Hours Scheduled      gabapentin 300 MG capsule  Commonly known as:  NEURONTIN   300 mg, Oral, Nightly      hydrOXYzine 50 MG capsule  Commonly known as:  VISTARIL   50 mg, Oral, Every Morning      metFORMIN  MG 24 hr tablet  Commonly known as:  GLUCOPHAGE-XR   500 mg, Oral, Every Evening      metoprolol tartrate 25 MG tablet  Commonly known as:  LOPRESSOR   12.5 mg, Oral, Every 12 Hours Scheduled      mometasone-formoterol 200-5 MCG/ACT inhaler  Commonly known as:  DULERA 200   2 puffs, Inhalation, 2 Times Daily - RT      omeprazole 40 MG capsule  Commonly known as:  priLOSEC   40 mg, Oral, Daily      potassium chloride 20 MEQ CR tablet  Commonly known as:  K-DUR,KLOR-CON   20 mEq, Oral, Daily      torsemide 20 MG tablet  Commonly known as:  DEMADEX   20 mg, Oral, Every 12 Hours      VENTOLIN  (90 Base) MCG/ACT inhaler  Generic drug:  albuterol   2 puffs, Inhalation,  Every 6 Hours PRN           Discharge Disposition:  Home or Self Care    Discharge Diet: cardiac, diabetic as tolerated    Discharge Activity: elevate extremity, touch weight bearing    Code Status/Level of Support:  Code Status and Medical Interventions:   Ordered at: 07/31/18 0145     Level Of Support Discussed With:    Patient     Code Status:    CPR     Medical Interventions (Level of Support Prior to Arrest):    Full       Future Appointments  Date Time Provider Department Center   12/3/2018 2:30 PM Sony Toscano DO [facility] None       Additional Instructions for the Follow-ups that You Need to Schedule     Ambulatory Referral to Home Health    As directed      Face to Face Visit Date:  8/3/2018    Follow-up Provider for Plan of Care?:  I will be treating the patient on an ongoing basis.  Please send me the Plan of Care for signature.    Follow-up Provider:  GAYLE VALLE [[id number]]    Reason/Clinical Findings:  s/p ankle fracture    Describe mobility limitations that make leaving home difficult:  impaired functional mobility, balance, gait, and endurance    Nursing/Therapeutic Services Requested:  Physical Therapy Occupational Therapy    PT orders:  Gait Training Transfer training Strengthening    Weight Bearing Status:  Non-Weight Bearing    Occupational orders:  Strengthening         Discharge Follow-up with Specialty: Dr. Valle in 2 weeks for a cast check    As directed      Specialty:  Dr. Valle in 2 weeks for a cast check               Time Spent on Discharge:  38 minutes    Electronically signed by LISSETTE Gasca, 08/04/18, 11:46 AM.        Electronically signed by Lolita Hein APRN at 8/4/2018 11:52 AM

## 2018-08-04 NOTE — PROGRESS NOTES
Continued Stay Note  [facility]     Patient Name: Maria T Garcia  MRN: [id number]  Today's Date: 8/4/2018    Admit Date: 7/30/2018          Discharge Plan     Row Name 08/04/18 1158       Plan    Plan Comments Left voicemail with on-call home health nurse/Matthew.  Asked her to return my call so I can tell her about patient's discharge.                Discharge Codes    No documentation.       Expected Discharge Date and Time     Expected Discharge Date Expected Discharge Time    Aug 4, 2018             Gisselle Hernandez

## 2018-08-05 ENCOUNTER — READMISSION MANAGEMENT (OUTPATIENT)
Dept: CALL CENTER | Facility: HOSPITAL | Age: 58
End: 2018-08-05

## 2018-08-05 NOTE — OUTREACH NOTE
Prep Survey      Responses   Facility patient discharged from?  [place]   Is patient eligible?  Yes   Discharge diagnosis  Closed trimalleolar fracture of left ankle, initial encounter    Does the patient have one of the following disease processes/diagnoses(primary or secondary)?  General Surgery   Does the patient have Home health ordered?  Yes   What is the Home health agency?   [facility]   Is there a DME ordered?  Yes   What DME was ordered?  bsc w/c ordered but pt declined   Prep survey completed?  Yes          Loulou Gutierrez RN

## 2018-08-06 ENCOUNTER — READMISSION MANAGEMENT (OUTPATIENT)
Dept: CALL CENTER | Facility: HOSPITAL | Age: 58
End: 2018-08-06

## 2018-08-06 NOTE — OUTREACH NOTE
General Surgery Week 1 Survey      Responses   Facility patient discharged from?  [place]   Does the patient have one of the following disease processes/diagnoses(primary or secondary)?  General Surgery   Is there a successful TCM telephone encounter documented?  No   Week 1 attempt successful?  No   Rescheduled  Revoked   Revoke  Phone number issues   Wrap up additional comments  1 non working number.          Obdulio Sánchez RN

## 2018-09-10 ENCOUNTER — TELEPHONE (OUTPATIENT)
Dept: CARDIOLOGY | Facility: CLINIC | Age: 58
End: 2018-09-10

## 2018-10-08 RX ORDER — DOFETILIDE 0.25 MG/1
CAPSULE ORAL
Qty: 60 CAPSULE | Refills: 5 | OUTPATIENT
Start: 2018-10-08

## 2018-10-08 RX ORDER — TORSEMIDE 20 MG/1
TABLET ORAL
Qty: 60 TABLET | Refills: 1 | OUTPATIENT
Start: 2018-10-08

## 2018-10-10 RX ORDER — DOFETILIDE 0.25 MG/1
250 CAPSULE ORAL EVERY 12 HOURS SCHEDULED
Qty: 60 CAPSULE | Refills: 0 | Status: SHIPPED | OUTPATIENT
Start: 2018-10-10 | End: 2018-11-19 | Stop reason: SDUPTHER

## 2018-10-10 RX ORDER — TORSEMIDE 10 MG/1
TABLET ORAL
Qty: 60 TABLET | Refills: 0 | Status: SHIPPED | OUTPATIENT
Start: 2018-10-10 | End: 2018-11-13 | Stop reason: SDUPTHER

## 2018-11-13 RX ORDER — TORSEMIDE 10 MG/1
TABLET ORAL
Qty: 60 TABLET | Refills: 0 | Status: SHIPPED | OUTPATIENT
Start: 2018-11-13 | End: 2018-11-19

## 2018-11-13 NOTE — TELEPHONE ENCOUNTER
Patient advised on ly 30 days will be called in and she will need to make an appt for further refills as she has no showed to 2 of the last three appts, and cancelled the third.  Understanding and agreement verbalized.

## 2018-11-19 ENCOUNTER — OFFICE VISIT (OUTPATIENT)
Dept: CARDIOLOGY | Facility: CLINIC | Age: 58
End: 2018-11-19

## 2018-11-19 VITALS
BODY MASS INDEX: 46.01 KG/M2 | DIASTOLIC BLOOD PRESSURE: 64 MMHG | HEART RATE: 81 BPM | WEIGHT: 250 LBS | SYSTOLIC BLOOD PRESSURE: 104 MMHG | HEIGHT: 62 IN

## 2018-11-19 DIAGNOSIS — I48.92 ATRIAL FLUTTER, UNSPECIFIED TYPE (HCC): ICD-10-CM

## 2018-11-19 DIAGNOSIS — I50.41 ACUTE COMBINED SYSTOLIC AND DIASTOLIC HEART FAILURE (HCC): Primary | ICD-10-CM

## 2018-11-19 DIAGNOSIS — I48.91 ATRIAL FIBRILLATION WITH RAPID VENTRICULAR RESPONSE (HCC): ICD-10-CM

## 2018-11-19 PROCEDURE — 99214 OFFICE O/P EST MOD 30 MIN: CPT | Performed by: INTERNAL MEDICINE

## 2018-11-19 PROCEDURE — 93000 ELECTROCARDIOGRAM COMPLETE: CPT | Performed by: INTERNAL MEDICINE

## 2018-11-19 RX ORDER — FUROSEMIDE 40 MG/1
40 TABLET ORAL DAILY
Qty: 30 TABLET | Refills: 11 | Status: SHIPPED | OUTPATIENT
Start: 2018-11-19 | End: 2018-11-19 | Stop reason: SDUPTHER

## 2018-11-19 RX ORDER — FUROSEMIDE 40 MG/1
40 TABLET ORAL DAILY
Qty: 90 TABLET | Refills: 3 | Status: SHIPPED | OUTPATIENT
Start: 2018-11-19

## 2018-11-19 RX ORDER — DOFETILIDE 0.25 MG/1
250 CAPSULE ORAL EVERY 12 HOURS SCHEDULED
Qty: 180 CAPSULE | Refills: 3 | Status: SHIPPED | OUTPATIENT
Start: 2018-11-19

## 2018-11-19 NOTE — PROGRESS NOTES
[facility] AT [address], [address]  [place], [place], [id number] [phone]  [email or web address]           OUTPATIENT CLINIC FOLLOW UP NOTE    Patient Care Team:  Patient Care Team:  Jessica Portillo as PCP - General (Nurse Practitioner)    Subjective:      Chief Complaint   Patient presents with   • Sustained SVT       HPI:    Maria T Garcia is a 58 y.o. female.  History of Present Illness    The patient is a history of chronic systolic and diastolic heart failure, atrial flutter/atrial tachycardia currently on tikosyn, SVT, COPD, diabetes mellitus, PE on NOAC, and tobacco dependence.  She presents today for follow-up.    Since the patient was discharged from the hospital she reports that she has been doing well.  She does have instances of lower extremity edema, but notes that this typically occurs when she has drank too much soda.  She would like her torsemide switched back to furosemide.  She denies any chest pain, shortness of breath, palpitations, lightheadedness, syncope, orthopnea, or PND.  She reports that she gets her boot off her foot in December when she follows up with ortho.    Review of Systems:  Positive for lower extremity edema  Negative for exertional chest pain, dyspnea with exertion, orthopnea, PND, palpitations, lightheadedness, syncope.     PFSH:  Patient Active Problem List   Diagnosis   • Sustained SVT (CMS/HCC)   • COPD (chronic obstructive pulmonary disease) (CMS/HCC)   • Diabetes mellitus (CMS/HCC)   • Neuropathy   • Tobacco abuse   • CHF (congestive heart failure) (CMS/HCC)   • Class 3 obesity in adult   • Atrial fibrillation with rapid ventricular response (CMS/HCC)   • Pulmonary embolism (CMS/HCC)   • Acute combined systolic and diastolic heart failure (CMS/HCC)   • Chronic combined systolic and diastolic congestive heart failure (CMS/HCC)   • Closed displaced trimalleolar fracture of left ankle   • Leukocytosis   • Polycythemia   • A-fib  "(CMS/[facility])   • Chronic anticoagulation   • Morbid obesity (CMS/[facility])         Current Outpatient Medications:   •  albuterol (VENTOLIN HFA) 108 (90 Base) MCG/ACT inhaler, Inhale 2 puffs Every 6 (Six) Hours As Needed for Wheezing., Disp: , Rfl:   •  apixaban (ELIQUIS) 5 MG tablet tablet, Take 5 mg by mouth 2 (Two) Times a Day., Disp: , Rfl:   •  aspirin 81 MG EC tablet, Take 1 tablet by mouth Daily., Disp: 30 tablet, Rfl: 5  •  cetirizine (zyrTEC) 10 MG tablet, Take 10 mg by mouth Daily., Disp: , Rfl:   •  citalopram (CeleXA) 40 MG tablet, Take 40 mg by mouth Daily., Disp: , Rfl:   •  dofetilide (TIKOSYN) 250 MCG capsule, Take 1 capsule by mouth Every 12 (Twelve) Hours., Disp: 60 capsule, Rfl: 0  •  hydrOXYzine (VISTARIL) 50 MG capsule, Take 50 mg by mouth Every Morning., Disp: , Rfl:   •  metFORMIN ER (GLUCOPHAGE-XR) 500 MG 24 hr tablet, Take 500 mg by mouth Every Evening., Disp: , Rfl:   •  metoprolol tartrate (LOPRESSOR) 25 MG tablet, Take 0.5 tablets by mouth Every 12 (Twelve) Hours., Disp: 30 tablet, Rfl: 11  •  mometasone-formoterol (DULERA 200) 200-5 MCG/ACT inhaler, Inhale 2 puffs 2 (Two) Times a Day., Disp: , Rfl:   •  omeprazole (priLOSEC) 40 MG capsule, Take 40 mg by mouth Daily., Disp: , Rfl:   •  potassium chloride (K-DUR,KLOR-CON) 20 MEQ CR tablet, Take 20 mEq by mouth Daily., Disp: , Rfl:   •  torsemide (DEMADEX) 10 MG tablet, Take one twice a day, Disp: 60 tablet, Rfl: 0    Allergies   Allergen Reactions   • Codeine Shortness Of Breath   • Morphine And Related Shortness Of Breath   • Naproxen Hives        reports that she has been smoking cigarettes.  She has smoked for the past 41.00 years. she has never used smokeless tobacco.    Family History   Problem Relation Age of Onset   • Arthritis Mother    • Bleeding Disorder Mother    • Heart disease Mother    • Heart disease Father    • Depression Maternal Grandmother          Objective:   Blood pressure 104/64, pulse 81, height 157.5 cm (62\"), weight 113 " kg (250 lb), not currently breastfeeding.  CONSTITUTIONAL: No acute distress, normal affect  RESPIRATORY: Normal effort.  Decreased air movement on oxygen.  CARDIOVASCULAR: Regular rate and rhythm with normal S1 and S2. Without murmur, gallop or rub.  PERIPHERAL VASCULAR: Normal radial pulse. There is no lower extremity edema bilaterally.    Labs:    BUN   Date Value Ref Range Status   08/04/2018 13 9 - 23 mg/dL Final     Creatinine   Date Value Ref Range Status   08/04/2018 0.96 0.60 - 1.30 mg/dL Final     Potassium   Date Value Ref Range Status   08/04/2018 3.2 (L) 3.5 - 5.5 mmol/L Final     ALT (SGPT)   Date Value Ref Range Status   07/30/2018 21 7 - 40 U/L Final     AST (SGOT)   Date Value Ref Range Status   07/30/2018 20 0 - 33 U/L Final       Lab Results   Component Value Date    CHOL 183 08/03/2018     Lab Results   Component Value Date    TRIG 188 (H) 08/03/2018     Lab Results   Component Value Date    HDL 35 (L) 08/03/2018     No components found for: LDLCALC  No results found for: VLDL  No results found for: LDLHDL    Diagnostic Data:      ECG 12 Lead  Date/Time: 11/19/2018 3:53 PM  Performed by: Koby Couch MD  Authorized by: Koby Couch MD   Comparison: compared with previous ECG from 7/30/2018  Similar to previous ECG  Rhythm: sinus rhythm  Rate: normal  BPM: 81  Comments: QRS 90 ms   ms  Nonspecific ST and T wave abnormality              Assessment and Plan:   Maria T was seen today for sustained svt.    Diagnoses and all orders for this visit:    Acute combined systolic and diastolic heart failure (CMS/HCC)  -Euvolemic  -Continue beta blocker.  -Switch torsemide 20mg BID to Lasix 40 mg daily. Can titrate further as needed    Atrial fibrillation with rapid ventricular response (CMS/HCC)  Atrial flutter  -Continue Eliquis, Tikosyn    COPD  -On home oxygen.    - Return in about 6 months (around 5/19/2019).    LISSETTE Esparza obtained past medical, family history, social history, review of  systems and functioned as a scribe for the remainder of the dictation for Dr. Couch.    I, Koby Couch MD, personally performed the services as scribed by the above named individual. I have made any necessary edits and it is both accurate and complete.     Koby Couch MD, MSc, [facility]  Interventional Cardiology  [facility]

## 2018-12-05 ENCOUNTER — TELEPHONE (OUTPATIENT)
Dept: CARDIOLOGY | Facility: CLINIC | Age: 58
End: 2018-12-05

## 2018-12-05 NOTE — TELEPHONE ENCOUNTER
The patient called and left a message. I tried to call her back. No answer. It would not give me an option to leave a message.

## 2018-12-05 NOTE — TELEPHONE ENCOUNTER
Patient called and left a message. I tried to call her back. No answer. It did not give me the option to leave a message.

## 2019-01-28 ENCOUNTER — OFFICE VISIT (OUTPATIENT)
Dept: CARDIOLOGY | Facility: CLINIC | Age: 59
End: 2019-01-28

## 2019-01-28 VITALS
HEIGHT: 62 IN | WEIGHT: 253 LBS | DIASTOLIC BLOOD PRESSURE: 74 MMHG | BODY MASS INDEX: 46.56 KG/M2 | HEART RATE: 103 BPM | SYSTOLIC BLOOD PRESSURE: 122 MMHG

## 2019-01-28 DIAGNOSIS — Z72.0 TOBACCO ABUSE: ICD-10-CM

## 2019-01-28 DIAGNOSIS — I47.1 SUSTAINED SVT (HCC): ICD-10-CM

## 2019-01-28 DIAGNOSIS — I50.22 CHRONIC SYSTOLIC CONGESTIVE HEART FAILURE (HCC): ICD-10-CM

## 2019-01-28 DIAGNOSIS — I48.19 PERSISTENT ATRIAL FIBRILLATION (HCC): Primary | ICD-10-CM

## 2019-01-28 PROCEDURE — 93000 ELECTROCARDIOGRAM COMPLETE: CPT | Performed by: PHYSICIAN ASSISTANT

## 2019-01-28 PROCEDURE — 99214 OFFICE O/P EST MOD 30 MIN: CPT | Performed by: PHYSICIAN ASSISTANT

## 2019-01-28 NOTE — PROGRESS NOTES
[facility] at [facility]   OFFICE NOTE      Maria T Garcia  1960  PCP: Jessica Portillo    SUBJECTIVE:   Maria T Garcia is a 58 y.o. female seen for a follow up visit regarding the following:     CC:Aflutter    Problem List:  1) Acute on chronic systolic heart failure superimposed diastolic heart failure   - EF 46-50%   - 2) Atrial flutter (atypical) / Atrial tachycardia 2: 1 with CL of about 290-300 msec.   - Remote h/o SVT with ablation- data deficit  - s/p MARY/ECV December 2017  - - 3/2018 Tikosyn admission  -Chadsvasc=5, Eliquis  3) COPD on Home O2 at 3L   4)DM Type II  5)Obesity  6)Recent Ankle fracture ORIF  7. Tobacco abuse        HPI:   Pleasant 58-year-old feel presents today for history of heart failure, atrial flutter, and hypertension.  She states since last visit with her office she's been maintaining normal rhythm.  She did have an extra cup of tea daily this was she feels her heart rates elevated.  She denies any significant palpitations otherwise.  She denies any dizziness, near-syncope or syncope.  She denies any chest pain or chest tightness suggesting angina pectoris.  She denies any worsening edema.  She is still recovering from ankle fracture and surgery and her foot remains in a boot.  She is taken her Eliquis on a regular basis has not missed any doses and denies she's having bleeding issues.      ROS:  Review of Symptoms:  General: + weight gain.   Skin: no rashes, lumps, or other skin changes  HEENT: no dizziness, lightheadedness, or vision changes  Respiratory: no cough or hemoptysis  Cardiovascular: + palpitations, and tachycardia  Gastrointestinal: no black/tarry stools or diarrhea  Urinary: no change in frequency or urgency  Peripheral Vascular: no claudication or leg cramps  Musculoskeletal: Ankle fracture and repair.   Psychiatric: no depression or excessive stress  Neurological: no sensory or motor loss, no syncope  Hematologic: no anemia, easy bruising  or bleeding  Endocrine: no thyroid problems, nor heat or cold intolerance    Cardiac PMH: (Old records have been reviewed and summarized below)      Past Medical History, Past Surgical History, Family history, Social History, and Medications were all reviewed with the patient today and updated as necessary.       Current Outpatient Medications:   •  albuterol (VENTOLIN HFA) 108 (90 Base) MCG/ACT inhaler, Inhale 2 puffs Every 6 (Six) Hours As Needed for Wheezing., Disp: , Rfl:   •  apixaban (ELIQUIS) 5 MG tablet tablet, Take 1 tablet by mouth Every 12 (Twelve) Hours., Disp: 180 tablet, Rfl: 3  •  aspirin 81 MG EC tablet, Take 1 tablet by mouth Daily., Disp: 30 tablet, Rfl: 5  •  cetirizine (zyrTEC) 10 MG tablet, Take 10 mg by mouth As Needed., Disp: , Rfl:   •  citalopram (CeleXA) 40 MG tablet, Take 40 mg by mouth Daily., Disp: , Rfl:   •  dofetilide (TIKOSYN) 250 MCG capsule, Take 1 capsule by mouth Every 12 (Twelve) Hours., Disp: 180 capsule, Rfl: 3  •  furosemide (LASIX) 40 MG tablet, Take 1 tablet by mouth Daily., Disp: 90 tablet, Rfl: 3  •  hydrOXYzine (VISTARIL) 50 MG capsule, Take 50 mg by mouth Every Morning., Disp: , Rfl:   •  metFORMIN ER (GLUCOPHAGE-XR) 500 MG 24 hr tablet, Take 500 mg by mouth Every Evening., Disp: , Rfl:   •  metoprolol tartrate (LOPRESSOR) 25 MG tablet, Take 0.5 tablets by mouth Every 12 (Twelve) Hours., Disp: 30 tablet, Rfl: 11  •  omeprazole (priLOSEC) 40 MG capsule, Take 40 mg by mouth Daily., Disp: , Rfl:   •  potassium chloride (K-DUR,KLOR-CON) 20 MEQ CR tablet, Take 20 mEq by mouth Daily., Disp: , Rfl:       Allergies   Allergen Reactions   • Codeine Shortness Of Breath   • Morphine And Related Shortness Of Breath   • Naproxen Hives     Patient Active Problem List   Diagnosis   • Sustained SVT (CMS/HCC)   • COPD (chronic obstructive pulmonary disease) (CMS/HCC)   • Diabetes mellitus (CMS/HCC)   • Neuropathy   • Tobacco abuse   • CHF (congestive heart failure) (CMS/HCC)   • Class 3  "obesity in adult   • Atrial fibrillation with rapid ventricular response (CMS/HCC)   • Pulmonary embolism (CMS/HCC)   • Acute combined systolic and diastolic heart failure (CMS/HCC)   • Chronic combined systolic and diastolic congestive heart failure (CMS/HCC)   • Closed displaced trimalleolar fracture of left ankle   • Leukocytosis   • Polycythemia   • A-fib (CMS/HCC)   • Chronic anticoagulation   • Morbid obesity (CMS/HCC)     Past Medical History:   Diagnosis Date   • CHF (congestive heart failure) (CMS/HCC)    • COPD (chronic obstructive pulmonary disease) (CMS/HCC)    • Diabetes mellitus (CMS/HCC)    • Hypertension    • Neuropathy      Past Surgical History:   Procedure Laterality Date   • BREAST SURGERY     •  SECTION     • DENTAL PROCEDURE       Family History   Problem Relation Age of Onset   • Arthritis Mother    • Bleeding Disorder Mother    • Heart disease Mother    • Heart disease Father    • Depression Maternal Grandmother      Social History     Tobacco Use   • Smoking status: Current Every Day Smoker     Years: 41.00     Types: Cigarettes   • Smokeless tobacco: Never Used   • Tobacco comment: 2-3 daily    Substance Use Topics   • Alcohol use: No         PHYSICAL EXAM:    /74 (BP Location: Left arm, Patient Position: Sitting)   Pulse 103   Ht 157.5 cm (62\")   Wt 115 kg (253 lb)   BMI 46.27 kg/m²        Wt Readings from Last 5 Encounters:   19 115 kg (253 lb)   18 113 kg (250 lb)   18 111 kg (245 lb)   18 118 kg (260 lb)   18 109 kg (240 lb 6.4 oz)       BP Readings from Last 5 Encounters:   19 122/74   18 104/64   18 94/60   18 118/74   18 96/45       General appearance - Alert, well appearing, and in no distress   Mental status - Affect appropriate to mood.  Eyes - Sclerae anicteric,  ENMT - Hearing grossly normal bilaterally, Dental hygiene good.  Neck - Carotids upstroke normal bilaterally, no bruits, no JVD.  Resp - Clear " to auscultation, no wheezes, rales or rhonchi, symmetric air entry.  Heart - Normal rate, regular rhythm, normal S1, S2, no murmurs, rubs, clicks or gallops.  GI - Soft, nontender, nondistended, no masses or organomegaly.  Neurological - Grossly intact - normal speech, no focal findings  Musculoskeletal - No joint tenderness, deformity or swelling, no muscular tenderness noted.  Extremities - Peripheral pulses normal, no pedal edema, no clubbing or cyanosis.  Skin - Normal coloration and turgor.  Psych -  oriented to person, place, and time.    Medical problems and test results were reviewed with the patient today.     No results found for this or any previous visit (from the past 672 hour(s)).      EKG: (EKG has been independently visualized by me and summarized below)  11/19/18    ECG 12 Lead  Date/Time: 1/28/2019 2:22 PM  Performed by: Jose Hector PA  Authorized by: Jose Hector PA   Comparison: compared with previous ECG from 11/19/2018  Similar to previous ECG  Rhythm: sinus rhythm and sinus tachycardia  Rate: normal  Conduction: conduction normal  ST Segments: ST segments normal  T Waves: T waves normal  QRS axis: normal  Other: no other findings  Clinical impression: normal ECG              Echocardiogram 7/18    · Left ventricular systolic function is normal. Estimated EF = 60%.  · Left ventricular wall thickness is consistent with borderline concentric hypertrophy.  · The left ventricular cavity is borderline dilated.  · Left ventricular diastolic dysfunction (grade I a) consistent with impaired relaxation.  · Right ventricular cavity is moderately dilated.  · Systolic and diastolic flattening of the interventricular septum consistent with right ventricle pressure and volume overload.  · Right atrial cavity size is mildly dilated.  · Mild mitral valve stenosis is present  · Mild mitral valve regurgitation is present  · Estimated right ventricular systolic pressure from tricuspid regurgitation  is moderately elevated (45-55 mmHg).     ASSESSMENT   1. Atrial Flutter: Tikosyn 250 mcg BID, BB.   2. HTN: controlled  3. Hx of DHF  4. Obesity/COPD-Home oxygen   5. Anticoagulation:  Chadsvasc=5, Eliquis 5mg BID.     PLAN  · Continue current medications, Monitor EKG's  · Follow up with Dr. Ann 6 months    1/28/2019  2:14 PM    Will Krunal INIGUEZ

## 2019-02-10 ENCOUNTER — NURSE TRIAGE (OUTPATIENT)
Dept: CALL CENTER | Facility: HOSPITAL | Age: 59
End: 2019-02-10

## 2019-02-10 NOTE — TELEPHONE ENCOUNTER
"She is not allowed to have grapefruit with her medication. Explained that she needs to follow the PCP instructions.     Reason for Disposition  • Health Information question, no triage required and triager able to answer question    Additional Information  • Negative: [1] Caller is not with the adult (patient) AND [2] reporting urgent symptoms  • Negative: Lab result questions  • Negative: Medication questions  • Negative: Caller cannot be reached by phone  • Negative: Caller has already spoken to PCP or another triager  • Negative: RN needs further essential information from caller in order to complete triage  • Negative: Requesting regular office appointment  • Negative: [1] Caller requesting NON-URGENT health information AND [2] PCP's office is the best resource    Answer Assessment - Initial Assessment Questions  1. REASON FOR CALL or QUESTION: \"What is your reason for calling today?\" or \"How can I best help you?\" or \"What question do you have that I can help answer?\"      See the note    Protocols used: INFORMATION ONLY CALL-ADULT-AH      "

## 2019-03-12 ENCOUNTER — HOSPITAL ENCOUNTER (OUTPATIENT)
Age: 59
End: 2019-03-12
Payer: MEDICARE

## 2019-03-12 VITALS
RESPIRATION RATE: 16 BRPM | SYSTOLIC BLOOD PRESSURE: 128 MMHG | DIASTOLIC BLOOD PRESSURE: 78 MMHG | OXYGEN SATURATION: 91 % | HEART RATE: 136 BPM

## 2019-03-12 VITALS — HEART RATE: 118 BPM

## 2019-03-12 DIAGNOSIS — J44.9: Primary | ICD-10-CM

## 2019-03-12 PROCEDURE — 94618 PULMONARY STRESS TESTING: CPT

## 2019-03-12 PROCEDURE — 94729 DIFFUSING CAPACITY: CPT

## 2019-03-12 PROCEDURE — 94060 EVALUATION OF WHEEZING: CPT

## 2019-03-12 PROCEDURE — 94726 PLETHYSMOGRAPHY LUNG VOLUMES: CPT

## 2019-03-12 PROCEDURE — 94640 AIRWAY INHALATION TREATMENT: CPT

## 2019-03-30 NOTE — OP NOTE
Date; August 2, 2018    Preoperative diagnosis; left unstable and displaced trimalleolar ankle fracture    Postoperative diagnosis; same    Procedure; closed reduction and well-padded splint application left ankle    Surgeon; Waqar Valle M.D.    Assistant; Neelam Prather physician assistant medically required for maintenance of reduction and splint application    Anesthesia; general with mask sedation    Complications; none noted    Estimated blood loss; none    Implants; none    Indications; 58-year-old limited household ambulator with a rolling walker and significant medical comorbidities.  Please see consultation note.  Long discussion and the decision was made to proceed with nonoperative care due to her high surgical risks.  She understood the significant risk of long-term deformity, nonunion , dysfunction and pain in the ankle.  Informed consent was obtained    Procedure performed; identified and marked.  Consent confirmed.  After the induction of anesthesia the previous splint from the emergency room was removed.  Skin was carefully cleaned.  We utilized a combination of flexing the knee to relax the Achilles, anterior pressure on the calcaneus and medial translation.  We held by the great toe to maximize rotation.  We applied an extremely well-padded splint.  This included a bulky Boyce dressing.  Plaster posterior and stirrup application which was molded.  No sharp or rough edges.  At the conclusion we brought in fluoroscopy and confirmed acceptable alignment.  There were still very subtle posterior lateral translation.  The patient was awakened and accompanied to the recovery room    Stable to the PACU  
English

## 2019-05-16 NOTE — PROGRESS NOTES
[facility] AT [address], [address]  [place], [place], [address] [phone]  [facility]           OUTPATIENT CLINIC FOLLOW UP NOTE    Patient Care Team:  Patient Care Team:  Jessica Portillo as PCP - General (Nurse Practitioner)    Subjective:      Chief Complaint   Patient presents with   • Atrial Fibrillation   • Congestive Heart Failure       HPI:    Maria T Garcia is a 59 y.o. female.  The patient is a history of chronic systolic and diastolic heart failure, atrial flutter/atrial tachycardia currently on tikosyn, SVT, COPD, diabetes mellitus,  ASD closure by Dr. Phillips 2013, PE on NOAC, and tobacco dependence.  She presents today for follow-up.    Since her last visit in the cardiology clinic she is been stable from a cardiac standpoint.  Has mild rare palpitations that are self-limiting, less than once a month.  She has chronic dyspnea with exertion that improves with diuresis and nasal cannula oxygen as well as rest.  She denies worsening dyspnea with exertion.  No chest pain.    Review of Systems:  Positive for chronic dyspnea, lower extremity swelling  Negative for exertional chest pain, orthopnea, PND, palpitations, lightheadedness, syncope.     PFSH:  Patient Active Problem List   Diagnosis   • Sustained SVT (CMS/HCC)   • COPD (chronic obstructive pulmonary disease) (CMS/HCC)   • Diabetes mellitus (CMS/HCC)   • Neuropathy   • Tobacco abuse   • Class 3 obesity in adult   • Atrial fibrillation with rapid ventricular response (CMS/HCC)   • Pulmonary embolism (CMS/HCC)   • Chronic diastolic heart failure (CMS/HCC)   • Closed displaced trimalleolar fracture of left ankle   • Leukocytosis   • Polycythemia   • Chronic anticoagulation   • Morbid obesity (CMS/HCC)         Current Outpatient Medications:   •  albuterol (VENTOLIN HFA) 108 (90 Base) MCG/ACT inhaler, Inhale 2 puffs Every 6 (Six) Hours As Needed for Wheezing., Disp: , Rfl:   •  apixaban (ELIQUIS)  "5 MG tablet tablet, Take 1 tablet by mouth Every 12 (Twelve) Hours., Disp: 180 tablet, Rfl: 3  •  aspirin 81 MG EC tablet, Take 1 tablet by mouth Daily., Disp: 30 tablet, Rfl: 5  •  cetirizine (zyrTEC) 10 MG tablet, Take 10 mg by mouth As Needed., Disp: , Rfl:   •  citalopram (CeleXA) 40 MG tablet, Take 40 mg by mouth Daily., Disp: , Rfl:   •  dofetilide (TIKOSYN) 250 MCG capsule, Take 1 capsule by mouth Every 12 (Twelve) Hours., Disp: 180 capsule, Rfl: 3  •  furosemide (LASIX) 40 MG tablet, Take 1 tablet by mouth Daily., Disp: 90 tablet, Rfl: 3  •  hydrOXYzine (VISTARIL) 50 MG capsule, Take 50 mg by mouth Every Morning., Disp: , Rfl:   •  metFORMIN ER (GLUCOPHAGE-XR) 500 MG 24 hr tablet, Take 1,000 mg by mouth 2 (Two) Times a Day., Disp: , Rfl:   •  metoprolol tartrate (LOPRESSOR) 25 MG tablet, Take 0.5 tablets by mouth Every 12 (Twelve) Hours., Disp: 30 tablet, Rfl: 11  •  Omega-3 Fatty Acids (FISH OIL) 1000 MG capsule capsule, Take  by mouth Daily With Breakfast., Disp: , Rfl:   •  omeprazole (priLOSEC) 40 MG capsule, Take 40 mg by mouth Daily., Disp: , Rfl:   •  potassium chloride (K-DUR,KLOR-CON) 20 MEQ CR tablet, Take 20 mEq by mouth Daily., Disp: , Rfl:   •  glipiZIDE (GLUCOTROL XL) 5 MG ER tablet, TAKE 1 TABLET ONCE A DAY WITH A MEAL, Disp: , Rfl: 0    Allergies   Allergen Reactions   • Codeine Shortness Of Breath   • Morphine And Related Shortness Of Breath   • Naproxen Hives        reports that she has been smoking cigarettes.  She has a 41.00 pack-year smoking history. She has never used smokeless tobacco.    Family History   Problem Relation Age of Onset   • Arthritis Mother    • Bleeding Disorder Mother    • Heart disease Mother    • Heart disease Father    • Depression Maternal Grandmother          Objective:   Blood pressure 112/60, pulse 100, height 160 cm (63\"), weight 117 kg (259 lb), not currently breastfeeding.  CONSTITUTIONAL: No acute distress, normal affect  RESPIRATORY: Normal effort.  " Increased effort, diminished breath sounds bilaterally, no wheeze today  CARDIOVASCULAR: Regular rate and rhythm with normal S1 and S2. Without murmur, gallop or rub.  PERIPHERAL VASCULAR: Normal radial pulses bilaterally. There is no peripheral edema bilaterally.    Labs:    BUN   Date Value Ref Range Status   08/04/2018 13 9 - 23 mg/dL Final     Creatinine   Date Value Ref Range Status   08/04/2018 0.96 0.60 - 1.30 mg/dL Final     Potassium   Date Value Ref Range Status   08/04/2018 3.2 (L) 3.5 - 5.5 mmol/L Final     ALT (SGPT)   Date Value Ref Range Status   07/30/2018 21 7 - 40 U/L Final     AST (SGOT)   Date Value Ref Range Status   07/30/2018 20 0 - 33 U/L Final       Lab Results   Component Value Date    CHOL 183 08/03/2018     Lab Results   Component Value Date    TRIG 188 (H) 08/03/2018     Lab Results   Component Value Date    HDL 35 (L) 08/03/2018     No components found for: LDLCALC  No results found for: VLDL  No results found for: LDLHDL    Diagnostic Data:      ECG 12 Lead  Date/Time: 5/20/2019 3:34 PM  Performed by: Koby Couch MD  Authorized by: Koby Couch MD   Comparison: compared with previous ECG from 1/28/2019  Similar to previous ECG  Rhythm: sinus rhythm  QRS axis: right  Comments: Incomplete right bundle branch block, heart rate 100, QRS 96, QTc 495            Assessment and Plan:   Maria T was seen today for sustained svt.    Diagnoses and all orders for this visit:       Chronic diastolic heart failure (CMS/HCC)  -Euvolemic  -Continue beta blocker.  -Doing well on Lasix.  Did not tolerate torsemide well      Atrial fibrillation with rapid ventricular response (CMS/HCC)  Atrial flutter  -Continue Eliquis, Tikosyn, acceptable EKG     COPD  -On home oxygen.    - Return for Follow-up with Dr. Ann as previously scheduled.    Koby Couch MD, MSc, [facility]  Interventional Cardiology  [facility] at [facility]

## 2019-05-20 ENCOUNTER — OFFICE VISIT (OUTPATIENT)
Dept: CARDIOLOGY | Facility: CLINIC | Age: 59
End: 2019-05-20

## 2019-05-20 VITALS
SYSTOLIC BLOOD PRESSURE: 112 MMHG | HEART RATE: 100 BPM | BODY MASS INDEX: 45.89 KG/M2 | DIASTOLIC BLOOD PRESSURE: 60 MMHG | WEIGHT: 259 LBS | HEIGHT: 63 IN

## 2019-05-20 DIAGNOSIS — I47.1 SUSTAINED SVT (HCC): ICD-10-CM

## 2019-05-20 DIAGNOSIS — I48.91 ATRIAL FIBRILLATION WITH RAPID VENTRICULAR RESPONSE (HCC): ICD-10-CM

## 2019-05-20 DIAGNOSIS — I50.32 CHRONIC DIASTOLIC HEART FAILURE (HCC): Primary | ICD-10-CM

## 2019-05-20 PROBLEM — I50.42 CHRONIC COMBINED SYSTOLIC AND DIASTOLIC CONGESTIVE HEART FAILURE (HCC): Status: RESOLVED | Noted: 2018-03-14 | Resolved: 2019-05-20

## 2019-05-20 PROBLEM — I50.9 CHF (CONGESTIVE HEART FAILURE) (HCC): Status: RESOLVED | Noted: 2017-12-21 | Resolved: 2019-05-20

## 2019-05-20 PROCEDURE — 99213 OFFICE O/P EST LOW 20 MIN: CPT | Performed by: INTERNAL MEDICINE

## 2019-05-20 PROCEDURE — 93000 ELECTROCARDIOGRAM COMPLETE: CPT | Performed by: INTERNAL MEDICINE

## 2019-05-20 RX ORDER — CHLORAL HYDRATE 500 MG
CAPSULE ORAL
COMMUNITY

## 2019-05-20 RX ORDER — GLIPIZIDE 5 MG/1
TABLET, FILM COATED, EXTENDED RELEASE ORAL
Refills: 0 | COMMUNITY
Start: 2019-04-18

## 2020-11-04 ENCOUNTER — HOSPITAL ENCOUNTER (EMERGENCY)
Age: 60
Discharge: HOME | End: 2020-11-04
Payer: MEDICARE

## 2020-11-04 VITALS
BODY MASS INDEX: 47 KG/M2 | OXYGEN SATURATION: 89 % | HEART RATE: 88 BPM | TEMPERATURE: 98.42 F | DIASTOLIC BLOOD PRESSURE: 69 MMHG | RESPIRATION RATE: 20 BRPM | SYSTOLIC BLOOD PRESSURE: 124 MMHG

## 2020-11-04 VITALS
DIASTOLIC BLOOD PRESSURE: 56 MMHG | OXYGEN SATURATION: 98 % | SYSTOLIC BLOOD PRESSURE: 112 MMHG | HEART RATE: 65 BPM | RESPIRATION RATE: 16 BRPM | TEMPERATURE: 98.2 F

## 2020-11-04 DIAGNOSIS — E11.9: ICD-10-CM

## 2020-11-04 DIAGNOSIS — F17.210: ICD-10-CM

## 2020-11-04 DIAGNOSIS — R04.0: Primary | ICD-10-CM

## 2020-11-04 DIAGNOSIS — I10: ICD-10-CM

## 2020-11-04 DIAGNOSIS — E78.5: ICD-10-CM

## 2020-11-04 DIAGNOSIS — J44.9: ICD-10-CM

## 2020-11-04 DIAGNOSIS — I47.1: ICD-10-CM

## 2020-11-04 DIAGNOSIS — Z95.0: ICD-10-CM

## 2020-11-04 DIAGNOSIS — I48.0: ICD-10-CM

## 2020-11-04 DIAGNOSIS — K21.9: ICD-10-CM

## 2020-11-04 DIAGNOSIS — J45.909: ICD-10-CM

## 2020-11-04 PROCEDURE — 99282 EMERGENCY DEPT VISIT SF MDM: CPT

## 2020-11-16 ENCOUNTER — TELEPHONE (OUTPATIENT)
Dept: CARDIOLOGY | Facility: CLINIC | Age: 60
End: 2020-11-16

## 2020-11-16 NOTE — TELEPHONE ENCOUNTER
Patient called to report that she has been having frequent nose bleeds. Patient states that these are not constant. Patient on home O2 and CPAP. Patient advised to continue Eliquis at this time. Pt has appt with PCP this afternoon. Patient to reach out to office if additional recommendations from cardiology are needed.

## 2021-01-01 NOTE — PLAN OF CARE
Problem: Knowledge deficit - Parent/Caregiver  Goal: Family involved in care of child  Outcome: PROGRESSING AS EXPECTED  Note: Spoke with MOB and provided update on phone regarding infant's status and POC. MOB aware infant requiring blood transfusion and aware of follow up abdominal xray. All questions answered at this time.      Problem: Infection  Goal: Elimination of Infection  Note: ABX administered per MAR. Follow up CBC sent, results pending.      Problem: Oxygenation/Respiratory Function  Goal: Assisted ventilation to facilitate gas exchange  Note: Infant stable on conventional vent at 22/5, with a rate of 35, FiO2: 23-27% this shift. No A/B's. Infant had occasional Td's, all self-recovered. Infant requiring frequent ET suctioning. Weaning O2 as tolerated.      Problem: Fluid and Electrolyte imbalance  Goal: Promotion of Fluid Balance  Note: Infant received 36cc PRBC transfusion. No lasix ordered per MD d/t unstable Na.      Problem: Glucose Imbalance  Goal: Maintains blood glucose between  mg/dl  Note: Blood glucoses WNL this shift.      Problem: Nutrition/Feeding  Goal: Balanced Nutritional Intake  Note: Infant remains NPO. IVF infusing via PICC.       Problem: Fall Risk (Adult)  Goal: Identify Related Risk Factors and Signs and Symptoms  Outcome: Ongoing (interventions implemented as appropriate)    Goal: Absence of Fall  Outcome: Ongoing (interventions implemented as appropriate)      Problem: Patient Care Overview  Goal: Plan of Care Review  Outcome: Ongoing (interventions implemented as appropriate)    Goal: Individualization and Mutuality  Outcome: Ongoing (interventions implemented as appropriate)    Goal: Discharge Needs Assessment  Outcome: Ongoing (interventions implemented as appropriate)    Goal: Interprofessional Rounds/Family Conf  Outcome: Ongoing (interventions implemented as appropriate)

## 2021-01-24 ENCOUNTER — APPOINTMENT (OUTPATIENT)
Dept: GENERAL RADIOLOGY | Facility: HOSPITAL | Age: 61
End: 2021-01-24

## 2021-01-24 ENCOUNTER — HOSPITAL ENCOUNTER (EMERGENCY)
Facility: HOSPITAL | Age: 61
Discharge: HOME | End: 2021-01-25
Attending: EMERGENCY MEDICINE | Admitting: EMERGENCY MEDICINE

## 2021-01-24 DIAGNOSIS — I48.92 ATRIAL FLUTTER WITH RAPID VENTRICULAR RESPONSE (HCC): Primary | ICD-10-CM

## 2021-01-24 DIAGNOSIS — I50.21 ACUTE SYSTOLIC CONGESTIVE HEART FAILURE (HCC): ICD-10-CM

## 2021-01-24 DIAGNOSIS — R09.02 HYPOXIA: ICD-10-CM

## 2021-01-24 DIAGNOSIS — Z87.09 HISTORY OF COPD: ICD-10-CM

## 2021-01-24 LAB
QT INTERVAL: 316 MS
QTC INTERVAL: 499 MS

## 2021-01-24 PROCEDURE — 85025 COMPLETE CBC W/AUTO DIFF WBC: CPT | Performed by: EMERGENCY MEDICINE

## 2021-01-24 PROCEDURE — 83735 ASSAY OF MAGNESIUM: CPT | Performed by: EMERGENCY MEDICINE

## 2021-01-24 PROCEDURE — 84443 ASSAY THYROID STIM HORMONE: CPT | Performed by: EMERGENCY MEDICINE

## 2021-01-24 PROCEDURE — 93005 ELECTROCARDIOGRAM TRACING: CPT | Performed by: EMERGENCY MEDICINE

## 2021-01-24 PROCEDURE — 99285 EMERGENCY DEPT VISIT HI MDM: CPT

## 2021-01-24 PROCEDURE — 80053 COMPREHEN METABOLIC PANEL: CPT | Performed by: EMERGENCY MEDICINE

## 2021-01-24 PROCEDURE — 84484 ASSAY OF TROPONIN QUANT: CPT | Performed by: EMERGENCY MEDICINE

## 2021-01-24 PROCEDURE — 83880 ASSAY OF NATRIURETIC PEPTIDE: CPT | Performed by: EMERGENCY MEDICINE

## 2021-01-24 RX ORDER — ADENOSINE 3 MG/ML
INJECTION, SOLUTION INTRAVENOUS
Status: DISCONTINUED
Start: 2021-01-24 | End: 2021-01-25 | Stop reason: HOSPADM

## 2021-01-24 RX ORDER — SODIUM CHLORIDE 0.9 % (FLUSH) 0.9 %
10 SYRINGE (ML) INJECTION AS NEEDED
Status: DISCONTINUED | OUTPATIENT
Start: 2021-01-24 | End: 2021-01-25 | Stop reason: HOSPADM

## 2021-01-24 RX ORDER — ADENOSINE 3 MG/ML
6 INJECTION, SOLUTION INTRAVENOUS ONCE
Status: COMPLETED | OUTPATIENT
Start: 2021-01-24 | End: 2021-01-25

## 2021-01-25 ENCOUNTER — APPOINTMENT (OUTPATIENT)
Dept: GENERAL RADIOLOGY | Facility: HOSPITAL | Age: 61
End: 2021-01-25

## 2021-01-25 VITALS
HEART RATE: 79 BPM | RESPIRATION RATE: 20 BRPM | WEIGHT: 250 LBS | TEMPERATURE: 98.8 F | DIASTOLIC BLOOD PRESSURE: 76 MMHG | OXYGEN SATURATION: 84 % | HEIGHT: 62 IN | SYSTOLIC BLOOD PRESSURE: 101 MMHG | BODY MASS INDEX: 46.01 KG/M2

## 2021-01-25 LAB
ALBUMIN SERPL-MCNC: 3.5 G/DL (ref 3.5–5.2)
ALBUMIN/GLOB SERPL: 0.7 G/DL
ALP SERPL-CCNC: 131 U/L (ref 39–117)
ALT SERPL W P-5'-P-CCNC: 10 U/L (ref 1–33)
ANION GAP SERPL CALCULATED.3IONS-SCNC: 11 MMOL/L (ref 5–15)
AST SERPL-CCNC: 19 U/L (ref 1–32)
BASOPHILS # BLD AUTO: 0.06 10*3/MM3 (ref 0–0.2)
BASOPHILS NFR BLD AUTO: 0.5 % (ref 0–1.5)
BILIRUB SERPL-MCNC: 0.3 MG/DL (ref 0–1.2)
BUN SERPL-MCNC: 20 MG/DL (ref 6–20)
BUN/CREAT SERPL: 17.5 (ref 7–25)
CALCIUM SPEC-SCNC: 9.1 MG/DL (ref 8.6–10.5)
CHLORIDE SERPL-SCNC: 97 MMOL/L (ref 98–107)
CO2 SERPL-SCNC: 29 MMOL/L (ref 22–29)
CREAT SERPL-MCNC: 1.14 MG/DL (ref 0.57–1)
DEPRECATED RDW RBC AUTO: 51.6 FL (ref 37–54)
EOSINOPHIL # BLD AUTO: 0.12 10*3/MM3 (ref 0–0.4)
EOSINOPHIL NFR BLD AUTO: 1 % (ref 0.3–6.2)
ERYTHROCYTE [DISTWIDTH] IN BLOOD BY AUTOMATED COUNT: 16.1 % (ref 12.3–15.4)
GFR SERPL CREATININE-BSD FRML MDRD: 49 ML/MIN/1.73
GLOBULIN UR ELPH-MCNC: 4.9 GM/DL
GLUCOSE SERPL-MCNC: 141 MG/DL (ref 65–99)
HCT VFR BLD AUTO: 42.7 % (ref 34–46.6)
HGB BLD-MCNC: 12.4 G/DL (ref 12–15.9)
HOLD SPECIMEN: NORMAL
HOLD SPECIMEN: NORMAL
IMM GRANULOCYTES # BLD AUTO: 0.05 10*3/MM3 (ref 0–0.05)
IMM GRANULOCYTES NFR BLD AUTO: 0.4 % (ref 0–0.5)
LYMPHOCYTES # BLD AUTO: 1.61 10*3/MM3 (ref 0.7–3.1)
LYMPHOCYTES NFR BLD AUTO: 13.2 % (ref 19.6–45.3)
MAGNESIUM SERPL-MCNC: 1.9 MG/DL (ref 1.6–2.6)
MCH RBC QN AUTO: 25.5 PG (ref 26.6–33)
MCHC RBC AUTO-ENTMCNC: 29 G/DL (ref 31.5–35.7)
MCV RBC AUTO: 87.7 FL (ref 79–97)
MONOCYTES # BLD AUTO: 0.76 10*3/MM3 (ref 0.1–0.9)
MONOCYTES NFR BLD AUTO: 6.2 % (ref 5–12)
NEUTROPHILS NFR BLD AUTO: 78.7 % (ref 42.7–76)
NEUTROPHILS NFR BLD AUTO: 9.63 10*3/MM3 (ref 1.7–7)
NRBC BLD AUTO-RTO: 0 /100 WBC (ref 0–0.2)
NT-PROBNP SERPL-MCNC: 4072 PG/ML (ref 0–900)
PLATELET # BLD AUTO: 302 10*3/MM3 (ref 140–450)
PMV BLD AUTO: 11 FL (ref 6–12)
POTASSIUM SERPL-SCNC: 4.4 MMOL/L (ref 3.5–5.2)
PROT SERPL-MCNC: 8.4 G/DL (ref 6–8.5)
RBC # BLD AUTO: 4.87 10*6/MM3 (ref 3.77–5.28)
SODIUM SERPL-SCNC: 137 MMOL/L (ref 136–145)
TROPONIN T SERPL-MCNC: <0.01 NG/ML (ref 0–0.03)
TSH SERPL DL<=0.05 MIU/L-ACNC: 2.36 UIU/ML (ref 0.27–4.2)
WBC # BLD AUTO: 12.23 10*3/MM3 (ref 3.4–10.8)
WHOLE BLOOD HOLD SPECIMEN: NORMAL
WHOLE BLOOD HOLD SPECIMEN: NORMAL

## 2021-01-25 PROCEDURE — 96375 TX/PRO/DX INJ NEW DRUG ADDON: CPT

## 2021-01-25 PROCEDURE — 93005 ELECTROCARDIOGRAM TRACING: CPT | Performed by: EMERGENCY MEDICINE

## 2021-01-25 PROCEDURE — 99152 MOD SED SAME PHYS/QHP 5/>YRS: CPT

## 2021-01-25 PROCEDURE — 25010000002 FUROSEMIDE PER 20 MG: Performed by: EMERGENCY MEDICINE

## 2021-01-25 PROCEDURE — 71045 X-RAY EXAM CHEST 1 VIEW: CPT

## 2021-01-25 PROCEDURE — 92960 CARDIOVERSION ELECTRIC EXT: CPT

## 2021-01-25 PROCEDURE — 96374 THER/PROPH/DIAG INJ IV PUSH: CPT

## 2021-01-25 PROCEDURE — 25010000002 ADENOSINE PER 6 MG: Performed by: EMERGENCY MEDICINE

## 2021-01-25 RX ORDER — ADENOSINE 3 MG/ML
12 INJECTION, SOLUTION INTRAVENOUS ONCE
Status: COMPLETED | OUTPATIENT
Start: 2021-01-25 | End: 2021-01-25

## 2021-01-25 RX ORDER — ETOMIDATE 2 MG/ML
10 INJECTION INTRAVENOUS ONCE
Status: COMPLETED | OUTPATIENT
Start: 2021-01-25 | End: 2021-01-25

## 2021-01-25 RX ORDER — FUROSEMIDE 10 MG/ML
80 INJECTION INTRAMUSCULAR; INTRAVENOUS ONCE
Status: COMPLETED | OUTPATIENT
Start: 2021-01-25 | End: 2021-01-25

## 2021-01-25 RX ADMIN — ETOMIDATE 10 MG: 2 INJECTION, SOLUTION INTRAVENOUS at 01:05

## 2021-01-25 RX ADMIN — ADENOSINE 12 MG: 3 INJECTION INTRAVENOUS at 00:20

## 2021-01-25 RX ADMIN — FUROSEMIDE 80 MG: 10 INJECTION, SOLUTION INTRAVENOUS at 01:20

## 2021-01-25 RX ADMIN — ADENOSINE 6 MG: 3 INJECTION INTRAVENOUS at 00:19

## 2021-01-25 NOTE — ED PROVIDER NOTES
EMERGENCY DEPARTMENT ENCOUNTER      Pt Name: Maria T Garcia  MRN: [id number]  YOB: 1960  Date of evaluation: 1/24/2021  Provider: Eduin Price MD    CHIEF COMPLAINT       Chief Complaint   Patient presents with   • Atrial Fibrillation         HISTORY OF PRESENT ILLNESS  (Location/Symptom, Timing/Onset, Context/Setting, Quality, Duration, Modifying Factors, Severity.)   Maria T Garcia is a 60 y.o. female who presents to the emergency department with sudden onset palpitations that began this morning after she got into an argument with her neighbor.  She states that she became extremely anxious and had fluttering in her chest along with some shortness of breath but no associated chest pain.  She notes that she became more short of breath during the course the day.  She denies any chest pain in association with this.  She normally wears 3 L of oxygen at home but states that she had to turn it up to 4 secondary to dyspnea.  She has a history of paroxysmal atrial fibrillation and is currently on Eliquis and endorses strict compliance with this and states that she never misses a dose.  She denies any abdominal pain, vomiting, or sweating in association with her symptoms.  She has no previous history of VTE.      Nursing notes were reviewed.    REVIEW OF SYSTEMS    (2-9 systems for level 4, 10 or more for level 5)   ROS:  General:  No fevers, no chills, no weakness  Cardiovascular: Palpitations  Respiratory: Shortness of breath  Gastrointestinal:  No pain, no nausea, no vomiting, no diarrhea  Musculoskeletal:  No muscle pain, no joint pain  Skin:  No rash, no easy bruising  Neurologic:  No speech problems, no headache, no extremity numbness, no extremity tingling, no extremity weakness  Psychiatric:  No anxiety  Genitourinary:  No dysuria, no hematuria    Except as noted above the remainder of the review of systems was reviewed and negative.       PAST MEDICAL HISTORY     Past Medical History:    Diagnosis Date   • CHF (congestive heart failure) (CMS/[facility])    • COPD (chronic obstructive pulmonary disease) (CMS/[facility])    • Diabetes mellitus (CMS/[facility])    • Hypertension    • Neuropathy          SURGICAL HISTORY       Past Surgical History:   Procedure Laterality Date   • BREAST SURGERY     •  SECTION     • DENTAL PROCEDURE           CURRENT MEDICATIONS       Current Facility-Administered Medications:   •  sodium chloride 0.9 % flush 10 mL, 10 mL, Intravenous, PRN, Eduin Price MD    Current Outpatient Medications:   •  albuterol (VENTOLIN HFA) 108 (90 Base) MCG/ACT inhaler, Inhale 2 puffs Every 6 (Six) Hours As Needed for Wheezing., Disp: , Rfl:   •  apixaban (ELIQUIS) 5 MG tablet tablet, Take 1 tablet by mouth Every 12 (Twelve) Hours., Disp: 180 tablet, Rfl: 3  •  aspirin 81 MG EC tablet, Take 1 tablet by mouth Daily., Disp: 30 tablet, Rfl: 5  •  cetirizine (zyrTEC) 10 MG tablet, Take 10 mg by mouth As Needed., Disp: , Rfl:   •  citalopram (CeleXA) 40 MG tablet, Take 40 mg by mouth Daily., Disp: , Rfl:   •  dofetilide (TIKOSYN) 250 MCG capsule, Take 1 capsule by mouth Every 12 (Twelve) Hours., Disp: 180 capsule, Rfl: 3  •  furosemide (LASIX) 40 MG tablet, Take 1 tablet by mouth Daily., Disp: 90 tablet, Rfl: 3  •  glipiZIDE (GLUCOTROL XL) 5 MG ER tablet, TAKE 1 TABLET ONCE A DAY WITH A MEAL, Disp: , Rfl: 0  •  hydrOXYzine (VISTARIL) 50 MG capsule, Take 50 mg by mouth Every Morning., Disp: , Rfl:   •  metFORMIN ER (GLUCOPHAGE-XR) 500 MG 24 hr tablet, Take 1,000 mg by mouth 2 (Two) Times a Day., Disp: , Rfl:   •  metoprolol tartrate (LOPRESSOR) 25 MG tablet, Take 0.5 tablets by mouth Every 12 (Twelve) Hours., Disp: 30 tablet, Rfl: 11  •  Omega-3 Fatty Acids (FISH OIL) 1000 MG capsule capsule, Take  by mouth Daily With Breakfast., Disp: , Rfl:   •  omeprazole (priLOSEC) 40 MG capsule, Take 40 mg by mouth Daily., Disp: , Rfl:   •  potassium chloride (K-DUR,KLOR-CON) 20 MEQ CR tablet, Take 20 mEq by  mouth Daily., Disp: , Rfl:     ALLERGIES     Codeine, Morphine and related, and Naproxen    FAMILY HISTORY       Family History   Problem Relation Age of Onset   • Arthritis Mother    • Bleeding Disorder Mother    • Heart disease Mother    • Heart disease Father    • Depression Maternal Grandmother           SOCIAL HISTORY       Social History     Socioeconomic History   • Marital status: Single     Spouse name: Not on file   • Number of children: Not on file   • Years of education: Not on file   • Highest education level: Not on file   Tobacco Use   • Smoking status: Current Every Day Smoker     Packs/day: 1.00     Years: 41.00     Pack years: 41.00     Types: Cigarettes   • Smokeless tobacco: Never Used   • Tobacco comment: 2-3 daily    Substance and Sexual Activity   • Alcohol use: No   • Drug use: No   • Sexual activity: Defer         PHYSICAL EXAM    (up to 7 for level 4, 8 or more for level 5)     Vitals:    01/25/21 0220 01/25/21 0232 01/25/21 0239 01/25/21 0250   BP: 101/76      Pulse: 80 81 80 79   Resp: 20      Temp:       TempSrc:       SpO2: 92% 90% 91% (!) 84%   Weight:       Height:           Physical Exam  General: Awake, alert, mild distress  HEENT: Conjunctiva normal.  Neck: Trachea midline.  Cardiac: Tachy, regular rhythm, no murmurs, rubs, or gallops  Lungs: Tachypneic, diffusely coarse  Chest wall: There is no tenderness to palpation over the chest wall or over ribs  Abdomen: Abdomen is soft, nontender, nondistended. There are no firm or pulsatile masses, no rebound rigidity or guarding.   Musculoskeletal: No deformity. Peripheral edema.  Neuro: Alert and oriented x 4.  Dermatology: Skin is warm and dry  Psych: Mentation is grossly normal, cognition is grossly normal. Affect is appropriate.        DIAGNOSTIC RESULTS     EKG: All EKG's are interpreted by the Emergency Department Physician who either signs or Co-signs this chart in the absence of a cardiologist.    Regular tachycardia rate of 150,  no P waves identified. There is RBBB and mild diffuse ST depression    RADIOLOGY:   Non-plain film images such as CT, Ultrasound and MRI are read by the radiologist. Plain radiographic images are visualized and preliminarily interpreted by the emergency physician with the below findings:      [x] Radiologist's Report Reviewed:  XR Chest 1 View   Final Result   Cardiomegaly with vascular congestion and mild right base atelectasis.      Signer Name: Nestor Miller MD    Signed: 1/25/2021 12:18 AM    Workstation Name: [email or web address]     [facility]            LABS:    I have reviewed and interpreted all of the currently available lab results from this visit (if applicable):  Results for orders placed or performed during the hospital encounter of 01/24/21   Comprehensive Metabolic Panel    Specimen: Blood   Result Value Ref Range    Glucose 141 (H) 65 - 99 mg/dL    BUN 20 6 - 20 mg/dL    Creatinine 1.14 (H) 0.57 - 1.00 mg/dL    Sodium 137 136 - 145 mmol/L    Potassium 4.4 3.5 - 5.2 mmol/L    Chloride 97 (L) 98 - 107 mmol/L    CO2 29.0 22.0 - 29.0 mmol/L    Calcium 9.1 8.6 - 10.5 mg/dL    Total Protein 8.4 6.0 - 8.5 g/dL    Albumin 3.50 3.50 - 5.20 g/dL    ALT (SGPT) 10 1 - 33 U/L    AST (SGOT) 19 1 - 32 U/L    Alkaline Phosphatase 131 (H) 39 - 117 U/L    Total Bilirubin 0.3 0.0 - 1.2 mg/dL    eGFR Non African Amer 49 (L) >60 mL/min/1.73    Globulin 4.9 gm/dL    A/G Ratio 0.7 g/dL    BUN/Creatinine Ratio 17.5 7.0 - 25.0    Anion Gap 11.0 5.0 - 15.0 mmol/L   Magnesium    Specimen: Blood   Result Value Ref Range    Magnesium 1.9 1.6 - 2.6 mg/dL   Troponin    Specimen: Blood   Result Value Ref Range    Troponin T <0.010 0.000 - 0.030 ng/mL   TSH    Specimen: Blood   Result Value Ref Range    TSH 2.360 0.270 - 4.200 uIU/mL   BNP    Specimen: Blood   Result Value Ref Range    proBNP 4,072.0 (H) 0.0 - 900.0 pg/mL   CBC Auto Differential    Specimen: Blood   Result Value Ref Range    WBC 12.23 (H) 3.40 -  10.80 10*3/mm3    RBC 4.87 3.77 - 5.28 10*6/mm3    Hemoglobin 12.4 12.0 - 15.9 g/dL    Hematocrit 42.7 34.0 - 46.6 %    MCV 87.7 79.0 - 97.0 fL    MCH 25.5 (L) 26.6 - 33.0 pg    MCHC 29.0 (L) 31.5 - 35.7 g/dL    RDW 16.1 (H) 12.3 - 15.4 %    RDW-SD 51.6 37.0 - 54.0 fl    MPV 11.0 6.0 - 12.0 fL    Platelets 302 140 - 450 10*3/mm3    Neutrophil % 78.7 (H) 42.7 - 76.0 %    Lymphocyte % 13.2 (L) 19.6 - 45.3 %    Monocyte % 6.2 5.0 - 12.0 %    Eosinophil % 1.0 0.3 - 6.2 %    Basophil % 0.5 0.0 - 1.5 %    Immature Grans % 0.4 0.0 - 0.5 %    Neutrophils, Absolute 9.63 (H) 1.70 - 7.00 10*3/mm3    Lymphocytes, Absolute 1.61 0.70 - 3.10 10*3/mm3    Monocytes, Absolute 0.76 0.10 - 0.90 10*3/mm3    Eosinophils, Absolute 0.12 0.00 - 0.40 10*3/mm3    Basophils, Absolute 0.06 0.00 - 0.20 10*3/mm3    Immature Grans, Absolute 0.05 0.00 - 0.05 10*3/mm3    nRBC 0.0 0.0 - 0.2 /100 WBC   ECG 12 Lead   Result Value Ref Range    QT Interval 316 ms    QTC Interval 499 ms   Light Blue Top   Result Value Ref Range    Extra Tube hold for add-on    Green Top (Gel)   Result Value Ref Range    Extra Tube Hold for add-ons.    Lavender Top   Result Value Ref Range    Extra Tube hold for add-on    Gold Top - SST   Result Value Ref Range    Extra Tube Hold for add-ons.         All other labs were within normal range or not returned as of this dictation.      EMERGENCY DEPARTMENT COURSE and DIFFERENTIAL DIAGNOSIS/MDM:   Vitals:    Vitals:    01/25/21 0220 01/25/21 0232 01/25/21 0239 01/25/21 0250   BP: 101/76      Pulse: 80 81 80 79   Resp: 20      Temp:       TempSrc:       SpO2: 92% 90% 91% (!) 84%   Weight:       Height:           ED Course as of Jan 25 0513   Sun Jan 24, 2021   2352 Patient has adamantly declined any treatment until her daughter arrives. I have informed her that this could result in worsening of her current condition and she understands this.    [NS]   Mon Jan 25, 2021   0040 Patient was given consecutive doses of adenosine 6 mg  followed by 12 mg with revealing underlying flutter waves but no cardioversion.  Feel that underlying rhythm is atrial fibrillation/atrial flutter.  I have had a long discussion with the patient regarding administration of medications versus electrical cardioversion and the risks and benefits associated with both.  Given that patient is having worsening of her CHF at this time as result of her elevated heart rate, feel that electrical cardioversion is most appropriate as she is technically unstable as result of this.  I have counseled her regarding the risks and benefits of electrocardioversion, including the possibility of cardiac arrest, dysrhythmia, and airway complications associated with sedation.  She understands these risks and agrees to proceed with the procedure.  We will we will obtain written consent as well.  This discussion was also had with the patient's daughter who is at the bedside who also understands these risks.    [NS]   0112 Patient was successfully electrically cardioverted to sinus rhythm.  We are obtaining repeat ECG now.  We will go ahead and provide her with a dose of IV Lasix given her volume overload and reassess.    [NS]   0230 Patient is feeling much better and breathing more comfortably at this point.  She is currently weaned down to 4 L nasal cannula.  She states that at baseline she wears 3 L at home but occasionally uses 4 or 5 when she needs it.  I have counseled her that I strongly recommend her to stay for admission to continue diuresis however she declines at this time having full capacity to make this decision.  I discussed with her that this could result in significant worsening, worsening shortness of breath, hypoxia, and potentially even death.  She acknowledges this.  This discussion was had with her daughter at the bedside as well and she also participated in the discussion.  She attempted to convince her mother to stay in the hospital as well, however the patient  continues to decline stating that she can do fine at home.  I counseled her to return to the emergency department immediately if she experiences any worsening symptoms and have also encouraged her to follow-up with her primary care provider in the next 1 to 2 days as well as her cardiologist in the next 1 to 2 days.    [NS]      ED Course User Index  [NS] Eduin Price MD       Patient with acute approximate respiratory failure secondary to CHF exacerbation which is secondary to acute atrial fibrillation with rapid ventricular response/atrial flutter.  This responded well to electrical cardioversion with significant improvement in patient's symptoms.  She was also given an IV dose of Lasix with good diuresis and reduction in oxygen requirement.  She ultimately elected to leave the emergency department declining admission with full understanding of the potential risk associate with her decision.    I had a discussion with the patient/family regarding diagnosis, diagnostic results, treatment plan, and medications.  The patient/family indicated understanding of these instructions.  I spent adequate time at the bedside preceding discharge necessary to personally discuss the aftercare instructions, giving patient education, providing explanations of the results of our evaluations/findings, and my decision making to assure that the patient/family understand the plan of care.  Time was allotted to answer questions at that time and throughout the ED course.  Emphasis was placed on timely follow-up after discharge.  I also discussed the potential for the development of an acute emergent condition requiring further evaluation, admission, or even surgical intervention. I discussed that we found nothing during the visit today indicating the need for further workup, admission, or the presence of an unstable medical condition.  I encouraged the patient to return to the emergency department immediately for ANY concerns,  worsening, new complaints, or if symptoms persist and unable to seek follow-up in a timely fashion.  The patient/family expressed understanding and agreement with this plan.  The patient will follow-up with their PCP in 1-2 days for reevaluation.       MEDICATIONS ADMINISTERED IN ED:  Medications   sodium chloride 0.9 % flush 10 mL (has no administration in time range)   adenosine (ADENOCARD) injection 6 mg (6 mg Intravenous Given by Other 1/25/21 0019)   adenosine (ADENOCARD) injection 12 mg (12 mg Intravenous Given by Other 1/25/21 0020)   etomidate (AMIDATE) injection 10 mg (10 mg Intravenous Given by Other 1/25/21 0105)   furosemide (LASIX) injection 80 mg (80 mg Intravenous Given 1/25/21 0120)       PROCEDURES:    Procedural Sedation  Indication: Electrical cardioversion  Consent: Signed by patient  Pre procedure evaluation: ASA class 3, Mallampatti 1, O2 sat 100, the airway normal in appearance without any evidence of anatomic obstruction  Medication: Etomidate 10 mg  Total time: 10 minutes  Complications: None    Electrical Cardioversion  Indication: Atrial flutter with rapid ventricular response with associated pulmonary edema  Consent: Signed by patient  Technique: Patient was sedated using etomidate 10 mg. Pads were placed in a anterior/posterior fashion. Once the patient had attained adequate sedation, synchronization was ensured and the patient was shocked using 200 joules.  Post-Procedure: Rhythm is normal sinus. Patient is hemodynamically stable.        CRITICAL CARE TIME    Approximately 35 minutes of discontinuous critical care time was provided to this patient by myself absent of any time spent performing procedures.  Patient presents critically ill with acute atrial fibrillation/atrial flutter with rapid ventricular response and associated acute pulmonary edema secondary to CHF exacerbation with neurologic, cardiovascular, pulmonary systems at risk requiring the following interventions: Application  of supplemental oxygen, administration of IV adenosine and Lasix, interpretation of labs/ECG/imaging, frequent reassessment with the following response: Resolution of dysrhythmia and improvement in hypoxemia.  Patient at high risk of deterioration and possibly death without these interventions.        FINAL IMPRESSION      1. Atrial flutter with rapid ventricular response (CMS/HCC)    2. Acute systolic congestive heart failure (CMS/HCC)    3. Hypoxia    4. History of COPD          DISPOSITION/PLAN     ED Disposition     ED Disposition Condition Comment    Discharge Stable           PATIENT REFERRED TO:  Karyn Esparza APRN  [address] [id number] [phone]    Schedule an appointment as soon as possible for a visit today      [facility] Emergency Department  [address] [facility] [address] [phone]    If symptoms worsen      DISCHARGE MEDICATIONS:     Medication List      CONTINUE taking these medications    apixaban 5 MG tablet tablet  Commonly known as: ELIQUIS  Take 1 tablet by mouth Every 12 (Twelve) Hours.     aspirin 81 MG EC tablet  Take 1 tablet by mouth Daily.     cetirizine 10 MG tablet  Commonly known as: zyrTEC     citalopram 40 MG tablet  Commonly known as: CeleXA     dofetilide 250 MCG capsule  Commonly known as: TIKOSYN  Take 1 capsule by mouth Every 12 (Twelve) Hours.     fish oil 1000 MG capsule capsule     furosemide 40 MG tablet  Commonly known as: LASIX  Take 1 tablet by mouth Daily.     glipizide 5 MG ER tablet  Commonly known as: GLUCOTROL XL     hydrOXYzine pamoate 50 MG capsule  Commonly known as: VISTARIL     metFORMIN  MG 24 hr tablet  Commonly known as: GLUCOPHAGE-XR     metoprolol tartrate 25 MG tablet  Commonly known as: LOPRESSOR  Take 0.5 tablets by mouth Every 12 (Twelve) Hours.     omeprazole 40 MG capsule  Commonly known as: priLOSEC     potassium chloride 20 MEQ CR tablet  Commonly known as: K-DUR,KLOR-CON      Ventolin  (90 Base) MCG/ACT inhaler  Generic drug: albuterol sulfate HFA                Comment: Please note this report has been produced using speech recognition software.      Eduin Price MD  Attending Emergency Physician               Eduin Price MD  01/25/21 0583

## 2021-01-25 NOTE — DISCHARGE INSTRUCTIONS
Please return to the emergency department immediately if you experience any worsening shortness of breath, chest pain, or any other concerning symptoms.  Please follow-up with both your primary care provider and cardiologist as soon as possible.

## 2021-01-28 LAB
QT INTERVAL: 412 MS
QTC INTERVAL: 495 MS

## 2021-03-16 ENCOUNTER — HOSPITAL ENCOUNTER (EMERGENCY)
Dept: HOSPITAL 22 - ER | Age: 61
Discharge: STILL ADMITTED OR ADMINISTRATIVE | End: 2021-03-16
Payer: MEDICARE

## 2021-03-16 VITALS — HEART RATE: 72 BPM | OXYGEN SATURATION: 94 % | RESPIRATION RATE: 24 BRPM

## 2021-03-16 VITALS — RESPIRATION RATE: 16 BRPM | OXYGEN SATURATION: 93 % | HEART RATE: 134 BPM

## 2021-03-16 VITALS
SYSTOLIC BLOOD PRESSURE: 109 MMHG | RESPIRATION RATE: 14 BRPM | OXYGEN SATURATION: 81 % | HEART RATE: 135 BPM | DIASTOLIC BLOOD PRESSURE: 63 MMHG

## 2021-03-16 VITALS
RESPIRATION RATE: 19 BRPM | DIASTOLIC BLOOD PRESSURE: 56 MMHG | SYSTOLIC BLOOD PRESSURE: 97 MMHG | HEART RATE: 74 BPM | OXYGEN SATURATION: 92 %

## 2021-03-16 VITALS
DIASTOLIC BLOOD PRESSURE: 60 MMHG | OXYGEN SATURATION: 94 % | HEART RATE: 136 BPM | SYSTOLIC BLOOD PRESSURE: 101 MMHG | RESPIRATION RATE: 20 BRPM

## 2021-03-16 VITALS — RESPIRATION RATE: 19 BRPM | HEART RATE: 75 BPM | OXYGEN SATURATION: 98 %

## 2021-03-16 VITALS
DIASTOLIC BLOOD PRESSURE: 63 MMHG | RESPIRATION RATE: 19 BRPM | HEART RATE: 137 BPM | OXYGEN SATURATION: 94 % | SYSTOLIC BLOOD PRESSURE: 99 MMHG

## 2021-03-16 VITALS
OXYGEN SATURATION: 93 % | SYSTOLIC BLOOD PRESSURE: 91 MMHG | RESPIRATION RATE: 20 BRPM | DIASTOLIC BLOOD PRESSURE: 47 MMHG | HEART RATE: 73 BPM

## 2021-03-16 VITALS
DIASTOLIC BLOOD PRESSURE: 40 MMHG | HEART RATE: 73 BPM | RESPIRATION RATE: 16 BRPM | OXYGEN SATURATION: 93 % | SYSTOLIC BLOOD PRESSURE: 90 MMHG

## 2021-03-16 VITALS
SYSTOLIC BLOOD PRESSURE: 115 MMHG | OXYGEN SATURATION: 93 % | HEART RATE: 136 BPM | DIASTOLIC BLOOD PRESSURE: 80 MMHG | RESPIRATION RATE: 18 BRPM

## 2021-03-16 VITALS — RESPIRATION RATE: 17 BRPM | HEART RATE: 135 BPM | DIASTOLIC BLOOD PRESSURE: 50 MMHG | SYSTOLIC BLOOD PRESSURE: 119 MMHG

## 2021-03-16 VITALS
DIASTOLIC BLOOD PRESSURE: 56 MMHG | OXYGEN SATURATION: 93 % | SYSTOLIC BLOOD PRESSURE: 94 MMHG | RESPIRATION RATE: 15 BRPM | HEART RATE: 133 BPM

## 2021-03-16 VITALS
RESPIRATION RATE: 20 BRPM | DIASTOLIC BLOOD PRESSURE: 70 MMHG | TEMPERATURE: 98.4 F | OXYGEN SATURATION: 96 % | SYSTOLIC BLOOD PRESSURE: 97 MMHG | HEART RATE: 70 BPM

## 2021-03-16 VITALS — HEART RATE: 134 BPM | OXYGEN SATURATION: 93 % | RESPIRATION RATE: 14 BRPM

## 2021-03-16 VITALS
RESPIRATION RATE: 15 BRPM | OXYGEN SATURATION: 95 % | DIASTOLIC BLOOD PRESSURE: 64 MMHG | SYSTOLIC BLOOD PRESSURE: 100 MMHG | HEART RATE: 134 BPM

## 2021-03-16 VITALS
DIASTOLIC BLOOD PRESSURE: 57 MMHG | OXYGEN SATURATION: 96 % | SYSTOLIC BLOOD PRESSURE: 109 MMHG | RESPIRATION RATE: 16 BRPM | HEART RATE: 74 BPM

## 2021-03-16 VITALS — OXYGEN SATURATION: 94 % | HEART RATE: 73 BPM | RESPIRATION RATE: 20 BRPM

## 2021-03-16 VITALS — OXYGEN SATURATION: 93 % | RESPIRATION RATE: 21 BRPM | HEART RATE: 71 BPM

## 2021-03-16 VITALS
DIASTOLIC BLOOD PRESSURE: 78 MMHG | RESPIRATION RATE: 39 BRPM | HEART RATE: 138 BPM | SYSTOLIC BLOOD PRESSURE: 132 MMHG | OXYGEN SATURATION: 94 %

## 2021-03-16 VITALS — OXYGEN SATURATION: 96 % | HEART RATE: 77 BPM | RESPIRATION RATE: 19 BRPM

## 2021-03-16 VITALS — OXYGEN SATURATION: 92 % | RESPIRATION RATE: 14 BRPM | HEART RATE: 135 BPM

## 2021-03-16 VITALS
DIASTOLIC BLOOD PRESSURE: 78 MMHG | HEART RATE: 137 BPM | SYSTOLIC BLOOD PRESSURE: 132 MMHG | RESPIRATION RATE: 17 BRPM | OXYGEN SATURATION: 92 %

## 2021-03-16 VITALS — BODY MASS INDEX: 58.7 KG/M2

## 2021-03-16 VITALS — RESPIRATION RATE: 21 BRPM | HEART RATE: 133 BPM | OXYGEN SATURATION: 91 %

## 2021-03-16 VITALS — OXYGEN SATURATION: 90 % | RESPIRATION RATE: 17 BRPM | HEART RATE: 73 BPM

## 2021-03-16 VITALS
DIASTOLIC BLOOD PRESSURE: 42 MMHG | RESPIRATION RATE: 18 BRPM | SYSTOLIC BLOOD PRESSURE: 102 MMHG | HEART RATE: 75 BPM | OXYGEN SATURATION: 76 %

## 2021-03-16 VITALS — HEART RATE: 74 BPM | RESPIRATION RATE: 22 BRPM

## 2021-03-16 VITALS — HEART RATE: 77 BPM | RESPIRATION RATE: 18 BRPM

## 2021-03-16 VITALS — RESPIRATION RATE: 16 BRPM | HEART RATE: 73 BPM | OXYGEN SATURATION: 93 %

## 2021-03-16 DIAGNOSIS — I48.92: ICD-10-CM

## 2021-03-16 DIAGNOSIS — N28.9: ICD-10-CM

## 2021-03-16 DIAGNOSIS — Z20.822: ICD-10-CM

## 2021-03-16 DIAGNOSIS — K21.9: ICD-10-CM

## 2021-03-16 DIAGNOSIS — J96.22: Primary | ICD-10-CM

## 2021-03-16 DIAGNOSIS — F17.210: ICD-10-CM

## 2021-03-16 DIAGNOSIS — I10: ICD-10-CM

## 2021-03-16 DIAGNOSIS — E66.01: ICD-10-CM

## 2021-03-16 DIAGNOSIS — I50.9: ICD-10-CM

## 2021-03-16 DIAGNOSIS — E78.5: ICD-10-CM

## 2021-03-16 DIAGNOSIS — Z88.8: ICD-10-CM

## 2021-03-16 DIAGNOSIS — Z95.0: ICD-10-CM

## 2021-03-16 DIAGNOSIS — E11.65: ICD-10-CM

## 2021-03-16 LAB
ALBUMIN LEVEL: 4 G/DL (ref 3.5–5)
ALP ISO SERPL-ACNC: 124 U/L (ref 38–126)
ALT SERPLBLD-CCNC: 13 U/L (ref 12–78)
ANION GAP SERPL CALC-SCNC: 11 MEQ/L (ref 5–15)
ANISOCYTOSIS BLD QL: (no result)
AST SERPL QL: 23 U/L (ref 14–36)
BILIRUB DIRECT SERPL-MCNC: 0.3 MG/DL (ref 0–0.4)
BILIRUB INDIRECT SERPL-MCNC: 0.3 MG/DL (ref 0–1.1)
BILIRUB INDIRECT SERPL-MCNC: 0.4 MG/DL (ref 0–0.9)
BILIRUBIN,TOTAL: 0.7 MG/DL (ref 0.2–1.3)
BUN SERPL-MCNC: 25 MG/DL (ref 7–17)
CALCIUM SPEC-MCNC: 9.2 MG/DL (ref 8.4–10.2)
CELLS COUNTED: 100
CHLORIDE SPEC-SCNC: 100 MMOL/L (ref 98–107)
CO2 BLDCOA-SCNC: 31.8 MMHG (ref 23–27)
CO2 SERPL-SCNC: 32 MMOL/L (ref 22–30)
COLOR UR: YELLOW
CREAT BLD-SCNC: 1.4 MG/DL (ref 0.52–1.04)
CREATININE CLEARANCE ESTIMATED: 42 ML/MIN (ref 50–200)
CRP SERPL HS-MCNC: 62.8 MG/L (ref 0–4)
ESTIMATED GLOMERULAR FILT RATE: 38 ML/MIN (ref 60–?)
GFR (AFRICAN AMERICAN): 47 ML/MIN (ref 60–?)
GLUCOSE: 172 MG/DL (ref 74–100)
HCO3 BLDA-SCNC: 29.8 MMHG (ref 22–26)
HCT VFR BLD CALC: 39 % (ref 37–47)
HGB BLD-MCNC: 11.8 G/DL (ref 12.2–16.2)
MANUAL DIFFERENTIAL: (no result)
MCHC RBC-ENTMCNC: 30.2 G/DL (ref 31.8–35.4)
MCV RBC: 86 FL (ref 81–99)
MEAN CORPUSCULAR HEMOGLOBIN: 26 PG (ref 27–31.2)
MICRO URNS: (no result)
MUCOUS THREADS URNS QL MICRO: (no result) /LPF
NT PRO BRAIN NATRIURETIC PEP.: 2610 PG/ML (ref 0–125)
PCO2 BLDA: 66.2 MMHG (ref 35–45)
PH BLDA: 7.27 MMOL/L (ref 7.35–7.45)
PH UR: 5.5 [PH] (ref 5–8.5)
PLATELET # BLD: 270 K/MM3 (ref 142–424)
PO2 BLDA: 76.7 MMHG (ref 80–100)
POTASSIUM: 4 MMOL/L (ref 3.5–5.1)
PROCALCITONIN: 0.1 NG/ML (ref 0–2)
PROT SERPL-MCNC: 8.8 G/DL (ref 6.3–8.2)
RBC # BLD AUTO: 4.54 M/MM3 (ref 4.2–5.4)
RBC #/AREA URNS HPF: (no result) #/HPF (ref 0–3)
SODIUM SPEC-SCNC: 139 MMOL/L (ref 136–145)
SP GR UR: 1.02 (ref 1–1.03)
STOMATOCYTES BLD QL SMEAR: (no result)
T4 (THYROXINE): 9.3 UG/DL (ref 5.53–11)
TROPONIN I: < 0.01 NG/ML (ref 0–0.03)
TSH SERPL-ACNC: 4.22 UIU/ML (ref 0.47–4.68)
UROBILINOGEN UR QL: 0.2 EU/DL
WBC # BLD AUTO: 13.7 K/MM3 (ref 4.8–10.8)
WBC # UR: (no result) #/HPF (ref 0–3)

## 2021-03-16 PROCEDURE — 84145 PROCALCITONIN (PCT): CPT

## 2021-03-16 PROCEDURE — 99284 EMERGENCY DEPT VISIT MOD MDM: CPT

## 2021-03-16 PROCEDURE — 96375 TX/PRO/DX INJ NEW DRUG ADDON: CPT

## 2021-03-16 PROCEDURE — 85007 BL SMEAR W/DIFF WBC COUNT: CPT

## 2021-03-16 PROCEDURE — 84443 ASSAY THYROID STIM HORMONE: CPT

## 2021-03-16 PROCEDURE — 85651 RBC SED RATE NONAUTOMATED: CPT

## 2021-03-16 PROCEDURE — 81001 URINALYSIS AUTO W/SCOPE: CPT

## 2021-03-16 PROCEDURE — 80076 HEPATIC FUNCTION PANEL: CPT

## 2021-03-16 PROCEDURE — 83880 ASSAY OF NATRIURETIC PEPTIDE: CPT

## 2021-03-16 PROCEDURE — 80048 BASIC METABOLIC PNL TOTAL CA: CPT

## 2021-03-16 PROCEDURE — 87040 BLOOD CULTURE FOR BACTERIA: CPT

## 2021-03-16 PROCEDURE — 84436 ASSAY OF TOTAL THYROXINE: CPT

## 2021-03-16 PROCEDURE — 84484 ASSAY OF TROPONIN QUANT: CPT

## 2021-03-16 PROCEDURE — 82803 BLOOD GASES ANY COMBINATION: CPT

## 2021-03-16 PROCEDURE — 36415 COLL VENOUS BLD VENIPUNCTURE: CPT

## 2021-03-16 PROCEDURE — 93005 ELECTROCARDIOGRAM TRACING: CPT

## 2021-03-16 PROCEDURE — 85025 COMPLETE CBC W/AUTO DIFF WBC: CPT

## 2021-03-16 PROCEDURE — 83605 ASSAY OF LACTIC ACID: CPT

## 2021-03-16 PROCEDURE — 71045 X-RAY EXAM CHEST 1 VIEW: CPT

## 2021-03-16 PROCEDURE — 87633 RESP VIRUS 12-25 TARGETS: CPT

## 2021-03-16 PROCEDURE — 96374 THER/PROPH/DIAG INJ IV PUSH: CPT

## 2021-03-16 PROCEDURE — 87581 M.PNEUMON DNA AMP PROBE: CPT

## 2021-03-16 PROCEDURE — 87798 DETECT AGENT NOS DNA AMP: CPT

## 2021-03-16 PROCEDURE — 93306 TTE W/DOPPLER COMPLETE: CPT

## 2021-03-16 PROCEDURE — 86140 C-REACTIVE PROTEIN: CPT

## 2021-03-16 NOTE — PC.NURSE
aware that pt is feeling better and wants to go home. Pt states that she wants to fu with her cardiologist and family doctor. She is afraid that her medicine will change and she doesnt want that. Instructed pt that she needs to fu with her

## 2021-03-16 NOTE — PC.NURSE
Pt states that she wants to go home. Discussed with pt multi times about care needed at the hospital and the need to stay. Pt states that she feels better and wants to go home. Daughter at bedside, MD paged to make aware of pt request to go home

## 2021-04-10 ENCOUNTER — HOSPITAL ENCOUNTER (INPATIENT)
Dept: HOSPITAL 22 - ER | Age: 61
LOS: 3 days | Discharge: HOME | DRG: 309 | End: 2021-04-13
Payer: MEDICARE

## 2021-04-10 VITALS
HEART RATE: 138 BPM | OXYGEN SATURATION: 90 % | SYSTOLIC BLOOD PRESSURE: 105 MMHG | DIASTOLIC BLOOD PRESSURE: 67 MMHG | RESPIRATION RATE: 16 BRPM

## 2021-04-10 VITALS
SYSTOLIC BLOOD PRESSURE: 108 MMHG | DIASTOLIC BLOOD PRESSURE: 70 MMHG | HEART RATE: 125 BPM | OXYGEN SATURATION: 91 % | RESPIRATION RATE: 20 BRPM

## 2021-04-10 VITALS
SYSTOLIC BLOOD PRESSURE: 101 MMHG | DIASTOLIC BLOOD PRESSURE: 59 MMHG | OXYGEN SATURATION: 96 % | HEART RATE: 137 BPM | RESPIRATION RATE: 19 BRPM

## 2021-04-10 VITALS
OXYGEN SATURATION: 93 % | SYSTOLIC BLOOD PRESSURE: 117 MMHG | RESPIRATION RATE: 22 BRPM | HEART RATE: 141 BPM | DIASTOLIC BLOOD PRESSURE: 75 MMHG

## 2021-04-10 VITALS
RESPIRATION RATE: 17 BRPM | HEART RATE: 114 BPM | DIASTOLIC BLOOD PRESSURE: 54 MMHG | SYSTOLIC BLOOD PRESSURE: 105 MMHG | OXYGEN SATURATION: 93 %

## 2021-04-10 VITALS
DIASTOLIC BLOOD PRESSURE: 68 MMHG | RESPIRATION RATE: 23 BRPM | HEART RATE: 138 BPM | SYSTOLIC BLOOD PRESSURE: 103 MMHG | OXYGEN SATURATION: 94 %

## 2021-04-10 VITALS — SYSTOLIC BLOOD PRESSURE: 102 MMHG | DIASTOLIC BLOOD PRESSURE: 70 MMHG

## 2021-04-10 VITALS — SYSTOLIC BLOOD PRESSURE: 116 MMHG | DIASTOLIC BLOOD PRESSURE: 66 MMHG | HEART RATE: 135 BPM | OXYGEN SATURATION: 91 %

## 2021-04-10 VITALS
OXYGEN SATURATION: 93 % | HEART RATE: 132 BPM | RESPIRATION RATE: 22 BRPM | DIASTOLIC BLOOD PRESSURE: 61 MMHG | SYSTOLIC BLOOD PRESSURE: 87 MMHG

## 2021-04-10 VITALS
HEART RATE: 65 BPM | SYSTOLIC BLOOD PRESSURE: 120 MMHG | DIASTOLIC BLOOD PRESSURE: 66 MMHG | RESPIRATION RATE: 19 BRPM | TEMPERATURE: 98.06 F | OXYGEN SATURATION: 90 %

## 2021-04-10 VITALS
HEART RATE: 134 BPM | DIASTOLIC BLOOD PRESSURE: 80 MMHG | OXYGEN SATURATION: 89 % | RESPIRATION RATE: 21 BRPM | SYSTOLIC BLOOD PRESSURE: 115 MMHG

## 2021-04-10 VITALS
HEART RATE: 132 BPM | DIASTOLIC BLOOD PRESSURE: 52 MMHG | SYSTOLIC BLOOD PRESSURE: 83 MMHG | RESPIRATION RATE: 17 BRPM | OXYGEN SATURATION: 91 %

## 2021-04-10 VITALS
RESPIRATION RATE: 20 BRPM | OXYGEN SATURATION: 94 % | SYSTOLIC BLOOD PRESSURE: 126 MMHG | HEART RATE: 141 BPM | TEMPERATURE: 98.6 F | DIASTOLIC BLOOD PRESSURE: 78 MMHG

## 2021-04-10 VITALS
HEART RATE: 54 BPM | TEMPERATURE: 98.06 F | SYSTOLIC BLOOD PRESSURE: 111 MMHG | RESPIRATION RATE: 17 BRPM | DIASTOLIC BLOOD PRESSURE: 58 MMHG | OXYGEN SATURATION: 92 %

## 2021-04-10 VITALS
RESPIRATION RATE: 15 BRPM | SYSTOLIC BLOOD PRESSURE: 108 MMHG | HEART RATE: 49 BPM | DIASTOLIC BLOOD PRESSURE: 60 MMHG | OXYGEN SATURATION: 91 %

## 2021-04-10 VITALS
RESPIRATION RATE: 16 BRPM | DIASTOLIC BLOOD PRESSURE: 58 MMHG | OXYGEN SATURATION: 91 % | SYSTOLIC BLOOD PRESSURE: 94 MMHG | HEART RATE: 132 BPM

## 2021-04-10 VITALS
OXYGEN SATURATION: 92 % | DIASTOLIC BLOOD PRESSURE: 81 MMHG | RESPIRATION RATE: 16 BRPM | HEART RATE: 54 BPM | SYSTOLIC BLOOD PRESSURE: 134 MMHG

## 2021-04-10 VITALS — DIASTOLIC BLOOD PRESSURE: 81 MMHG | HEART RATE: 59 BPM | SYSTOLIC BLOOD PRESSURE: 121 MMHG | OXYGEN SATURATION: 90 %

## 2021-04-10 VITALS
HEART RATE: 49 BPM | DIASTOLIC BLOOD PRESSURE: 52 MMHG | RESPIRATION RATE: 16 BRPM | SYSTOLIC BLOOD PRESSURE: 103 MMHG | OXYGEN SATURATION: 97 %

## 2021-04-10 VITALS
HEART RATE: 134 BPM | SYSTOLIC BLOOD PRESSURE: 102 MMHG | DIASTOLIC BLOOD PRESSURE: 63 MMHG | RESPIRATION RATE: 21 BRPM | OXYGEN SATURATION: 94 %

## 2021-04-10 VITALS — DIASTOLIC BLOOD PRESSURE: 44 MMHG | HEART RATE: 105 BPM | SYSTOLIC BLOOD PRESSURE: 98 MMHG

## 2021-04-10 VITALS
OXYGEN SATURATION: 92 % | DIASTOLIC BLOOD PRESSURE: 84 MMHG | HEART RATE: 136 BPM | SYSTOLIC BLOOD PRESSURE: 130 MMHG | RESPIRATION RATE: 23 BRPM

## 2021-04-10 VITALS
BODY MASS INDEX: 57.3 KG/M2 | BODY MASS INDEX: 54.8 KG/M2 | BODY MASS INDEX: 55.6 KG/M2 | BODY MASS INDEX: 56.7 KG/M2 | BODY MASS INDEX: 57.2 KG/M2

## 2021-04-10 VITALS — SYSTOLIC BLOOD PRESSURE: 114 MMHG | DIASTOLIC BLOOD PRESSURE: 73 MMHG

## 2021-04-10 VITALS
HEART RATE: 140 BPM | OXYGEN SATURATION: 93 % | DIASTOLIC BLOOD PRESSURE: 67 MMHG | SYSTOLIC BLOOD PRESSURE: 109 MMHG | RESPIRATION RATE: 19 BRPM

## 2021-04-10 VITALS — SYSTOLIC BLOOD PRESSURE: 119 MMHG | DIASTOLIC BLOOD PRESSURE: 78 MMHG | HEART RATE: 133 BPM

## 2021-04-10 VITALS
TEMPERATURE: 98 F | HEART RATE: 66 BPM | DIASTOLIC BLOOD PRESSURE: 67 MMHG | RESPIRATION RATE: 20 BRPM | OXYGEN SATURATION: 91 % | SYSTOLIC BLOOD PRESSURE: 100 MMHG

## 2021-04-10 VITALS — RESPIRATION RATE: 24 BRPM | HEART RATE: 120 BPM

## 2021-04-10 VITALS
DIASTOLIC BLOOD PRESSURE: 57 MMHG | OXYGEN SATURATION: 95 % | HEART RATE: 137 BPM | RESPIRATION RATE: 18 BRPM | SYSTOLIC BLOOD PRESSURE: 109 MMHG

## 2021-04-10 VITALS
SYSTOLIC BLOOD PRESSURE: 140 MMHG | OXYGEN SATURATION: 92 % | TEMPERATURE: 97.52 F | RESPIRATION RATE: 17 BRPM | HEART RATE: 50 BPM | DIASTOLIC BLOOD PRESSURE: 59 MMHG

## 2021-04-10 VITALS — HEART RATE: 60 BPM

## 2021-04-10 VITALS — SYSTOLIC BLOOD PRESSURE: 89 MMHG | HEART RATE: 109 BPM | DIASTOLIC BLOOD PRESSURE: 55 MMHG | RESPIRATION RATE: 18 BRPM

## 2021-04-10 VITALS
SYSTOLIC BLOOD PRESSURE: 104 MMHG | RESPIRATION RATE: 16 BRPM | DIASTOLIC BLOOD PRESSURE: 61 MMHG | OXYGEN SATURATION: 91 % | HEART RATE: 133 BPM

## 2021-04-10 VITALS
SYSTOLIC BLOOD PRESSURE: 122 MMHG | RESPIRATION RATE: 21 BRPM | DIASTOLIC BLOOD PRESSURE: 72 MMHG | HEART RATE: 140 BPM | OXYGEN SATURATION: 93 %

## 2021-04-10 VITALS — DIASTOLIC BLOOD PRESSURE: 54 MMHG | SYSTOLIC BLOOD PRESSURE: 79 MMHG

## 2021-04-10 VITALS
RESPIRATION RATE: 16 BRPM | HEART RATE: 59 BPM | OXYGEN SATURATION: 92 % | SYSTOLIC BLOOD PRESSURE: 100 MMHG | DIASTOLIC BLOOD PRESSURE: 53 MMHG

## 2021-04-10 DIAGNOSIS — I48.92: Primary | ICD-10-CM

## 2021-04-10 DIAGNOSIS — E78.5: ICD-10-CM

## 2021-04-10 DIAGNOSIS — T82.110A: ICD-10-CM

## 2021-04-10 DIAGNOSIS — Z79.4: ICD-10-CM
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
## 2021-04-10 DIAGNOSIS — G47.33: ICD-10-CM

## 2021-04-10 DIAGNOSIS — I11.0: ICD-10-CM

## 2021-04-10 DIAGNOSIS — Z99.81: ICD-10-CM

## 2021-04-10 DIAGNOSIS — K21.9: ICD-10-CM

## 2021-04-10 DIAGNOSIS — Z79.02: ICD-10-CM

## 2021-04-10 DIAGNOSIS — I27.20: ICD-10-CM

## 2021-04-10 DIAGNOSIS — F17.210: ICD-10-CM

## 2021-04-10 DIAGNOSIS — J44.9: ICD-10-CM

## 2021-04-10 DIAGNOSIS — I48.91: ICD-10-CM

## 2021-04-10 DIAGNOSIS — Z88.8: ICD-10-CM

## 2021-04-10 DIAGNOSIS — I50.9: ICD-10-CM

## 2021-04-10 DIAGNOSIS — J96.12: ICD-10-CM

## 2021-04-10 DIAGNOSIS — Z71.6: ICD-10-CM

## 2021-04-10 DIAGNOSIS — E66.01: ICD-10-CM

## 2021-04-10 DIAGNOSIS — R00.1: ICD-10-CM

## 2021-04-10 DIAGNOSIS — Z88.1: ICD-10-CM

## 2021-04-10 LAB
ALBUMIN LEVEL: 3.9 G/DL (ref 3.5–5)
ALBUMIN/GLOB SERPL: 0.9 {RATIO} (ref 1.1–1.8)
ALP ISO SERPL-ACNC: 115 U/L (ref 38–126)
ALT SERPLBLD-CCNC: 13 U/L (ref 12–78)
ANION GAP SERPL CALC-SCNC: 11.1 MEQ/L (ref 5–15)
AST SERPL QL: 23 U/L (ref 14–36)
BILIRUBIN,TOTAL: 0.7 MG/DL (ref 0.2–1.3)
BUN SERPL-MCNC: 15 MG/DL (ref 7–17)
CALCIUM SPEC-MCNC: 9.1 MG/DL (ref 8.4–10.2)
CHLORIDE SPEC-SCNC: 96 MMOL/L (ref 98–107)
CO2 SERPL-SCNC: 36 MMOL/L (ref 22–30)
CREAT BLD-SCNC: 1.3 MG/DL (ref 0.52–1.04)
CREATININE CLEARANCE ESTIMATED: 37 ML/MIN (ref 50–200)
ESTIMATED GLOMERULAR FILT RATE: 42 ML/MIN (ref 60–?)
GFR (AFRICAN AMERICAN): 51 ML/MIN (ref 60–?)
GLOBULIN SER CALC-MCNC: 4.2 G/DL (ref 1.3–3.2)
GLUCOSE BLDC GLUCOMTR-MCNC: 112 MG/DL (ref 70–110)
GLUCOSE BLDC GLUCOMTR-MCNC: 129 MG/DL (ref 70–110)
GLUCOSE BLDC GLUCOMTR-MCNC: 150 MG/DL (ref 70–110)
GLUCOSE: 130 MG/DL (ref 74–100)
HCT VFR BLD CALC: 36.3 % (ref 37–47)
HGB BLD-MCNC: 10.9 G/DL (ref 12.2–16.2)
MAGNESIUM: 2 MG/DL (ref 1.6–2.3)
MCHC RBC-ENTMCNC: 30.2 G/DL (ref 31.8–35.4)
MCV RBC: 83.9 FL (ref 81–99)
MEAN CORPUSCULAR HEMOGLOBIN: 25.3 PG (ref 27–31.2)
NT PRO BRAIN NATRIURETIC PEP.: 3120 PG/ML (ref 0–125)
PLATELET # BLD: 325 K/MM3 (ref 142–424)
POTASSIUM: 4.1 MMOL/L (ref 3.5–5.1)
PROT SERPL-MCNC: 8.1 G/DL (ref 6.3–8.2)
RBC # BLD AUTO: 4.32 M/MM3 (ref 4.2–5.4)
SODIUM SPEC-SCNC: 139 MMOL/L (ref 136–145)
TROPONIN I: < 0.01 NG/ML (ref 0–0.03)
TROPONIN I: < 0.01 NG/ML (ref 0–0.03)
TSH SERPL-ACNC: 2.25 UIU/ML (ref 0.47–4.68)
WBC # BLD AUTO: 11.1 K/MM3 (ref 4.8–10.8)

## 2021-04-10 PROCEDURE — 83880 ASSAY OF NATRIURETIC PEPTIDE: CPT

## 2021-04-10 PROCEDURE — 94761 N-INVAS EAR/PLS OXIMETRY MLT: CPT

## 2021-04-10 PROCEDURE — 82962 GLUCOSE BLOOD TEST: CPT

## 2021-04-10 PROCEDURE — 80053 COMPREHEN METABOLIC PANEL: CPT

## 2021-04-10 PROCEDURE — 80048 BASIC METABOLIC PNL TOTAL CA: CPT

## 2021-04-10 PROCEDURE — 84443 ASSAY THYROID STIM HORMONE: CPT

## 2021-04-10 PROCEDURE — 71275 CT ANGIOGRAPHY CHEST: CPT

## 2021-04-10 PROCEDURE — 96365 THER/PROPH/DIAG IV INF INIT: CPT

## 2021-04-10 PROCEDURE — 85025 COMPLETE CBC W/AUTO DIFF WBC: CPT

## 2021-04-10 PROCEDURE — 71045 X-RAY EXAM CHEST 1 VIEW: CPT

## 2021-04-10 PROCEDURE — 87581 M.PNEUMON DNA AMP PROBE: CPT

## 2021-04-10 PROCEDURE — 84484 ASSAY OF TROPONIN QUANT: CPT

## 2021-04-10 PROCEDURE — 93005 ELECTROCARDIOGRAM TRACING: CPT

## 2021-04-10 PROCEDURE — 94760 N-INVAS EAR/PLS OXIMETRY 1: CPT

## 2021-04-10 PROCEDURE — 96375 TX/PRO/DX INJ NEW DRUG ADDON: CPT

## 2021-04-10 PROCEDURE — 87633 RESP VIRUS 12-25 TARGETS: CPT

## 2021-04-10 PROCEDURE — 83735 ASSAY OF MAGNESIUM: CPT

## 2021-04-10 PROCEDURE — 99284 EMERGENCY DEPT VISIT MOD MDM: CPT

## 2021-04-10 PROCEDURE — 87798 DETECT AGENT NOS DNA AMP: CPT

## 2021-04-10 PROCEDURE — 96367 TX/PROPH/DG ADDL SEQ IV INF: CPT

## 2021-04-10 PROCEDURE — 93308 TTE F-UP OR LMTD: CPT

## 2021-04-10 NOTE — PC.NURSE
Pt to CT at this time via stretcher accompanied by JAG Parks w/ cont. monitor in place and amio gtt at 1mg/min

## 2021-04-10 NOTE — PC.NURSE
DID ROUNDING WITH THE TECHS,EMPYTIED TRASH,AND LINENS,PASSED SNACKS. PATIENT HAD NO NEEDS AT THIS TIME.MAHESHM

## 2021-04-10 NOTE — PC.NURSE
Bradycardia noted.  Diltiazem gtt on HOLD.  Amio gtt decreased to 0.5mg/hr x 18hrs.  Amio gtt to be turned OFF @ 0700 on Crow 4/10.

## 2021-04-11 VITALS — DIASTOLIC BLOOD PRESSURE: 58 MMHG | SYSTOLIC BLOOD PRESSURE: 97 MMHG | HEART RATE: 58 BPM | OXYGEN SATURATION: 98 %

## 2021-04-11 VITALS
OXYGEN SATURATION: 90 % | DIASTOLIC BLOOD PRESSURE: 68 MMHG | HEART RATE: 60 BPM | RESPIRATION RATE: 18 BRPM | SYSTOLIC BLOOD PRESSURE: 142 MMHG

## 2021-04-11 VITALS
OXYGEN SATURATION: 92 % | HEART RATE: 52 BPM | DIASTOLIC BLOOD PRESSURE: 64 MMHG | RESPIRATION RATE: 29 BRPM | SYSTOLIC BLOOD PRESSURE: 119 MMHG | TEMPERATURE: 98 F

## 2021-04-11 VITALS
SYSTOLIC BLOOD PRESSURE: 99 MMHG | DIASTOLIC BLOOD PRESSURE: 48 MMHG | OXYGEN SATURATION: 90 % | HEART RATE: 52 BPM | RESPIRATION RATE: 12 BRPM

## 2021-04-11 VITALS
RESPIRATION RATE: 16 BRPM | TEMPERATURE: 98.06 F | SYSTOLIC BLOOD PRESSURE: 92 MMHG | HEART RATE: 50 BPM | OXYGEN SATURATION: 93 % | DIASTOLIC BLOOD PRESSURE: 47 MMHG

## 2021-04-11 VITALS — SYSTOLIC BLOOD PRESSURE: 129 MMHG | DIASTOLIC BLOOD PRESSURE: 61 MMHG | HEART RATE: 45 BPM | OXYGEN SATURATION: 90 %

## 2021-04-11 VITALS — DIASTOLIC BLOOD PRESSURE: 60 MMHG | SYSTOLIC BLOOD PRESSURE: 101 MMHG | OXYGEN SATURATION: 89 % | HEART RATE: 47 BPM

## 2021-04-11 VITALS — OXYGEN SATURATION: 90 % | HEART RATE: 45 BPM

## 2021-04-11 VITALS
DIASTOLIC BLOOD PRESSURE: 66 MMHG | HEART RATE: 61 BPM | RESPIRATION RATE: 29 BRPM | SYSTOLIC BLOOD PRESSURE: 114 MMHG | OXYGEN SATURATION: 92 %

## 2021-04-11 VITALS — SYSTOLIC BLOOD PRESSURE: 104 MMHG | DIASTOLIC BLOOD PRESSURE: 60 MMHG | OXYGEN SATURATION: 90 % | HEART RATE: 46 BPM

## 2021-04-11 VITALS
DIASTOLIC BLOOD PRESSURE: 53 MMHG | RESPIRATION RATE: 19 BRPM | HEART RATE: 48 BPM | OXYGEN SATURATION: 98 % | SYSTOLIC BLOOD PRESSURE: 108 MMHG | TEMPERATURE: 97.88 F

## 2021-04-11 VITALS
RESPIRATION RATE: 17 BRPM | HEART RATE: 50 BPM | SYSTOLIC BLOOD PRESSURE: 103 MMHG | DIASTOLIC BLOOD PRESSURE: 61 MMHG | OXYGEN SATURATION: 92 %

## 2021-04-11 VITALS — TEMPERATURE: 97.3 F

## 2021-04-11 LAB — GLUCOSE BLDC GLUCOMTR-MCNC: 147 MG/DL (ref 70–110)

## 2021-04-11 NOTE — PC.NURSE
She has slept with her trilogy on all night.  Paced on telemetry.  Pulse in the 40s-50s t/o the night.  Family is at the bedside.  Plan is to turn amiodarone off at 0700.

## 2021-04-11 NOTE — PC.NURSE
pacemaker not firing correctly.  Dr. Vincent has updated Dr. Dyson.  Pt wishes for [facility] MDs to continue with her care to resolve the issue.

## 2021-04-12 VITALS
TEMPERATURE: 97.16 F | DIASTOLIC BLOOD PRESSURE: 59 MMHG | OXYGEN SATURATION: 86 % | HEART RATE: 57 BPM | SYSTOLIC BLOOD PRESSURE: 103 MMHG | RESPIRATION RATE: 16 BRPM

## 2021-04-12 VITALS
SYSTOLIC BLOOD PRESSURE: 93 MMHG | DIASTOLIC BLOOD PRESSURE: 43 MMHG | OXYGEN SATURATION: 93 % | HEART RATE: 44 BPM | RESPIRATION RATE: 17 BRPM | TEMPERATURE: 98.06 F

## 2021-04-12 VITALS
OXYGEN SATURATION: 89 % | HEART RATE: 60 BPM | SYSTOLIC BLOOD PRESSURE: 100 MMHG | DIASTOLIC BLOOD PRESSURE: 54 MMHG | RESPIRATION RATE: 16 BRPM | TEMPERATURE: 97.52 F

## 2021-04-12 VITALS
OXYGEN SATURATION: 92 % | HEART RATE: 57 BPM | SYSTOLIC BLOOD PRESSURE: 106 MMHG | TEMPERATURE: 97.88 F | RESPIRATION RATE: 18 BRPM | DIASTOLIC BLOOD PRESSURE: 55 MMHG

## 2021-04-12 VITALS
SYSTOLIC BLOOD PRESSURE: 102 MMHG | OXYGEN SATURATION: 91 % | TEMPERATURE: 97.16 F | HEART RATE: 54 BPM | DIASTOLIC BLOOD PRESSURE: 52 MMHG | RESPIRATION RATE: 18 BRPM

## 2021-04-12 VITALS
OXYGEN SATURATION: 87 % | RESPIRATION RATE: 30 BRPM | TEMPERATURE: 98.4 F | SYSTOLIC BLOOD PRESSURE: 121 MMHG | HEART RATE: 57 BPM | DIASTOLIC BLOOD PRESSURE: 62 MMHG

## 2021-04-12 VITALS — SYSTOLIC BLOOD PRESSURE: 108 MMHG | DIASTOLIC BLOOD PRESSURE: 90 MMHG

## 2021-04-12 VITALS — OXYGEN SATURATION: 90 % | HEART RATE: 56 BPM

## 2021-04-12 VITALS — HEART RATE: 63 BPM

## 2021-04-12 LAB
GLUCOSE BLDC GLUCOMTR-MCNC: 110 MG/DL (ref 70–110)
GLUCOSE BLDC GLUCOMTR-MCNC: 125 MG/DL (ref 70–110)
GLUCOSE BLDC GLUCOMTR-MCNC: 151 MG/DL (ref 70–110)

## 2021-04-12 NOTE — PC.NURSE
Pt. has not c/o pain, n/v/d, or dizziness. Pt. denies soa; wears [employer] hs. o2 sat 95-99% while awake and 89-93% while asleep. Bradycardia noted per cardiac monitor.

## 2021-04-13 VITALS
TEMPERATURE: 97.88 F | SYSTOLIC BLOOD PRESSURE: 113 MMHG | HEART RATE: 68 BPM | RESPIRATION RATE: 18 BRPM | DIASTOLIC BLOOD PRESSURE: 59 MMHG | OXYGEN SATURATION: 98 %

## 2021-04-13 VITALS
RESPIRATION RATE: 18 BRPM | DIASTOLIC BLOOD PRESSURE: 53 MMHG | SYSTOLIC BLOOD PRESSURE: 123 MMHG | OXYGEN SATURATION: 94 % | TEMPERATURE: 97.34 F | HEART RATE: 80 BPM

## 2021-04-13 VITALS
OXYGEN SATURATION: 94 % | SYSTOLIC BLOOD PRESSURE: 116 MMHG | HEART RATE: 70 BPM | RESPIRATION RATE: 24 BRPM | DIASTOLIC BLOOD PRESSURE: 58 MMHG | TEMPERATURE: 97.6 F

## 2021-04-13 VITALS
OXYGEN SATURATION: 95 % | SYSTOLIC BLOOD PRESSURE: 105 MMHG | HEART RATE: 70 BPM | DIASTOLIC BLOOD PRESSURE: 60 MMHG | RESPIRATION RATE: 16 BRPM | TEMPERATURE: 98.24 F

## 2021-04-13 VITALS
RESPIRATION RATE: 20 BRPM | TEMPERATURE: 97.7 F | SYSTOLIC BLOOD PRESSURE: 115 MMHG | DIASTOLIC BLOOD PRESSURE: 43 MMHG | OXYGEN SATURATION: 94 % | HEART RATE: 60 BPM

## 2021-04-13 VITALS — HEART RATE: 70 BPM

## 2021-04-13 LAB
ANION GAP SERPL CALC-SCNC: 9.7 MEQ/L (ref 5–15)
BUN SERPL-MCNC: 15 MG/DL (ref 7–17)
CALCIUM SPEC-MCNC: 8.8 MG/DL (ref 8.4–10.2)
CHLORIDE SPEC-SCNC: 96 MMOL/L (ref 98–107)
CO2 SERPL-SCNC: 35 MMOL/L (ref 22–30)
CREAT BLD-SCNC: 1.1 MG/DL (ref 0.52–1.04)
CREATININE CLEARANCE ESTIMATED: 41 ML/MIN (ref 50–200)
ESTIMATED GLOMERULAR FILT RATE: 51 ML/MIN (ref 60–?)
GFR (AFRICAN AMERICAN): 61 ML/MIN (ref 60–?)
GLUCOSE: 111 MG/DL (ref 74–100)
HCT VFR BLD CALC: 34.6 % (ref 37–47)
HGB BLD-MCNC: 10.5 G/DL (ref 12.2–16.2)
MCHC RBC-ENTMCNC: 30.2 G/DL (ref 31.8–35.4)
MCV RBC: 83.9 FL (ref 81–99)
MEAN CORPUSCULAR HEMOGLOBIN: 25.4 PG (ref 27–31.2)
PLATELET # BLD: 267 K/MM3 (ref 142–424)
POTASSIUM: 3.7 MMOL/L (ref 3.5–5.1)
RBC # BLD AUTO: 4.13 M/MM3 (ref 4.2–5.4)
SODIUM SPEC-SCNC: 137 MMOL/L (ref 136–145)
WBC # BLD AUTO: 7.9 K/MM3 (ref 4.8–10.8)

## 2021-04-13 NOTE — PC.NURSE
pt was returned to her room at approx 1410 r/t procedure being aborted due to concerns for respiratory status. family and patient aware.

## 2021-04-13 NOTE — PC.NURSE
called report to Bj at [facility] IC at 1800. pt is to be transferred into the care of Dr James. pt cardiologist is Dr Liriano. [facility] was notified of transport at 1821.

## 2021-04-13 NOTE — PC.NURSE
Pt. has not c/o n/v/d, dizziness or pain. Some soa noted with o2 sat occasionally decreasing, paged RT to manage trilogy, since then sats have maintained 88-91% while asleep. No further complaints.

## 2021-04-13 NOTE — PC.NURSE
pt has appeared to rest well this shift. attempted to have pacemaker leads replaced this shift, procedure aborted r/t concern for respiratory failure/distress. pt is currently lying in bed with trilogy device in place. nad noted. lungs are clear but

## 2021-04-14 LAB — GLUCOSE BLDC GLUCOMTR-MCNC: 103 MG/DL (ref 70–110)

## 2022-04-23 NOTE — TELEPHONE ENCOUNTER
General Patient called for Torsemide 20 mg BID refill, patients note states that she was only supposed to take torsemide 20 mg BID for a week then back it down to 10 mg BID (her previous dose) patient states that she has been taking 20mg BID for about a month now and didn't back it down because it was helping her. DO you want patient to take 20 mg BID or 10 mg BID? Thank you
